# Patient Record
Sex: MALE | Race: WHITE | Employment: OTHER | URBAN - METROPOLITAN AREA
[De-identification: names, ages, dates, MRNs, and addresses within clinical notes are randomized per-mention and may not be internally consistent; named-entity substitution may affect disease eponyms.]

---

## 2020-08-21 ENCOUNTER — HOSPITAL ENCOUNTER (EMERGENCY)
Age: 85
Discharge: HOME | End: 2020-08-21
Attending: EMERGENCY MEDICINE
Payer: MEDICARE

## 2020-08-21 ENCOUNTER — APPOINTMENT (OUTPATIENT)
Dept: GENERAL RADIOLOGY | Age: 85
End: 2020-08-21
Payer: MEDICARE

## 2020-08-21 ENCOUNTER — APPOINTMENT (OUTPATIENT)
Dept: CT IMAGING | Age: 85
End: 2020-08-21
Payer: MEDICARE

## 2020-08-21 VITALS
BODY MASS INDEX: 30.05 KG/M2 | TEMPERATURE: 98.1 F | SYSTOLIC BLOOD PRESSURE: 134 MMHG | OXYGEN SATURATION: 98 % | WEIGHT: 187 LBS | HEART RATE: 78 BPM | HEIGHT: 66 IN | RESPIRATION RATE: 18 BRPM | DIASTOLIC BLOOD PRESSURE: 63 MMHG

## 2020-08-21 PROCEDURE — 73700 CT LOWER EXTREMITY W/O DYE: CPT

## 2020-08-21 PROCEDURE — 99282 EMERGENCY DEPT VISIT SF MDM: CPT

## 2020-08-21 PROCEDURE — 99283 EMERGENCY DEPT VISIT LOW MDM: CPT

## 2020-08-21 PROCEDURE — 73130 X-RAY EXAM OF HAND: CPT

## 2020-08-21 PROCEDURE — 73564 X-RAY EXAM KNEE 4 OR MORE: CPT

## 2020-08-21 RX ORDER — HYDROCODONE BITARTRATE AND ACETAMINOPHEN 5; 325 MG/1; MG/1
1 TABLET ORAL EVERY 6 HOURS PRN
Qty: 12 TABLET | Refills: 0 | Status: SHIPPED | OUTPATIENT
Start: 2020-08-21 | End: 2020-08-24

## 2020-08-21 NOTE — ED PROVIDER NOTES
ED Attending  CC: Nataly      Department of Emergency Medicine   ED  Provider Note  Admit Date/RoomTime: 8/21/2020 11:23 AM  ED Room: 29/29  Chief Complaint   Fall (Glean Cea fall with right knee injury- no head injury or loc) and Knee Injury    History of Present Illness   Source of history provided by:  patient. History/Exam Limitations: none. Vernell Howell is a 80 y.o. old male who has a past medical history of:   Past Medical History:   Diagnosis Date    Cancer 1996    PROSTATE    Hyperlipidemia     Urethral stricture     presents to the emergency department by private vehicle, for pain located pateller to right knee  which occured last night  prior to arrival.  The complaint occurred as a result of injury Due to \"missing the last step and landing on my knee and catching myself with my left hand injuring my thumb. \"  while at home. There has been a history of no prior problems with this area in the past.  Since onset the symptoms have been mild in degree. His pain is aggraveated by movement and use and relieved by nothing. Tetanus Status: more than 10 years ago. His weight bearing ability:  normal.  Patient is able to still bear weight with assistance. Patient denies hitting his head or having any loss of consciousness. Patient states he took some aspirin this morning but he came in to get evaluation. Patient is not any blood thinners. ROS    Pertinent positives and negatives are stated within HPI, all other systems reviewed and are negative. Past Surgical History:   Procedure Laterality Date    CYSTOSCOPY  1996    SEED IMPLANTATION    CYSTOSCOPY  07/10/2012    retrograde pyelograms urethral dilatation   Social History:  reports that he has never smoked. He does not have any smokeless tobacco history on file. He reports that he does not drink alcohol or use drugs. Family History: family history is not on file. Allergies: Patient has no known allergies.     Physical Exam          ED Triage Vitals [08/21/20 1121]   BP Temp Temp Source Pulse Resp SpO2 Height Weight   134/63 98.1 °F (36.7 °C) Temporal 78 18 98 % 5' 6\" (1.676 m) 187 lb (84.8 kg)       Oxygen Saturation Interpretation: Normal.    Constitutional:  Alert, development consistent with age. Neck:  Normal ROM. Supple. Knee:  Right patellar:             Tenderness:  Mild pain noted over patella into tibial region            Swelling/Effusion: Mild. Deformity: no.              ROM: full range with pain. Skin:  no erythema, rash or wounds noted. Drawer's:  Negative. Lachman's: Negative. Apley's: Negative. Rosario's: Negative. Valgus/Varus Stress: Negative. Crepitus: Negative. Hand: Left thumb            Tenderness: Mild noted at the proximal portion of the thumb            Swelling: mild             Deformity: no.              ROM: full range of motion with pain              Skin:  no erythema, rash or wounds noted. Hip:            Tenderness:  none. Swelling: None. Deformity: no.              ROM: full range of motion. Skin:  no erythema, rash or wounds noted. Joint(s) Below: ankle. Tenderness:  none. Swelling: No.              Deformity: no.             ROM: full range of motion. Skin:  no erythema, rash or wounds noted. Neurovascular: Motor deficit: none. Sensory deficit: none. Pulse deficit: none. Capillary refill: normal.  Gait:  normal.  Lymphatics: No lymphangitis or adenopathy noted. Neurological:  Oriented. Motor functions intact. Lab / Imaging Results   (All laboratory and radiology results have been personally reviewed by myself)  Labs:  No results found for this visit on 08/21/20. Imaging: All Radiology results interpreted by Radiologist unless otherwise noted.   CT KNEE RIGHT WO CONTRAST   Final Result   There is a dorsal margin fracture of the mid tibia, not involving the   tibial articular contours, as well as hemarthrosis. There is a popliteal cyst containing a fluid fluid level, confirming a   hemarthrosis. Age age-related osteopenia and degenerative osteoarthropathy are   noted. ALERT:  THIS IS AN ABNORMAL REPORT-posterior mid line tibial fracture   with hemarthrosis and popliteal cyst      XR KNEE RIGHT (MIN 4 VIEWS)   Final Result      1. No acute fracture. 2. Joint effusion and anterior superior knee swelling. 3. Medial knee degeneration. XR HAND LEFT (MIN 3 VIEWS)   Final Result      1. No fracture or joint dislocation. 2. Degenerative changes, as described. ED Course / Medical Decision Making   Medications - No data to display  ED Course as of Aug 21 1346   Fri Aug 21, 2020   1336 Spoke to Dr. Myriam Gaona in regards to the tibial fracture. He states that he reviewed the imaging and the patient can be placed in a full knee immobilizer and given a wheelchair. Orthopedic states that he can bear weight with knee immobilizer when up and around. Patient was told he must follow-up with Dr. Myriam Gaona next week to be placed for a specific brace    [AM]      ED Course User Index  [AM] Jessica Brantley PA-C     Consult(s):   IP CONSULT TO ORTHOPEDIC SURGERY    Procedure(s):   none. Medical Decision Making:    Films were obtained based on positive suspicion for bony injury as per history/physical findings . Patient had a x-ray of the knee which was concerning for swelling in some areas around his tibia therefore CT of the right knee was ordered. Patient was noted to have a posterior midline tibial fracture. I did speak to orthopedics who reviewed the imaging. They state that the patient to be placed in a knee immobilizer and he can bear weight on the area. He was told if he is sitting down he can take the knee brace off and use ice 20 minutes on 20 minutes off.   Patient will be given a small dose of narcotics. Patient was told he must follow-up with Dr. Marco Munoz next week to be fitted for a special knee brace. Patient is neurovascular and sensory intact, voiced understanding and agree with the plan of management. Patient will be written for a walker if needed as well. Patient was told to use Tylenol for the discomfort and only use the pain medication when needed. Patient voiced understanding agreing with the plan of management plan is subsequently for symptom control, limited use and  immobilization with appropriate outpatient follow-up. Patient was explicitly instructed on specific signs and symptoms on which to return to the emergency room for. Patient was instructed to return to the ER for any new or worsening symptoms. Additional discharge instructions were given verbally. All questions were answered. Patient is comfortable and agreeable with discharge plan. Patient in no acute distress and non-toxic in appearance. Counseling: The emergency provider has spoken with the patient and discussed todays results, in addition to providing specific details for the plan of care and counseling regarding the diagnosis and prognosis. Questions are answered at this time and they are agreeable with the plan. Assessment      1. Closed fracture of proximal end of right tibia, unspecified fracture morphology, initial encounter      Plan   Discharge to home  Patient condition is stable    New Medications     New Prescriptions    HYDROCODONE-ACETAMINOPHEN (NORCO) 5-325 MG PER TABLET    Take 1 tablet by mouth every 6 hours as needed for Pain for up to 3 days. Intended supply: 3 days. Take lowest dose possible to manage pain    MISC.  DEVICES (WALKER WHEELS) MISC    1 each by Does not apply route once for 1 dose Dx: S82.101A, dispense walker for patient due to right tibial fx     Electronically signed by Tavo Daniel PA-C   DD: 8/21/20  **This report was transcribed using voice recognition software. Every effort was made to ensure accuracy; however, inadvertent computerized transcription errors may be present.   END OF ED PROVIDER NOTE        Marlon Piper PA-C  08/21/20 [address] [place] [address], [place]AN[place]  08/21/20 4417

## 2021-04-17 ENCOUNTER — APPOINTMENT (OUTPATIENT)
Dept: CT IMAGING | Age: 86
DRG: 310 | End: 2021-04-17
Payer: MEDICARE

## 2021-04-17 ENCOUNTER — HOSPITAL ENCOUNTER (INPATIENT)
Age: 86
LOS: 2 days | Discharge: HOME | DRG: 310 | End: 2021-04-19
Attending: EMERGENCY MEDICINE | Admitting: STUDENT IN AN ORGANIZED HEALTH CARE EDUCATION/TRAINING PROGRAM
Payer: MEDICARE

## 2021-04-17 ENCOUNTER — APPOINTMENT (OUTPATIENT)
Dept: GENERAL RADIOLOGY | Age: 86
DRG: 310 | End: 2021-04-17
Payer: MEDICARE

## 2021-04-17 DIAGNOSIS — R55 SYNCOPE AND COLLAPSE: ICD-10-CM

## 2021-04-17 DIAGNOSIS — I44.1 HEART BLOCK AV SECOND DEGREE: ICD-10-CM

## 2021-04-17 DIAGNOSIS — S09.90XA INJURY OF HEAD, INITIAL ENCOUNTER: Primary | ICD-10-CM

## 2021-04-17 PROBLEM — E78.49 OTHER HYPERLIPIDEMIA: Status: ACTIVE | Noted: 2021-04-17

## 2021-04-17 LAB
ALBUMIN SERPL-MCNC: 3.7 G/DL (ref 3.5–5.2)
ALP BLD-CCNC: 86 U/L (ref 40–129)
ALT SERPL-CCNC: 12 U/L (ref 0–40)
ANION GAP SERPL CALCULATED.3IONS-SCNC: 12 MMOL/L (ref 7–16)
APTT: 26.9 SEC (ref 24.5–35.1)
AST SERPL-CCNC: 16 U/L (ref 0–39)
BASOPHILS ABSOLUTE: 0.03 E9/L (ref 0–0.2)
BASOPHILS RELATIVE PERCENT: 0.6 % (ref 0–2)
BILIRUB SERPL-MCNC: 1.3 MG/DL (ref 0–1.2)
BUN BLDV-MCNC: 18 MG/DL (ref 8–23)
CALCIUM SERPL-MCNC: 9.3 MG/DL (ref 8.6–10.2)
CHLORIDE BLD-SCNC: 108 MMOL/L (ref 98–107)
CO2: 22 MMOL/L (ref 22–29)
CREAT SERPL-MCNC: 1 MG/DL (ref 0.7–1.2)
EOSINOPHILS ABSOLUTE: 0.1 E9/L (ref 0.05–0.5)
EOSINOPHILS RELATIVE PERCENT: 2.1 % (ref 0–6)
GFR AFRICAN AMERICAN: >60
GFR NON-AFRICAN AMERICAN: >60 ML/MIN/1.73
GLUCOSE BLD-MCNC: 115 MG/DL (ref 74–99)
HCT VFR BLD CALC: 43.4 % (ref 37–54)
HEMOGLOBIN: 14.6 G/DL (ref 12.5–16.5)
IMMATURE GRANULOCYTES #: 0.01 E9/L
IMMATURE GRANULOCYTES %: 0.2 % (ref 0–5)
INR BLD: 1.1
LYMPHOCYTES ABSOLUTE: 1.29 E9/L (ref 1.5–4)
LYMPHOCYTES RELATIVE PERCENT: 26.6 % (ref 20–42)
MAGNESIUM: 2 MG/DL (ref 1.6–2.6)
MCH RBC QN AUTO: 31.2 PG (ref 26–35)
MCHC RBC AUTO-ENTMCNC: 33.6 % (ref 32–34.5)
MCV RBC AUTO: 92.7 FL (ref 80–99.9)
MONOCYTES ABSOLUTE: 0.39 E9/L (ref 0.1–0.95)
MONOCYTES RELATIVE PERCENT: 8 % (ref 2–12)
NEUTROPHILS ABSOLUTE: 3.03 E9/L (ref 1.8–7.3)
NEUTROPHILS RELATIVE PERCENT: 62.5 % (ref 43–80)
PDW BLD-RTO: 12.9 FL (ref 11.5–15)
PLATELET # BLD: 272 E9/L (ref 130–450)
PMV BLD AUTO: 9.3 FL (ref 7–12)
POTASSIUM SERPL-SCNC: 4.1 MMOL/L (ref 3.5–5)
PRO-BNP: 171 PG/ML (ref 0–450)
PROTHROMBIN TIME: 11.9 SEC (ref 9.3–12.4)
RBC # BLD: 4.68 E12/L (ref 3.8–5.8)
SODIUM BLD-SCNC: 142 MMOL/L (ref 132–146)
TOTAL PROTEIN: 6.8 G/DL (ref 6.4–8.3)
TROPONIN: <0.01 NG/ML (ref 0–0.03)
TROPONIN: <0.01 NG/ML (ref 0–0.03)
WBC # BLD: 4.9 E9/L (ref 4.5–11.5)

## 2021-04-17 PROCEDURE — 70450 CT HEAD/BRAIN W/O DYE: CPT

## 2021-04-17 PROCEDURE — 80053 COMPREHEN METABOLIC PANEL: CPT

## 2021-04-17 PROCEDURE — 99285 EMERGENCY DEPT VISIT HI MDM: CPT

## 2021-04-17 PROCEDURE — 6360000002 HC RX W HCPCS: Performed by: STUDENT IN AN ORGANIZED HEALTH CARE EDUCATION/TRAINING PROGRAM

## 2021-04-17 PROCEDURE — 71045 X-RAY EXAM CHEST 1 VIEW: CPT

## 2021-04-17 PROCEDURE — 84484 ASSAY OF TROPONIN QUANT: CPT

## 2021-04-17 PROCEDURE — 93005 ELECTROCARDIOGRAM TRACING: CPT | Performed by: STUDENT IN AN ORGANIZED HEALTH CARE EDUCATION/TRAINING PROGRAM

## 2021-04-17 PROCEDURE — 85610 PROTHROMBIN TIME: CPT

## 2021-04-17 PROCEDURE — 85025 COMPLETE CBC W/AUTO DIFF WBC: CPT

## 2021-04-17 PROCEDURE — 36415 COLL VENOUS BLD VENIPUNCTURE: CPT

## 2021-04-17 PROCEDURE — 85730 THROMBOPLASTIN TIME PARTIAL: CPT

## 2021-04-17 PROCEDURE — 90715 TDAP VACCINE 7 YRS/> IM: CPT | Performed by: NURSE PRACTITIONER

## 2021-04-17 PROCEDURE — 6370000000 HC RX 637 (ALT 250 FOR IP): Performed by: STUDENT IN AN ORGANIZED HEALTH CARE EDUCATION/TRAINING PROGRAM

## 2021-04-17 PROCEDURE — 72125 CT NECK SPINE W/O DYE: CPT

## 2021-04-17 PROCEDURE — 93005 ELECTROCARDIOGRAM TRACING: CPT | Performed by: NURSE PRACTITIONER

## 2021-04-17 PROCEDURE — 90471 IMMUNIZATION ADMIN: CPT | Performed by: NURSE PRACTITIONER

## 2021-04-17 PROCEDURE — 6360000002 HC RX W HCPCS: Performed by: NURSE PRACTITIONER

## 2021-04-17 PROCEDURE — 2060000000 HC ICU INTERMEDIATE R&B

## 2021-04-17 PROCEDURE — 83735 ASSAY OF MAGNESIUM: CPT

## 2021-04-17 PROCEDURE — 83880 ASSAY OF NATRIURETIC PEPTIDE: CPT

## 2021-04-17 PROCEDURE — 2580000003 HC RX 258: Performed by: STUDENT IN AN ORGANIZED HEALTH CARE EDUCATION/TRAINING PROGRAM

## 2021-04-17 RX ORDER — ACETAMINOPHEN 650 MG/1
650 SUPPOSITORY RECTAL EVERY 6 HOURS PRN
Status: DISCONTINUED | OUTPATIENT
Start: 2021-04-17 | End: 2021-04-19 | Stop reason: HOSPADM

## 2021-04-17 RX ORDER — ATORVASTATIN CALCIUM 40 MG/1
40 TABLET, FILM COATED ORAL NIGHTLY
Status: DISCONTINUED | OUTPATIENT
Start: 2021-04-17 | End: 2021-04-19 | Stop reason: HOSPADM

## 2021-04-17 RX ORDER — SODIUM CHLORIDE 0.9 % (FLUSH) 0.9 %
5-40 SYRINGE (ML) INJECTION PRN
Status: DISCONTINUED | OUTPATIENT
Start: 2021-04-17 | End: 2021-04-19 | Stop reason: HOSPADM

## 2021-04-17 RX ORDER — SODIUM CHLORIDE 9 MG/ML
25 INJECTION, SOLUTION INTRAVENOUS PRN
Status: DISCONTINUED | OUTPATIENT
Start: 2021-04-17 | End: 2021-04-19 | Stop reason: HOSPADM

## 2021-04-17 RX ORDER — SODIUM CHLORIDE 0.9 % (FLUSH) 0.9 %
5-40 SYRINGE (ML) INJECTION EVERY 12 HOURS SCHEDULED
Status: DISCONTINUED | OUTPATIENT
Start: 2021-04-17 | End: 2021-04-19 | Stop reason: HOSPADM

## 2021-04-17 RX ORDER — POLYETHYLENE GLYCOL 3350 17 G/17G
17 POWDER, FOR SOLUTION ORAL DAILY PRN
Status: DISCONTINUED | OUTPATIENT
Start: 2021-04-17 | End: 2021-04-19 | Stop reason: HOSPADM

## 2021-04-17 RX ORDER — ASPIRIN 81 MG/1
81 TABLET ORAL DAILY
Status: DISCONTINUED | OUTPATIENT
Start: 2021-04-17 | End: 2021-04-19 | Stop reason: HOSPADM

## 2021-04-17 RX ORDER — PROMETHAZINE HYDROCHLORIDE 25 MG/1
12.5 TABLET ORAL EVERY 6 HOURS PRN
Status: DISCONTINUED | OUTPATIENT
Start: 2021-04-17 | End: 2021-04-19 | Stop reason: HOSPADM

## 2021-04-17 RX ORDER — ONDANSETRON 2 MG/ML
4 INJECTION INTRAMUSCULAR; INTRAVENOUS EVERY 6 HOURS PRN
Status: DISCONTINUED | OUTPATIENT
Start: 2021-04-17 | End: 2021-04-19 | Stop reason: HOSPADM

## 2021-04-17 RX ORDER — ACETAMINOPHEN 325 MG/1
650 TABLET ORAL EVERY 6 HOURS PRN
Status: DISCONTINUED | OUTPATIENT
Start: 2021-04-17 | End: 2021-04-19 | Stop reason: HOSPADM

## 2021-04-17 RX ORDER — SODIUM CHLORIDE 9 MG/ML
INJECTION, SOLUTION INTRAVENOUS CONTINUOUS
Status: DISCONTINUED | OUTPATIENT
Start: 2021-04-17 | End: 2021-04-18

## 2021-04-17 RX ADMIN — TETANUS TOXOID, REDUCED DIPHTHERIA TOXOID AND ACELLULAR PERTUSSIS VACCINE, ADSORBED 0.5 ML: 5; 2.5; 8; 8; 2.5 SUSPENSION INTRAMUSCULAR at 13:58

## 2021-04-17 RX ADMIN — Medication 10 ML: at 22:01

## 2021-04-17 RX ADMIN — ENOXAPARIN SODIUM 40 MG: 40 INJECTION SUBCUTANEOUS at 18:43

## 2021-04-17 RX ADMIN — SODIUM CHLORIDE: 9 INJECTION, SOLUTION INTRAVENOUS at 18:40

## 2021-04-17 RX ADMIN — ASPIRIN 81 MG: 81 TABLET, COATED ORAL at 18:40

## 2021-04-17 ASSESSMENT — PAIN SCALES - GENERAL: PAINLEVEL_OUTOF10: 0

## 2021-04-17 NOTE — ACP (ADVANCE CARE PLANNING)
ADVANCED CARE PLANNING    Patient Name: Sukhwinder Lamas       YOB: 1932              MRN:    [id number]  Admission Date:  4/17/2021  1:21 PM      Active Diagnoses:  Principal Problem:    Syncope and collapse  Active Problems:    AV block, Mobitz II    Other hyperlipidemia  Resolved Problems:    * No resolved hospital problems. *      These active diagnoses are of sufficient risk that focused discussion on advanced care planning is indicated in order to allow the patient to thoughtfully consider personal goals of care; and, if situations arise that prevent the patient to personally give input, to ensure appropriate representation of their personal desire for different levels and levels of care. Person(s) present in discussion: David Strong MD    Discussion: I reviewed his admission for the above diagnoses and his desires for ongoing aggresive care, including potential intubation and mechanical ventilation; also discussed who would speak on his behalf should he be unable to do so and discussed what conversations he has had with his family so they understand his desires if such a situation occurred now or in the future. PLAN:  Code Status:  At this time patient wishes to be FULL CODE    Time Spent on Advanced Planning Documents: 18 minutes    Carol Vega MD  [address]

## 2021-04-17 NOTE — ED PROVIDER NOTES
ED Attending  CC: No     HPI:  4/17/21, Time: 1:53 PM EDT         David Ford is a 80 y.o. male presenting to the ED for syncope and head injury, beginning pta ago. The complaint has been constant, mild in severity, and worsened by nothing. Patient presents for complaints of a syncopal episode. States that he was walking towards his garage when he had in episode where he \"blacked out\" and then woke up on the concrete. He does have a contusion and abrasion to the right frontal area. He denies any chest pain or shortness of breath. He denies any past medical history other than hyperlipidemia. He is on no anticoagulation. He is in a cervical collar in place. He denies any physical complaints of pain. ROS:   Pertinent positives and negatives are stated within HPI, all other systems reviewed and are negative.  --------------------------------------------- PAST HISTORY ---------------------------------------------  Past Medical History:  has a [facility] Hyperlipidemia, and Urethral stricture. Past Surgical History:  has a past surgical history that includes Cystocopy (1996) and Cystocopy (07/10/2012). Social History:  reports that he has never smoked. He has never used smokeless tobacco. He reports that he does not drink alcohol or use drugs. Family History: family history is not on file. The patients home medications have been reviewed. Allergies: Patient has no known allergies. ---------------------------------------------------PHYSICAL EXAM--------------------------------------    Constitutional/General: Alert and oriented x3, well appearing, non toxic in NAD  Head: Normocephalic and atraumatic, contusion and abrasion noted to the right frontal area. Eyes: PERRL, EOMI, 3 mm and briskly reactive no nystagmus.   Mouth: Oropharynx clear, handling secretions, no trismus  Neck: Supple, , non tender to palpation in the midline, no stridor, no crepitus, no meningeal signs, no midline tenderness of the cervical spine cervical collar is in place range of motion deferred at this time. Pulmonary: Lungs clear to auscultation bilaterally, no wheezes, rales, or rhonchi. Not in respiratory distress  Cardiovascular:  Regular rate. Regular rhythm. No murmurs, gallops, or rubs. 2+ distal pulses  Chest: no chest wall tenderness  Abdomen: Soft. Non tender. Non distended. +BS. No rebound, guarding, or rigidity. No pulsatile masses appreciated. Musculoskeletal: Moves all extremities x 4. Warm and well perfused, no clubbing, cyanosis, or edema. Capillary refill <3 seconds, no tenderness on palpation to the midline of the cervical, thoracic or lumbar spine. Skin: warm and dry. No rashes. Neurologic: GCS 15, CN 2-12 grossly intact, no focal deficits, symmetric strength 5/5 in the upper and lower extremities bilaterally  Psych: Normal Affect    -------------------------------------------------- RESULTS -------------------------------------------------  I have personally reviewed all laboratory and imaging results for this patient. Results are listed below.      LABS:  Results for orders placed or performed during the hospital encounter of 04/17/21   Troponin   Result Value Ref Range    Troponin <0.01 0.00 - 0.03 ng/mL   CBC Auto Differential   Result Value Ref Range    WBC 4.9 4.5 - 11.5 E9/L    RBC 4.68 3.80 - 5.80 E12/L    Hemoglobin 14.6 12.5 - 16.5 g/dL    Hematocrit 43.4 37.0 - 54.0 %    MCV 92.7 80.0 - 99.9 fL    MCH 31.2 26.0 - 35.0 pg    MCHC 33.6 32.0 - 34.5 %    RDW 12.9 11.5 - 15.0 fL    Platelets 020 579 - 705 E9/L    MPV 9.3 7.0 - 12.0 fL    Neutrophils % 62.5 43.0 - 80.0 %    Immature Granulocytes % 0.2 0.0 - 5.0 %    Lymphocytes % 26.6 20.0 - 42.0 %    Monocytes % 8.0 2.0 - 12.0 %    Eosinophils % 2.1 0.0 - 6.0 %    Basophils % 0.6 0.0 - 2.0 %    Neutrophils Absolute 3.03 1.80 - 7.30 E9/L    Immature Granulocytes # 0.01 E9/L    Lymphocytes Absolute 1.29 (L) 1.50 - recommended. 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 /68   Pulse 51   Temp 97.7 °F (36.5 °C)   Resp 17   Ht 5' 6\" (1.676 m)   Wt 185 lb (83.9 kg)   SpO2 99%   BMI 29.86 kg/m²   Oxygen Saturation Interpretation: Normal    The patients available past medical records and past encounters were reviewed. ------------------------------ ED COURSE/MEDICAL DECISION MAKING----------------------  Medications   Tetanus-Diphth-Acell Pertussis (BOOSTRIX) injection 0.5 mL (0.5 mLs Intramuscular Given 21 1358)             Medical Decision Makin-patient was examined by Dr. Carla San we will continue to monitor and reevaluate. Time:   Re-evaluation. Patients symptoms show no change  Repeat physical examination is not changed, patient resting comfortably at this time physical exam is unchanged. He and his wife both made aware of the recommendation for admission due to the abnormal EKG and the syncopal episode. They both do agree. He denies any complaints of pain. His cervical collar was removed he does have full range of motion of the cervical spine with no midline tenderness. 155-call to Dr. Jade Jacobs update on patient's clinical presentation physical exam and abnormal findings regarding the second-degree heart block on his twelve-lead EKG but with the syncopal episode will admit the patient under his service to a telemetry bed with a diagnosis of syncope. Re-Evaluations:             Re-evaluation. Patients symptoms are improving      Consultations:                 Critical Care: This patient's ED course included: a personal history and physicial examination, re-evaluation prior to disposition, multiple bedside re-evaluations and a personal history and physicial eaxmination    This patient has remained hemodynamically stable and improved during their ED course. Counseling: The emergency provider has spoken with the patient and discussed todays results, in addition to providing specific details for the plan of care and counseling regarding the diagnosis and prognosis. Questions are answered at this time and they are agreeable with the plan.       --------------------------------- IMPRESSION AND DISPOSITION ---------------------------------    IMPRESSION  1. Injury of head, initial encounter    2. Syncope and collapse    3. Heart block AV second degree        DISPOSITION  Disposition: Admit to telemetry  Patient condition is stable        NOTE: This report was transcribed using voice recognition software.  Every effort was made to ensure accuracy; however, inadvertent computerized transcription errors may be present         MAVIS Peoples - CNP  04/17/21 1553

## 2021-04-17 NOTE — H&P
Hospitalist History & Physical      PCP: Yvonne Clark MD    Date of Admission: 4/17/2021    Date of Service: Pt seen/examined on 4/17/2021 and is admitted to Inpatient with expected LOS greater than two midnights due to medical therapy. Chief Complaint:  had concerns including Fall (Fall at home. -Thinners. +LOC and N/V. ). History Of Present Illness:    Mr. Nael Roe, a 80y.o. year old male  who  has a past medical history of Cancer ([address] Hyperlipidemia, and Urethral stricture. Pt was BIBEMS s/p syncope with fall today. Pt was accompanied by his wife, whom was at bedside. States he was in the kitchen helping his wife in the kitchen, pt went to grab a cup and subsequently lost consciousness sustaining a fall with subsequent trauma to his right forehead. He remembers events leading up to syncope and denies any disorientation upon awakening. He does endorse nausea while in the ambulance which was associated with 2 episodes of NBNB emesis. Pts last meal was breakfast and he endorsed staying hydrated throughout the day. Pt denies any preceding events, including but not limited to; headaches, orthopnea, chest pain, palpitations, SOB, abd pain, nausea, vomiting, diarrhea, fevers, chills, numbness or tingling in his extremities. Pt has not PMH of seizure d/o and states this has never happened before. He had no tongue biting or loss of bowel or bladder function. Pt denies any alcohol, tobacco or illicit drug use. Pt states he only takes Lipitor every other day and has been compliant with his medications. In ED, pt was afebrile and hemodynamically stable. CBC, CMP, coag profile were WNL. Troponin negative x 1. Neuroimaging were negative for acute intracranial abnormalities. CXR was negative for acute process. EKg showed a suspected second degree, Mobitz type 2 AV block, new compared to prior. Pt to be admitted for further evaluation.     Past Medical History:        Diagnosis Date    Cancer [facility] 1996    PROSTATE    Hyperlipidemia     Urethral stricture        Past Surgical History:        Procedure Laterality Date    CYSTOSCOPY  1996    SEED IMPLANTATION    CYSTOSCOPY  07/10/2012    retrograde pyelograms urethral dilatation       Medications Prior to Admission:      Prior to Admission medications    Medication Sig Start Date End Date Taking? Authorizing Provider   atorvastatin (LIPITOR) 10 MG tablet Take 10 mg by mouth every other day. NIGHTLY    Yes Historical Provider, MD       Allergies:  Patient has no known allergies. Social History:    TOBACCO:   reports that he has never smoked. He has never used smokeless tobacco.  ETOH:   reports no history of alcohol use. Family History:    Reviewed in detail and negative for DM, CAD, Cancer, CVA. Positive as follows\"  History reviewed. No pertinent family history. REVIEW OF SYSTEMS:   Pertinent positives as noted in the HPI. All other systems reviewed and negative. PHYSICAL EXAM:  /67   Pulse 53   Temp 97.6 °F (36.4 °C)   Resp 17   Ht 5' 6\" (1.676 m)   Wt 185 lb (83.9 kg)   SpO2 99%   BMI 29.86 kg/m²   General appearance: No apparent distress, appears stated age and cooperative. HEENT: Normal cephalic, atraumatic without obvious deformity. Pupils equal, round, and reactive to light. Extra ocular muscles intact. Conjunctivae/corneas clear. Neck: Supple, with full range of motion. No jugular venous distention. Trachea midline. Respiratory: CTA B/L. No wheezes, rhonchi or rales  Cardiovascular: N S1/S2. No murmurs, rubs or gallops  Abdomen: Soft NTTP. + BS  Musculoskeletal: No clubbing, cyanosis, trace edema of bilateral lower extremities. Brisk capillary refill. Skin: Normal skin color. No rashes or lesions. Neurologic:  Neurovascularly intact without any focal sensory/motor deficits.  Cranial nerves: II-XII intact, grossly non-focal.  Psychiatric: Alert and oriented, thought content appropriate, normal insight    Reviewed EKG and CXR personally    CBC:   Recent Labs     04/17/21  1354   WBC 4.9   RBC 4.68   HGB 14.6   HCT 43.4   MCV 92.7   RDW 12.9        BMP:   Recent Labs     04/17/21  1354      K 4.1   *   CO2 22   BUN 18   CREATININE 1.0   MG 2.0     LFT:  Recent Labs     04/17/21  1354   PROT 6.8   ALKPHOS 86   ALT 12   AST 16   BILITOT 1.3*     CE:  Recent Labs     04/17/21  1354   TROPONINI <0.01     PT/INR:   Recent Labs     04/17/21  1354   INR 1.1   APTT 26.9     BNP: No results for input(s): BNP in the last 72 hours. ESR: No results found for: SEDRATE  CRP: No results found for: CRP  D Dimer: No results found for: DDIMER   Folate and B12: No results found for: [id number], No results found for: FOLATE  Lactic Acid: No results found for: LACTA  Thyroid Studies: No results found for: TSH, H3UIZZW, V7OLYKI, THYROIDAB    Oupatient labs:  Lab Results   Component Value Date    INR 1.1 04/17/2021       Urinalysis:  No results found for: NITRU, WBCUA, BACTERIA, RBCUA, BLOODU, SPECGRAV, GLUCOSEU    Imaging:  Ct Head Wo Contrast    Result Date: 4/17/2021  EXAMINATION: CT OF THE HEAD WITHOUT CONTRAST  4/17/2021 2:50 pm TECHNIQUE: CT of the head was performed without the administration of intravenous contrast. Dose modulation, iterative reconstruction, and/or weight based adjustment of the mA/kV was utilized to reduce the radiation dose to as low as reasonably achievable. COMPARISON: None. HISTORY: ORDERING SYSTEM PROVIDED HISTORY: Trauma, fall TECHNOLOGIST PROVIDED HISTORY: Has a \"code stroke\" or \"stroke alert\" been called? ->No Reason for exam:->Trauma, fall Decision Support Exception->Emergency Medical Condition (MA) What reading provider will be dictating this exam?->CRC FINDINGS: BRAIN/VENTRICLES: There is no acute intracranial hemorrhage, mass effect or midline shift. No abnormal extra-axial fluid collection. The gray-white differentiation is maintained without evidence of an acute infarct.   There is no evidence of hydrocephalus. ORBITS: The visualized portion of the orbits demonstrate no acute abnormality. SINUSES: The visualized paranasal sinuses and mastoid air cells demonstrate no acute abnormality. SOFT TISSUES/SKULL:  No acute abnormality of the visualized skull or soft tissues. No acute intracranial abnormality. There is age-appropriate atrophy and small-vessel ischemic disease. Ct Cervical Spine Wo Contrast    Result Date: 4/17/2021  EXAMINATION: CT OF THE CERVICAL SPINE WITHOUT CONTRAST 4/17/2021 2:50 pm TECHNIQUE: CT of the cervical spine was performed without the administration of intravenous contrast. Multiplanar reformatted images are provided for review. Dose modulation, iterative reconstruction, and/or weight based adjustment of the mA/kV was utilized to reduce the radiation dose to as low as reasonably achievable. COMPARISON: None. HISTORY: ORDERING SYSTEM PROVIDED HISTORY: fall, pian TECHNOLOGIST PROVIDED HISTORY: Reason for exam:->fall, pian Decision Support Exception->Emergency Medical Condition (MA) What reading provider will be dictating this exam?->CRC FINDINGS: BONES/ALIGNMENT: There is no acute fracture or traumatic malalignment. DEGENERATIVE CHANGES: Multilevel degenerative disc disease of cervical spine most prominent seen at C3-C4, C5-C6, C6-C7. Uncovertebral arthropathy the osseous seen at these levels. There are multilevel facet arthropathy seen from C2-C7. SOFT TISSUES: There is no prevertebral soft tissue swelling. No traumatic injuries of cervical spine. Multilevel degenerative disc disease as described above. Xr Chest Portable    Result Date: 4/17/2021  EXAMINATION: ONE XRAY VIEW OF THE CHEST 4/17/2021 2:29 pm COMPARISON: None. HISTORY: ORDERING SYSTEM PROVIDED HISTORY: sob, fall TECHNOLOGIST PROVIDED HISTORY: Reason for exam:->sob, fall What reading provider will be dictating this exam?->CRC FINDINGS: Slight elevation of the right hemidiaphragm.   No pleural OH  +++++++++++++++++++++++++++++++++++++++++++++++++  NOTE: This report was transcribed using voice recognition software. Every effort was made to ensure accuracy; however, inadvertent computerized transcription errors may be present.

## 2021-04-18 LAB
ALBUMIN SERPL-MCNC: 3.5 G/DL (ref 3.5–5.2)
ALP BLD-CCNC: 78 U/L (ref 40–129)
ALT SERPL-CCNC: 11 U/L (ref 0–40)
ANION GAP SERPL CALCULATED.3IONS-SCNC: 11 MMOL/L (ref 7–16)
APTT: 31.3 SEC (ref 24.5–35.1)
AST SERPL-CCNC: 15 U/L (ref 0–39)
BASOPHILS ABSOLUTE: 0.02 E9/L (ref 0–0.2)
BASOPHILS RELATIVE PERCENT: 0.3 % (ref 0–2)
BILIRUB SERPL-MCNC: 1.1 MG/DL (ref 0–1.2)
BILIRUBIN URINE: NEGATIVE
BLOOD, URINE: NEGATIVE
BUN BLDV-MCNC: 18 MG/DL (ref 8–23)
CALCIUM SERPL-MCNC: 9 MG/DL (ref 8.6–10.2)
CHLORIDE BLD-SCNC: 109 MMOL/L (ref 98–107)
CLARITY: CLEAR
CO2: 23 MMOL/L (ref 22–29)
COLOR: YELLOW
CREAT SERPL-MCNC: 0.9 MG/DL (ref 0.7–1.2)
EKG ATRIAL RATE: 60 BPM
EKG ATRIAL RATE: 72 BPM
EKG P AXIS: 39 DEGREES
EKG P-R INTERVAL: 176 MS
EKG Q-T INTERVAL: 446 MS
EKG Q-T INTERVAL: 450 MS
EKG QRS DURATION: 130 MS
EKG QRS DURATION: 134 MS
EKG QTC CALCULATION (BAZETT): 446 MS
EKG QTC CALCULATION (BAZETT): 456 MS
EKG R AXIS: -22 DEGREES
EKG R AXIS: 17 DEGREES
EKG T AXIS: -2 DEGREES
EKG T AXIS: 22 DEGREES
EKG VENTRICULAR RATE: 60 BPM
EKG VENTRICULAR RATE: 62 BPM
EOSINOPHILS ABSOLUTE: 0.12 E9/L (ref 0.05–0.5)
EOSINOPHILS RELATIVE PERCENT: 2 % (ref 0–6)
GFR AFRICAN AMERICAN: >60
GFR NON-AFRICAN AMERICAN: >60 ML/MIN/1.73
GLUCOSE BLD-MCNC: 94 MG/DL (ref 74–99)
GLUCOSE URINE: NEGATIVE MG/DL
HCT VFR BLD CALC: 40.7 % (ref 37–54)
HEMOGLOBIN: 13.5 G/DL (ref 12.5–16.5)
IMMATURE GRANULOCYTES #: 0.01 E9/L
IMMATURE GRANULOCYTES %: 0.2 % (ref 0–5)
INR BLD: 1.2
KETONES, URINE: NEGATIVE MG/DL
LEUKOCYTE ESTERASE, URINE: NEGATIVE
LYMPHOCYTES ABSOLUTE: 1.3 E9/L (ref 1.5–4)
LYMPHOCYTES RELATIVE PERCENT: 22.2 % (ref 20–42)
MCH RBC QN AUTO: 30.9 PG (ref 26–35)
MCHC RBC AUTO-ENTMCNC: 33.2 % (ref 32–34.5)
MCV RBC AUTO: 93.1 FL (ref 80–99.9)
MONOCYTES ABSOLUTE: 0.46 E9/L (ref 0.1–0.95)
MONOCYTES RELATIVE PERCENT: 7.8 % (ref 2–12)
NEUTROPHILS ABSOLUTE: 3.95 E9/L (ref 1.8–7.3)
NEUTROPHILS RELATIVE PERCENT: 67.5 % (ref 43–80)
NITRITE, URINE: NEGATIVE
PDW BLD-RTO: 12.8 FL (ref 11.5–15)
PH UA: 6.5 (ref 5–9)
PLATELET # BLD: 254 E9/L (ref 130–450)
PMV BLD AUTO: 9.7 FL (ref 7–12)
POTASSIUM REFLEX MAGNESIUM: 4 MMOL/L (ref 3.5–5)
PROTEIN UA: NEGATIVE MG/DL
PROTHROMBIN TIME: 12.7 SEC (ref 9.3–12.4)
RBC # BLD: 4.37 E12/L (ref 3.8–5.8)
SODIUM BLD-SCNC: 143 MMOL/L (ref 132–146)
SPECIFIC GRAVITY UA: 1.02 (ref 1–1.03)
TOTAL PROTEIN: 6.3 G/DL (ref 6.4–8.3)
TROPONIN: <0.01 NG/ML (ref 0–0.03)
TROPONIN: <0.01 NG/ML (ref 0–0.03)
UROBILINOGEN, URINE: 0.2 E.U./DL
WBC # BLD: 5.9 E9/L (ref 4.5–11.5)

## 2021-04-18 PROCEDURE — 99223 1ST HOSP IP/OBS HIGH 75: CPT | Performed by: INTERNAL MEDICINE

## 2021-04-18 PROCEDURE — 6370000000 HC RX 637 (ALT 250 FOR IP): Performed by: STUDENT IN AN ORGANIZED HEALTH CARE EDUCATION/TRAINING PROGRAM

## 2021-04-18 PROCEDURE — 81003 URINALYSIS AUTO W/O SCOPE: CPT

## 2021-04-18 PROCEDURE — 85025 COMPLETE CBC W/AUTO DIFF WBC: CPT

## 2021-04-18 PROCEDURE — 36415 COLL VENOUS BLD VENIPUNCTURE: CPT

## 2021-04-18 PROCEDURE — APPSS60 APP SPLIT SHARED TIME 46-60 MINUTES: Performed by: NURSE PRACTITIONER

## 2021-04-18 PROCEDURE — 6360000002 HC RX W HCPCS: Performed by: STUDENT IN AN ORGANIZED HEALTH CARE EDUCATION/TRAINING PROGRAM

## 2021-04-18 PROCEDURE — 2580000003 HC RX 258: Performed by: STUDENT IN AN ORGANIZED HEALTH CARE EDUCATION/TRAINING PROGRAM

## 2021-04-18 PROCEDURE — 80053 COMPREHEN METABOLIC PANEL: CPT

## 2021-04-18 PROCEDURE — 85730 THROMBOPLASTIN TIME PARTIAL: CPT

## 2021-04-18 PROCEDURE — 84484 ASSAY OF TROPONIN QUANT: CPT

## 2021-04-18 PROCEDURE — 2060000000 HC ICU INTERMEDIATE R&B

## 2021-04-18 PROCEDURE — 85610 PROTHROMBIN TIME: CPT

## 2021-04-18 RX ADMIN — Medication 10 ML: at 21:00

## 2021-04-18 RX ADMIN — ENOXAPARIN SODIUM 40 MG: 40 INJECTION SUBCUTANEOUS at 08:31

## 2021-04-18 RX ADMIN — ATORVASTATIN CALCIUM 40 MG: 40 TABLET, FILM COATED ORAL at 21:15

## 2021-04-18 RX ADMIN — Medication 10 ML: at 08:31

## 2021-04-18 RX ADMIN — ASPIRIN 81 MG: 81 TABLET, COATED ORAL at 08:30

## 2021-04-18 ASSESSMENT — PAIN SCALES - GENERAL
PAINLEVEL_OUTOF10: 0

## 2021-04-18 NOTE — PLAN OF CARE
Problem: Falls - Risk of:  Goal: Will remain free from falls  Description: Will remain free from falls  4/18/2021 0555 by Sasha Shelton RN  Outcome: Met This Shift  4/18/2021 0126 by Sasha Shelton RN  Outcome: Met This Shift  Goal: Absence of physical injury  Description: Absence of physical injury  4/18/2021 0555 by Sasha Shelton RN  Outcome: Met This Shift  4/18/2021 0126 by Sasha Shelton RN  Outcome: Met This Shift

## 2021-04-18 NOTE — PROGRESS NOTES
Pt is having a lot of PACs causing the monitor to incorrectly read the HR lower than it actually is. HR is running between 55 to 65.

## 2021-04-18 NOTE — CONSULTS
The above documentation has been prepared under my direction and personally reviewed by me in its entirety. I confirm that the note above accurately reflects all work, treatment, procedures, and medical decision making performed by me. The patient's history was independently obtained. The patient was independently examined. Electrocardiogram, prior and present cardiovascular assessment, and laboratory studies were reviewed. The patient is an 60-year-old white male with no association to [facility] and no known structural heart disease. He has previous health issues are limited to that of hyperlipidemia and a prior history of carcinoma of the prostate. He is normally active with functional capabilities of approximately 4-5 mMETs and no active symptoms of anginal-like chest discomfort or other ischemic equivalents, decompensated left ventricular systolic dysfunction or volume overload nor arrhythmia related manifestations. He remained in his usual state of health until the day of hospitalization when following brunch with his wife and going to place items in a trash can upon leaning over the trash can he became lightheaded with an apparent transient loss of consciousness. He denies any additional premonitory symptoms and specifically denies chest discomfort, dyspnea, diaphoresis or an additional prodrome. Upon regaining consciousness, he related no additional symptomatology and subsequently presented to the emergency room where a resting electrocardiogram reviewed at the time of evaluation demonstrated evidence of sinus rhythm with occasional supraventricular ectopy and a right bundle branch block conduction pattern and a chest x-ray again reviewed and somewhat limited by rotation demonstrating no evidence of significant cardiomegaly with mild interstitial changes and a right upper lobe nodule.   Subsequent to hospitalization, arrhythmia monitoring demonstrates evidence of sinus rhythm/sinus bradycardia with occasional supraventricular ectopy with no additional cardiac arrhythmias or conduction abnormalities identified. Cardiac biomarkers were normal with a proBNP level of 170 pg/mL and no metabolic derangements. At the time of evaluation, his medications and allergies were reviewed as well as that of his past medical history and review of systems as documented. On examination, he presently appears in no discomfort nor distress with an abrasion noted on his right frontal region. He is hemodynamically stable vital signs as documented. Jugular venous pressure is normal with no identified carotid bruits. Lung fields are clear to auscultation. Cardiac examination still for a regular rate and rhythm with no gallop rhythm or cardiac murmur. A benign abdominal examination is present no peripheral edema identified. No focal neurologic findings are present. Diagnostic Assessment and Plan: On a clinical basis, the patient presents following an apparent syncopal episode of uncertain origin without significant premonitory symptoms. At present no cardiovascular etiology has been definitively identified with ongoing arrhythmia monitoring during the course of his observation and an echocardiogram to evaluate the presence or absence of structural heart disease. Additional recommendations will follow as appropriate with additional assessment and management of additional noncardiovascular issues deferred to the primary care service. A long-term basis, appropriate lifestyle modification to achieve weight reduction will benefit diastolic cardiac performance as well as that of appropriate risk factor modification of blood pressure and serum lipids to reduce risk of future atherosclerotic development. Thank you for allowing me to participate in your patient's care. Please feel free to contact me if you have any questions or concerns. Denny Cartagena.  Breann Wilson, [address] [place] [address] Cardiology

## 2021-04-18 NOTE — CONSULTS
Inpatient Cardiology Consultation      Reason for Consult:  Arrhythmia. Syncope. Consulting Physician: Dr Marisol Awad    Requesting Physician:  Dr Kenneth Adams    Date of Consultation: 4/18/2021    HISTORY OF PRESENT ILLNESS: 81 yo  male not established with any cardiologist.  PMH: HLD, BMI 30.8, prostate carcinoma s/p seeds, [place] wears hearing aides, and lifelong non smoker  Finished eating late morning walking from dining room to kitchen went to reach for trash can and became lightheaded, \"passed out,\" hitting R forehead on counter. Denies any loss of bowel or bladder, no CP, palpitations, diaphoresis, and room did not spin. [facility]-ED 4/17/2021 BP upon arrival 125/68 HR 47-50's AR with PAC's. EKG read by ED as Mobitz type II HB (read by cardiology as SR with PAC's). Na 142, K+ 4.1, Bun/Cr 18/0.9, troponin <0.01 x 3, p-, LFT's WNL, CBC WNL, UA negative. Diagnostics with nothing acute. Repeat EKG no HB    Patient has been up in room with no lightheadedness. Current /59. Orthostatics done this am were negative (no other orthostatics done this admission). TTE ordered by ED physican    Please note: past medical records were reviewed per electronic medical record (EMR) - see detailed reports under Past Medical/ Surgical History. Past Medical History:    1. Tonsillectomy  2. Lifelong non smoker  3. HLD on Lipitor 10 QOD  4. BMI 30.8  5. [place] wears hearing aides  6. \"bad\" Knees s/p injections  7. Ureteral stricture s/p cystoscopy and urethral dilatation  8. Excision of skin carcinoma  9. Prostate carcinoma in 1986 s/p seed implant's (treated at [facility])        Medications Prior to admit:  Prior to Admission medications    Medication Sig Start Date End Date Taking? Authorizing Provider   atorvastatin (LIPITOR) 10 MG tablet Take 10 mg by mouth every other day.  NIGHTLY    Yes Historical Provider, MD       Current Medications:    Current Facility-Administered Medications: sodium chloride flush Denies temperature intolerance. No recent weight change. .  · Hematologic/Lymphatic: Denies abnormal bruising or bleeding. No swollen lymph nodes    PHYSICAL EXAM:   /60   Pulse 56   Temp 98 °F (36.7 °C) (Temporal)   Resp 19   Ht 5' 6\" (1.676 m)   Wt 191 lb 6.4 oz (86.8 kg)   SpO2 96%   BMI 30.89 kg/m²   CONST:  Well developed, elderly  male who appears of stated age. Awake, alert and cooperative. No apparent distress. HEENT:   Head- Normocephalic, atraumatic   Eyes- Conjunctivae pink, anicteric  Throat- Oral mucosa pink and moist  Neck-  Thick. No stridor, trachea midline, no jugular venous distention. No carotid bruit. CHEST: Chest symmetrical and non-tender to palpation. No accessory muscle use or intercostal retractions  RESPIRATORY: Lung sounds - clear throughout fields   CARDIOVASCULAR:     Heart Ausculation- Regular rate and rhythm, no murmur. No s3, s4 or rub   PV: trace bilateral ankle edema. No varicosities. Pedal pulses palpable, no clubbing or cyanosis   ABDOMEN: Soft, non-tender to light palpation. Bowel sounds present. No palpable masses; no abdominal bruit  MS: Good muscle strength and tone. No atrophy or abnormal movements. : Deferred  SKIN: Warm and dry no statis dermatitis or ulcers   NEURO / PSYCH: Oriented to person, place and time. Speech clear and appropriate. Follows all commands. Pleasant affect. [place]. DATA:    ECG No Type II HB. Sr with PAC's  Tele strips: SR with PAC's    Diagnostic:      CT Head 4/17/2021: No acute intracranial abnormality. There is age-appropriate atrophy and small-vessel ischemic disease. CT Cervical Spine 4/17/2021: No traumatic injuries of cervical spine. Multilevel degenerative disc disease     CXR 4/17/2021: Atherosclerotic disease, cardiomegaly, and interstitial opacities.  Broad differential which includes edema and potentially infection.  Nodular opacity in the right upper lung    Intake/Output Summary (Last 24 hours) at 4/18/2021 1425  Last data filed at 4/18/2021 0554  Gross per 24 hour   Intake 950 ml   Output 775 ml   Net 175 ml       Labs:   CBC:   Recent Labs     04/17/21  1354 04/18/21  0248   WBC 4.9 5.9   HGB 14.6 13.5   HCT 43.4 40.7    254     BMP:   Recent Labs     04/17/21  1354 04/18/21  0248    143   K 4.1 4.0   CO2 22 23   BUN 18 18   CREATININE 1.0 0.9   LABGLOM >60 >60   CALCIUM 9.3 9.0     Mag:   Recent Labs     04/17/21  1354   MG 2.0     proBNP:   Recent Labs     04/17/21  1354   PROBNP 171     PT/INR:   Recent Labs     04/17/21  1354 04/18/21  0248   PROTIME 11.9 12.7*   INR 1.1 1.2     APTT:  Recent Labs     04/17/21  1354 04/18/21  0248   APTT 26.9 31.3     CARDIAC ENZYMES:  Recent Labs     04/17/21  1354 04/17/21  1852 04/18/21  0248   TROPONINI <0.01 <0.01 <0.01     LIVER PROFILE:  Recent Labs     04/17/21  1354 04/18/21  0248   AST 16 15   ALT 12 11   LABALBU 3.7 3.5   Electronically signed by CHERY Malhotra on 4/18/2021 at 2:25 PM     The above documentation has been prepared under my direction and personally reviewed by me in its entirety. I confirm that the note above accurately reflects all work, treatment, procedures, and medical decision making performed by me.     The patient's history was independently obtained. The patient was independently examined. Electrocardiogram, prior and present cardiovascular assessment, and laboratory studies were reviewed.     The patient is an 12-year-old white male with no association to [facility] Cardiology and no known structural heart disease. He has previous health issues are limited to that of hyperlipidemia and a prior history of carcinoma of the prostate. He is normally active with functional capabilities of approximately 4-5 mMETs and no active symptoms of anginal-like chest discomfort or other ischemic equivalents, decompensated left ventricular systolic dysfunction or volume overload nor arrhythmia related manifestations.   He remained in his usual state of health until the day of hospitalization when following brunch with his wife and going to place items in a trash can upon leaning over the trash can he became lightheaded with an apparent transient loss of consciousness. He denies any additional premonitory symptoms and specifically denies chest discomfort, dyspnea, diaphoresis or an additional prodrome. Upon regaining consciousness, he related no additional symptomatology and subsequently presented to the emergency room where a resting electrocardiogram reviewed at the time of evaluation demonstrated evidence of sinus rhythm with occasional supraventricular ectopy and a right bundle branch block conduction pattern and a chest x-ray again reviewed and somewhat limited by rotation demonstrating no evidence of significant cardiomegaly with mild interstitial changes and a right upper lobe nodule. Subsequent to hospitalization, arrhythmia monitoring demonstrates evidence of sinus rhythm/sinus bradycardia with occasional supraventricular ectopy with no additional cardiac arrhythmias or conduction abnormalities identified. Cardiac biomarkers were normal with a proBNP level of 170 pg/mL and no metabolic derangements.     At the time of evaluation, his medications and allergies were reviewed as well as that of his past medical history and review of systems as documented.     On examination, he presently appears in no discomfort nor distress with an abrasion noted on his right frontal region. He is hemodynamically stable vital signs as documented. Jugular venous pressure is normal with no identified carotid bruits. Lung fields are clear to auscultation. Cardiac examination still for a regular rate and rhythm with no gallop rhythm or cardiac murmur. A benign abdominal examination is present no peripheral edema identified. No focal neurologic findings are present.     Diagnostic Assessment and Plan:  On a clinical basis, the patient presents following an apparent syncopal episode of uncertain origin without significant premonitory symptoms. At present no cardiovascular etiology has been definitively identified with ongoing arrhythmia monitoring during the course of his observation and an echocardiogram to evaluate the presence or absence of structural heart disease. Additional recommendations will follow as appropriate with additional assessment and management of additional noncardiovascular issues deferred to the primary care service. A long-term basis, appropriate lifestyle modification to achieve weight reduction will benefit diastolic cardiac performance as well as that of appropriate risk factor modification of blood pressure and serum lipids to reduce risk of future atherosclerotic development.     Thank you for allowing me to participate in your patient's care. Please feel free to contact me if you have any questions or concerns.     Steven L. Mendel Bold, [address] [facility]

## 2021-04-19 VITALS
BODY MASS INDEX: 30.76 KG/M2 | RESPIRATION RATE: 16 BRPM | SYSTOLIC BLOOD PRESSURE: 123 MMHG | WEIGHT: 191.4 LBS | HEIGHT: 66 IN | DIASTOLIC BLOOD PRESSURE: 69 MMHG | TEMPERATURE: 98.2 F | HEART RATE: 66 BPM | OXYGEN SATURATION: 96 %

## 2021-04-19 LAB
LV EF: 63 %
LVEF MODALITY: NORMAL

## 2021-04-19 PROCEDURE — 6360000002 HC RX W HCPCS: Performed by: STUDENT IN AN ORGANIZED HEALTH CARE EDUCATION/TRAINING PROGRAM

## 2021-04-19 PROCEDURE — 2580000003 HC RX 258: Performed by: STUDENT IN AN ORGANIZED HEALTH CARE EDUCATION/TRAINING PROGRAM

## 2021-04-19 PROCEDURE — 93306 TTE W/DOPPLER COMPLETE: CPT

## 2021-04-19 PROCEDURE — 97161 PT EVAL LOW COMPLEX 20 MIN: CPT

## 2021-04-19 PROCEDURE — 97530 THERAPEUTIC ACTIVITIES: CPT

## 2021-04-19 PROCEDURE — 6370000000 HC RX 637 (ALT 250 FOR IP): Performed by: STUDENT IN AN ORGANIZED HEALTH CARE EDUCATION/TRAINING PROGRAM

## 2021-04-19 RX ADMIN — ENOXAPARIN SODIUM 40 MG: 40 INJECTION SUBCUTANEOUS at 08:42

## 2021-04-19 RX ADMIN — Medication 10 ML: at 08:43

## 2021-04-19 RX ADMIN — ASPIRIN 81 MG: 81 TABLET, COATED ORAL at 08:42

## 2021-04-19 ASSESSMENT — PAIN SCALES - GENERAL: PAINLEVEL_OUTOF10: 0

## 2021-04-19 NOTE — DISCHARGE SUMMARY
Hospital Medicine Discharge Summary    Patient ID: Ariadne Fitzgerald      Patient's PCP: Jorden Shaw MD    Admit Date: 4/17/2021     Discharge Date:  4/19/21    Admitting Physician: Stephany Abdullahi MD     Discharge Physician: Lisa Vuong MD     Discharge Diagnoses: Active Hospital Problems    Diagnosis Date Noted    Head injury [S09.90XA]     Syncope and collapse [R55] 04/17/2021    AV block, Mobitz II [I44.1] 04/17/2021    Other hyperlipidemia [E78.49] 04/17/2021       The patient was seen and examined on day of discharge and this discharge summary is in conjunction with any daily progress note from day of discharge. Hospital Course:   Patient admitted on 4/17/2021 for an episode of syncope in the setting of sinus bradycardia with occasional ectomy on evaluation in the ED. Cardiology consulted and have recommended ECHO which was obtained and reassuring. Pt orthostatics were negative. Pt did ambulate in halls without symptoms and stated that he felt like his normal self. Discharged to home on 4/19/21 in stable condition with pcp follow up     Will need screening CT chest to be done outpatient following discharge for pulmonary nodule on CXR. Exam:     /69   Pulse 66   Temp 98.2 °F (36.8 °C) (Oral)   Resp 16   Ht 5' 6\" (1.676 m)   Wt 191 lb 6.4 oz (86.8 kg)   SpO2 96%   BMI 30.89 kg/m²   General appearance: No apparent distress, appears stated age and cooperative. HEENT: Pupils equal, round, and reactive to light. Conjunctivae/corneas clear. Neck: Supple. No jugular venous distention. Trachea midline. Respiratory:  Normal respiratory effort. Clear to auscultation, bilaterally without Rales/Wheezes/Rhonchi. Cardiovascular: sinus rhythm with intermittent bradycardia with normal S1/S2 without murmurs, rubs or gallops. Abdomen: Soft, non-tender, non-distended with normal bowel sounds. Musculoskeletal: No clubbing, cyanosis or edema bilaterally. Brisk capillary refill.  2+ lower extremity pulses (dorsalis pedis). Skin:  No rashes    Neurologic: awake, alert and following commands       Consults:     IP CONSULT TO PRIMARY CARE PROVIDER  IP CONSULT TO CARDIOLOGY  IP CONSULT TO HOME CARE NEEDS    Significant Diagnostic Studies:     CT HEAD WO CONTRAST   Final Result   No acute intracranial abnormality. There is age-appropriate atrophy and small-vessel ischemic disease. CT CERVICAL SPINE WO CONTRAST   Final Result   No traumatic injuries of cervical spine. Multilevel degenerative disc disease as described above. XR CHEST PORTABLE   Final Result   1. Atherosclerotic disease, cardiomegaly, and interstitial opacities. Broad   differential which includes edema and potentially infection. Please   correlate with patient's clinical presentation. 2.  Nodular opacity in the right upper lung. RECOMMENDATION:   A screening chest CT is recommended. Disposition:  home     Discharge Instructions/Follow-up:  Keep scheduled follow up appointments. Take medications as prescribed. Code Status:  Full Code     Activity: activity as tolerated    Diet: regular diet    Labs: For convenience and continuity at follow-up the following most recent labs are provided:      CBC:    Lab Results   Component Value Date    WBC 5.9 04/18/2021    HGB 13.5 04/18/2021    HCT 40.7 04/18/2021     04/18/2021       Renal:    Lab Results   Component Value Date     04/18/2021    K 4.0 04/18/2021     04/18/2021    CO2 23 04/18/2021    BUN 18 04/18/2021    CREATININE 0.9 04/18/2021    CALCIUM 9.0 04/18/2021       Discharge Medications:     Current Discharge Medication List           Details   atorvastatin (LIPITOR) 10 MG tablet Take 10 mg by mouth every other day. NIGHTLY              Time Spent on discharge is more than 45 minutes in the examination, evaluation, counseling and review of medications and discharge plan.       Signed:    Demetria Banegas MD   4/19/2021

## 2021-04-19 NOTE — CARE COORDINATION
Spoke with patient. He lives with his spouse, typically independent with all self care. He has a walker but does not use it. Therapist reccomends he uses his walker when he returns. Spouse will bring it today when she transports home. Also discussed homecare for therapy only and patient in agreement. Discussed options and he does not have preference on agency. Referral to [facility] and patient accepted. PCP is through Dr. Bianca Camarena office. Plan is for discharge today. For questions I can be reached at [phone]. Jessica Schmidt [place]    The Plan for Transition of Care is related to the following treatment goals: discharge planning when stable     The Patient and/or patient representative Mira Barraza was provided with a choice of provider and agrees   with the discharge plan. [x] Yes [] No    Freedom of choice list was provided with basic dialogue that supports the patient's individualized plan of care/goals, treatment preferences and shares the quality data associated with the providers.  [x] Yes [] No

## 2021-04-19 NOTE — PROGRESS NOTES
Physical Therapy    Physical Therapy Initial Assessment     Name: Mabel Millard  : 1932  MRN: [id number]    Referring Provider:        Staci Hull MD         Date of Service: 2021    Evaluating PT:  Sandra Wilberto PT, DPT [id number]     Room #:  3522/5398-F  Diagnosis:  Syncope and collapse   PMHx/PSHx:  Prostate CA, HLD  Precautions:  Falls  Equipment Needs:  FWW -- pt already owns, states his wife can bring it in for him     SUBJECTIVE:    Pt lives with his wife in a tri-level story home with no stairs to enter. There are 5 steps with rail to kitchen and living area with additional 7 steps with rail to bedroom. Pt ambulated with no AD PTA. Owns SPC, [address], and W/C. OBJECTIVE:   Initial Evaluation  Date: 21 Treatment Short Term/ Long Term   Goals   AM-PAC 6 Clicks 83/58     Was pt agreeable to Eval/treatment? Yes     Does pt have pain?  No c/o pain      Bed Mobility  Rolling: Supervision  Supine to sit: Supervision  Sit to supine: NT  Scooting: Supervision  Modified Independent     Transfers Sit to stand: SBA  Stand to sit: SBA  Stand pivot: SBA  Modified Independent     Ambulation    300 feet with [address] SBA  >400 feet with AAD if needed Modified Independent     Stair negotiation: ascended and descended  4 steps with 1 rail CGA  >8 steps with 1 rail Modified Independent     ROM BUE:  WFL  BLE:  WFL     Strength BUE:  NT  BLE:  Grossly 5/5     Balance Sitting EOB:  Independent   Dynamic Standing:  SBA  Sitting EOB:  Independent   Dynamic Standing:  Modified Independent       Pt is A & O x 4  Sensation:  Pt denies numbness and tingling to extremities  Edema:  Unremarkable     Vitals:  SpO2 98%, HR 92 bpm on RA after ambulation/activity     Therapeutic Exercises:     BLE ROM    Patient education  Pt educated on safety during functional mobility, use of WW to improve safety and steadiness     Patient response to education:   Pt verbalized understanding Pt demonstrated skill Pt requires further Complexity PT evaluation [id number]  [] High Complexity PT evaluation [id number]  [] PT Re-evaluation [id number]  [] Gait training [id number] -- minutes  [] Manual therapy 16722 -- minutes  [x] Therapeutic activities 59438 8 minutes  [] Therapeutic exercises 52915 -- minutes  [] Neuromuscular reeducation 47207 -- minutes     Sanford Hoover, PT, DPT  [id number]

## 2021-04-20 ENCOUNTER — CARE COORDINATION (OUTPATIENT)
Dept: CASE MANAGEMENT | Age: 86
End: 2021-04-20

## 2021-04-20 NOTE — CARE COORDINATION
appointment scheduling offered: Patient states he will contact Dr. Armin Saeed office to schedule appt with JUSTIN Nguyen. Is follow up appointment scheduled within 7 days of discharge? Patient states he will contact Dr. Armin Saeed office to schedule appt with JUSTIN Nguyen. Non-[facility] follow up appointment(s): Patient states he will contact Dr. Armin Saeed office to schedule appt with JUSTIN Nguyen. Plan for follow-up call in 7-10 days based on severity of symptoms and risk factors. Plan for next call: symptom management-dizziness, syncopal episodes, follow up appointment-Scheduled with JUSTIN Nguyen and Dr. Alicja Stacy and medication management-Med changes or questions. CTN provided contact information for future needs. Non-face-to-face services provided:  Scheduled appointment with PCP-Patient states he will contact Dr. Armin Saeed office to schedule appt with JUSTIN Nguyen. Scheduled appointment with Specialist-Patient reports he is going to contact Dr. Alicja Stacy' office today to schedule appt. Obtained and reviewed discharge summary and/or continuity of care documents  Communication with home health agencies or other community services the patient is currently using-Left message at [address] regarding schedule for soc. Care Transitions 24 Hour Call    Do you have any ongoing symptoms?: No  Do you have a copy of your discharge instructions?: Yes  Do you have all of your prescriptions and are they filled?: Yes  Have you been contacted by a [employer] [address]?: No  Have you scheduled your follow up appointment?: No  Were you discharged with any Home Care or Post Acute Services: Yes  Post Acute Services: Home Health (Comment: [address])  Do you feel like you have everything you need to keep you well at home?: Yes  Care Transitions Interventions       Spoke with patient for initial BPCI care transition call post hospital discharge.   Patient is agreeable to follow up post discharge from the hospital. Med review completed. 1111f not entered. Patient presented to the emergency department on 4/17/21 for syncope and fall with head injury. Patient denies falls, reports he got a good night's sleep. Patient reports he is moving slower, not using ad. This CTN notified patient recommendation is to utilize ww. Patient reports he is eating and hydrating. Patient states he drinks 3-4 cups of coffee daily. Patient encouraged to drink decaf products. Patient reports he drives. Patient denies dizziness at this time. CTN explained that a member of the Care Transition Central Team will be contacting patient for further follow up calls. Patient is in agreement and denies any further needs or concerns at this time. Follow Up  No future appointments.     Atul Santos RN

## 2021-04-20 NOTE — CARE COORDINATION
Received email from [facility] at [address] [facility], [address] [place] [address] scheduled for today. Patient updated.   States he did receive a call from the PT.

## 2021-04-27 ENCOUNTER — CARE COORDINATION (OUTPATIENT)
Dept: CASE MANAGEMENT | Age: 86
End: 2021-04-27

## 2021-04-27 NOTE — CARE COORDINATION
[address] for Care Improvement ([facility]) Follow Up Call  Qualifying Diagnosis related to Cardiovascular Disease. 4/27/2021  Patient Name:  Becca Jordan   YOB: 1932  Discharge Date:  4/19/21  RARS:  Readmission Risk Score: 8    PCP:  Kai Raza MD     Message left in compliance with HIPPA. Stated who I was, representing the [facility], Care Transitions Team. Requested to place a call to their Physician with any immediate health needs/questions/concerns.      Future Appointments   Date Time Provider Mike Pinedo   5/26/2021  1:00 PM Kedar Monk MD [address] Transitions will continue to follow per [facility] , RN, CTN

## 2021-05-03 ENCOUNTER — TELEPHONE (OUTPATIENT)
Dept: CARDIOLOGY CLINIC | Age: 86
End: 2021-05-03

## 2021-05-03 LAB — SARS-COV-2: DETECTED

## 2021-05-04 ENCOUNTER — CARE COORDINATION (OUTPATIENT)
Dept: CASE MANAGEMENT | Age: 86
End: 2021-05-04

## 2021-05-04 NOTE — CARE COORDINATION
[facility] Transitions Follow Up Call    2021    Patient: Sukhwinder Lamas  Patient : 1932   MRN: [id number]  Reason for Admission:   Discharge Date: 21 RARS: Readmission Risk Score: 8         Spoke with: Patient, [facility] Transitions Subsequent and Final Call    Subsequent and Final Calls  Do you have any ongoing symptoms?: No  Do you have any questions related to your medications?: No  Do you currently have any active services?: No  Are you currently active with any services?: No, patient declined services  Do you have any needs or concerns that I can assist you with?: No  Identified Barriers: None  Care Transitions Interventions  No Identified Needs    Patient is pleasant in conversation.   -denies any C/O chest pain, palpitations, shortness of breath, lightheaded, or dizziness   -denies any syncopal episodes  -denies any swelling   -maintaining weight   -eating and drinking without difficulty   -reports normal bladder/bowel elimination   -ambulates independently; no further falls   -patient reports his spouse tested positive for COVID 19 and is in self-quarantine   -patient reports he followed up with PCP on 5/3/2021 for COVID 19 testing and is awaiting results    Emotional support provided; discussed will continue to follow.     PLAN NEXT CALL; check on COVID 19 testing results       Follow Up  Future Appointments   Date Time Provider Mike Pinedo   2021 11:30 AM MD Kevin Verdugo [address]

## 2021-05-11 ENCOUNTER — CARE COORDINATION (OUTPATIENT)
Dept: CASE MANAGEMENT | Age: 86
End: 2021-05-11

## 2021-05-11 NOTE — CARE COORDINATION
[facility] Trans[facility] Follow Up Call    2021    Patient: Daria Boswell  Patient : 1932   MRN: <[id number]>  Reason for Admission:   Discharge Date: 21 RARS: Readmission Risk Score: 8    Attempted to contact patient for BPCI-A follow up. Unable to reach patient. Call picked up. CTN attempted to introduce self and call was disconnected. Will try again at a later time.       Follow Up  Future Appointments   Date Time Provider Mike Pinedo   2021 11:30 AM Vishnu Kahn MD [facility] Sheila Cao RN

## 2021-05-14 ENCOUNTER — HOSPITAL ENCOUNTER (INPATIENT)
Age: 86
LOS: 3 days | Discharge: HOME | DRG: 177 | End: 2021-05-17
Attending: EMERGENCY MEDICINE | Admitting: INTERNAL MEDICINE
Payer: MEDICARE

## 2021-05-14 ENCOUNTER — APPOINTMENT (OUTPATIENT)
Dept: GENERAL RADIOLOGY | Age: 86
DRG: 177 | End: 2021-05-14
Payer: MEDICARE

## 2021-05-14 ENCOUNTER — APPOINTMENT (OUTPATIENT)
Dept: CT IMAGING | Age: 86
DRG: 177 | End: 2021-05-14
Payer: MEDICARE

## 2021-05-14 DIAGNOSIS — J96.01 ACUTE RESPIRATORY FAILURE WITH HYPOXIA (HCC): Primary | ICD-10-CM

## 2021-05-14 PROBLEM — J96.00 ACUTE RESPIRATORY FAILURE DUE TO COVID-19 (HCC): Status: ACTIVE | Noted: 2021-05-14

## 2021-05-14 PROBLEM — J96.00 ACUTE RESPIRATORY FAILURE DUE TO SEVERE ACUTE RESPIRATORY SYNDROME CORONAVIRUS 2 (SARS-COV-2) INFECTION (HCC): Status: ACTIVE | Noted: 2021-05-14

## 2021-05-14 PROBLEM — U07.1 ACUTE RESPIRATORY FAILURE DUE TO SEVERE ACUTE RESPIRATORY SYNDROME CORONAVIRUS 2 (SARS-COV-2) INFECTION (HCC): Status: ACTIVE | Noted: 2021-05-14

## 2021-05-14 PROBLEM — U07.1 ACUTE RESPIRATORY FAILURE DUE TO COVID-19 (HCC): Status: ACTIVE | Noted: 2021-05-14

## 2021-05-14 LAB
ALBUMIN SERPL-MCNC: 3.5 G/DL (ref 3.5–5.2)
ALP BLD-CCNC: 85 U/L (ref 40–129)
ALT SERPL-CCNC: 13 U/L (ref 0–40)
ANION GAP SERPL CALCULATED.3IONS-SCNC: 12 MMOL/L (ref 7–16)
AST SERPL-CCNC: 22 U/L (ref 0–39)
BACTERIA: ABNORMAL /HPF
BASOPHILS ABSOLUTE: 0.01 E9/L (ref 0–0.2)
BASOPHILS RELATIVE PERCENT: 0.2 % (ref 0–2)
BILIRUB SERPL-MCNC: 0.9 MG/DL (ref 0–1.2)
BILIRUBIN URINE: NEGATIVE
BLOOD, URINE: NEGATIVE
BUN BLDV-MCNC: 15 MG/DL (ref 6–23)
CALCIUM SERPL-MCNC: 8.6 MG/DL (ref 8.6–10.2)
CHLORIDE BLD-SCNC: 99 MMOL/L (ref 98–107)
CLARITY: CLEAR
CO2: 25 MMOL/L (ref 22–29)
COLOR: YELLOW
CREAT SERPL-MCNC: 1 MG/DL (ref 0.7–1.2)
EOSINOPHILS ABSOLUTE: 0 E9/L (ref 0.05–0.5)
EOSINOPHILS RELATIVE PERCENT: 0 % (ref 0–6)
GFR AFRICAN AMERICAN: >60
GFR NON-AFRICAN AMERICAN: >60 ML/MIN/1.73
GLUCOSE BLD-MCNC: 96 MG/DL (ref 74–99)
GLUCOSE URINE: NEGATIVE MG/DL
HCT VFR BLD CALC: 44 % (ref 37–54)
HEMOGLOBIN: 14.6 G/DL (ref 12.5–16.5)
IMMATURE GRANULOCYTES #: 0.02 E9/L
IMMATURE GRANULOCYTES %: 0.4 % (ref 0–5)
KETONES, URINE: 15 MG/DL
LEUKOCYTE ESTERASE, URINE: NEGATIVE
LYMPHOCYTES ABSOLUTE: 0.49 E9/L (ref 1.5–4)
LYMPHOCYTES RELATIVE PERCENT: 9.7 % (ref 20–42)
MCH RBC QN AUTO: 30.7 PG (ref 26–35)
MCHC RBC AUTO-ENTMCNC: 33.2 % (ref 32–34.5)
MCV RBC AUTO: 92.4 FL (ref 80–99.9)
MONOCYTES ABSOLUTE: 0.28 E9/L (ref 0.1–0.95)
MONOCYTES RELATIVE PERCENT: 5.6 % (ref 2–12)
MUCUS: PRESENT /LPF
NEUTROPHILS ABSOLUTE: 4.23 E9/L (ref 1.8–7.3)
NEUTROPHILS RELATIVE PERCENT: 84.1 % (ref 43–80)
NITRITE, URINE: NEGATIVE
OVALOCYTES: ABNORMAL
PDW BLD-RTO: 12.5 FL (ref 11.5–15)
PH UA: 5.5 (ref 5–9)
PLATELET # BLD: 201 E9/L (ref 130–450)
PMV BLD AUTO: 10.1 FL (ref 7–12)
POIKILOCYTES: ABNORMAL
POTASSIUM REFLEX MAGNESIUM: 4.1 MMOL/L (ref 3.5–5)
PRO-BNP: 378 PG/ML (ref 0–450)
PROTEIN UA: 30 MG/DL
RBC # BLD: 4.76 E12/L (ref 3.8–5.8)
RBC UA: ABNORMAL /HPF (ref 0–2)
SODIUM BLD-SCNC: 136 MMOL/L (ref 132–146)
SPECIFIC GRAVITY UA: 1.02 (ref 1–1.03)
TOTAL PROTEIN: 7 G/DL (ref 6.4–8.3)
TROPONIN: <0.01 NG/ML (ref 0–0.03)
UROBILINOGEN, URINE: 1 E.U./DL
WBC # BLD: 5 E9/L (ref 4.5–11.5)
WBC UA: ABNORMAL /HPF (ref 0–5)

## 2021-05-14 PROCEDURE — 6360000002 HC RX W HCPCS: Performed by: EMERGENCY MEDICINE

## 2021-05-14 PROCEDURE — 80053 COMPREHEN METABOLIC PANEL: CPT

## 2021-05-14 PROCEDURE — 96374 THER/PROPH/DIAG INJ IV PUSH: CPT

## 2021-05-14 PROCEDURE — 71046 X-RAY EXAM CHEST 2 VIEWS: CPT

## 2021-05-14 PROCEDURE — 85025 COMPLETE CBC W/AUTO DIFF WBC: CPT

## 2021-05-14 PROCEDURE — 2060000000 HC ICU INTERMEDIATE R&B

## 2021-05-14 PROCEDURE — 84484 ASSAY OF TROPONIN QUANT: CPT

## 2021-05-14 PROCEDURE — 81001 URINALYSIS AUTO W/SCOPE: CPT

## 2021-05-14 PROCEDURE — 93005 ELECTROCARDIOGRAM TRACING: CPT | Performed by: PHYSICIAN ASSISTANT

## 2021-05-14 PROCEDURE — 2580000003 HC RX 258: Performed by: STUDENT IN AN ORGANIZED HEALTH CARE EDUCATION/TRAINING PROGRAM

## 2021-05-14 PROCEDURE — 99284 EMERGENCY DEPT VISIT MOD MDM: CPT

## 2021-05-14 PROCEDURE — 83880 ASSAY OF NATRIURETIC PEPTIDE: CPT

## 2021-05-14 PROCEDURE — 6360000002 HC RX W HCPCS: Performed by: INTERNAL MEDICINE

## 2021-05-14 PROCEDURE — XW033E5 INTRODUCTION OF REMDESIVIR ANTI-INFECTIVE INTO PERIPHERAL VEIN, PERCUTANEOUS APPROACH, NEW TECHNOLOGY GROUP 5: ICD-10-PCS | Performed by: INTERNAL MEDICINE

## 2021-05-14 PROCEDURE — 71275 CT ANGIOGRAPHY CHEST: CPT

## 2021-05-14 PROCEDURE — 2580000003 HC RX 258: Performed by: INTERNAL MEDICINE

## 2021-05-14 PROCEDURE — 6360000004 HC RX CONTRAST MEDICATION: Performed by: RADIOLOGY

## 2021-05-14 PROCEDURE — 2500000003 HC RX 250 WO HCPCS: Performed by: INTERNAL MEDICINE

## 2021-05-14 RX ORDER — SODIUM CHLORIDE 0.9 % (FLUSH) 0.9 %
5-40 SYRINGE (ML) INJECTION PRN
Status: DISCONTINUED | OUTPATIENT
Start: 2021-05-14 | End: 2021-05-17 | Stop reason: HOSPADM

## 2021-05-14 RX ORDER — POLYETHYLENE GLYCOL 3350 17 G/17G
17 POWDER, FOR SOLUTION ORAL DAILY PRN
Status: DISCONTINUED | OUTPATIENT
Start: 2021-05-14 | End: 2021-05-17 | Stop reason: HOSPADM

## 2021-05-14 RX ORDER — ACETAMINOPHEN 325 MG/1
650 TABLET ORAL EVERY 6 HOURS PRN
Status: DISCONTINUED | OUTPATIENT
Start: 2021-05-14 | End: 2021-05-17 | Stop reason: HOSPADM

## 2021-05-14 RX ORDER — 0.9 % SODIUM CHLORIDE 0.9 %
1000 INTRAVENOUS SOLUTION INTRAVENOUS ONCE
Status: COMPLETED | OUTPATIENT
Start: 2021-05-14 | End: 2021-05-14

## 2021-05-14 RX ORDER — VITAMIN B COMPLEX
2000 TABLET ORAL DAILY
Status: DISCONTINUED | OUTPATIENT
Start: 2021-05-15 | End: 2021-05-17 | Stop reason: HOSPADM

## 2021-05-14 RX ORDER — ACETAMINOPHEN 650 MG/1
650 SUPPOSITORY RECTAL EVERY 6 HOURS PRN
Status: DISCONTINUED | OUTPATIENT
Start: 2021-05-14 | End: 2021-05-17 | Stop reason: HOSPADM

## 2021-05-14 RX ORDER — 0.9 % SODIUM CHLORIDE 0.9 %
30 INTRAVENOUS SOLUTION INTRAVENOUS PRN
Status: DISCONTINUED | OUTPATIENT
Start: 2021-05-14 | End: 2021-05-17 | Stop reason: HOSPADM

## 2021-05-14 RX ORDER — ACETAMINOPHEN 325 MG/1
650 TABLET ORAL EVERY 6 HOURS PRN
Status: DISCONTINUED | OUTPATIENT
Start: 2021-05-14 | End: 2021-05-14 | Stop reason: SDUPTHER

## 2021-05-14 RX ORDER — ONDANSETRON 2 MG/ML
4 INJECTION INTRAMUSCULAR; INTRAVENOUS EVERY 6 HOURS PRN
Status: DISCONTINUED | OUTPATIENT
Start: 2021-05-14 | End: 2021-05-17 | Stop reason: HOSPADM

## 2021-05-14 RX ORDER — SODIUM CHLORIDE 9 MG/ML
25 INJECTION, SOLUTION INTRAVENOUS PRN
Status: DISCONTINUED | OUTPATIENT
Start: 2021-05-14 | End: 2021-05-17 | Stop reason: HOSPADM

## 2021-05-14 RX ORDER — SODIUM CHLORIDE 0.9 % (FLUSH) 0.9 %
5-40 SYRINGE (ML) INJECTION EVERY 12 HOURS SCHEDULED
Status: DISCONTINUED | OUTPATIENT
Start: 2021-05-14 | End: 2021-05-17 | Stop reason: HOSPADM

## 2021-05-14 RX ORDER — PROMETHAZINE HYDROCHLORIDE 25 MG/1
12.5 TABLET ORAL EVERY 6 HOURS PRN
Status: DISCONTINUED | OUTPATIENT
Start: 2021-05-14 | End: 2021-05-17 | Stop reason: HOSPADM

## 2021-05-14 RX ORDER — ACETAMINOPHEN 650 MG/1
650 SUPPOSITORY RECTAL EVERY 6 HOURS PRN
Status: DISCONTINUED | OUTPATIENT
Start: 2021-05-14 | End: 2021-05-14 | Stop reason: SDUPTHER

## 2021-05-14 RX ORDER — ATORVASTATIN CALCIUM 10 MG/1
10 TABLET, FILM COATED ORAL EVERY OTHER DAY
Status: DISCONTINUED | OUTPATIENT
Start: 2021-05-15 | End: 2021-05-17 | Stop reason: HOSPADM

## 2021-05-14 RX ORDER — DEXAMETHASONE SODIUM PHOSPHATE 10 MG/ML
6 INJECTION, SOLUTION INTRAMUSCULAR; INTRAVENOUS ONCE
Status: COMPLETED | OUTPATIENT
Start: 2021-05-14 | End: 2021-05-14

## 2021-05-14 RX ADMIN — REMDESIVIR 200 MG: 100 INJECTION, POWDER, LYOPHILIZED, FOR SOLUTION INTRAVENOUS at 23:25

## 2021-05-14 RX ADMIN — DEXAMETHASONE SODIUM PHOSPHATE 6 MG: 10 INJECTION, SOLUTION INTRAMUSCULAR; INTRAVENOUS at 20:22

## 2021-05-14 RX ADMIN — SODIUM CHLORIDE 1000 ML: 9 INJECTION, SOLUTION INTRAVENOUS at 15:13

## 2021-05-14 RX ADMIN — SODIUM CHLORIDE, PRESERVATIVE FREE 10 ML: 5 INJECTION INTRAVENOUS at 23:25

## 2021-05-14 RX ADMIN — IOPAMIDOL 75 ML: 755 INJECTION, SOLUTION INTRAVENOUS at 16:36

## 2021-05-14 RX ADMIN — ENOXAPARIN SODIUM 30 MG: 30 INJECTION SUBCUTANEOUS at 23:24

## 2021-05-14 ASSESSMENT — ENCOUNTER SYMPTOMS
VOMITING: 0
NAUSEA: 0
DIARRHEA: 0
SHORTNESS OF BREATH: 0
SORE THROAT: 0
COUGH: 1
BACK PAIN: 0
ABDOMINAL PAIN: 0

## 2021-05-14 NOTE — ED PROVIDER NOTES
80-year-old male sent from PCP for low pulse ox. He was diagnosed with Covid last week, he states since Monday this week has been feeling \"worn down. \"  He has not been eating much and thinks he has lost about 10 pounds in the past week or so. He states he has been trying to keep up with fluids and has been drinking coffee/tea, and water at home. He was on a Z-Cornel and finished it. He has had a cough productive of clear sputum and currently endorses chills. He is denying fever, chest pain, shortness of breath, abdominal pain, dysuria, dizziness, and lightheadedness. Fatigue  Severity:  Moderate  Onset quality:  Gradual  Duration:  5 days  Timing:  Constant  Context: recent infection    Relieved by:  Nothing  Worsened by:  Nothing  Associated symptoms: cough    Associated symptoms: no abdominal pain, no chest pain, no diarrhea, no dizziness, no dysuria, no fever, no headaches, no myalgias, no nausea, no shortness of breath and no vomiting         Review of Systems   Constitutional: Positive for appetite change, chills and fatigue. Negative for fever. HENT: Negative for congestion and sore throat. Eyes: Negative for visual disturbance. Respiratory: Positive for cough. Negative for shortness of breath. Cardiovascular: Negative for chest pain and leg swelling. Gastrointestinal: Negative for abdominal pain, diarrhea, nausea and vomiting. Endocrine: Negative for polyuria. Genitourinary: Negative for dysuria and hematuria. Musculoskeletal: Negative for back pain and myalgias. Skin: Negative for rash. Neurological: Negative for dizziness, light-headedness and headaches. Physical Exam  Constitutional:       General: He is not in acute distress. Appearance: He is well-developed and normal weight. He is not ill-appearing or toxic-appearing. Comments: Appears tired   HENT:      Head: Normocephalic and atraumatic.       Mouth/Throat:      Comments: Dry mucous membranes  Neck: Musculoskeletal: Normal range of motion and neck supple. Cardiovascular:      Rate and Rhythm: Normal rate and regular rhythm. Pulmonary:      Effort: Pulmonary effort is normal. Tachypnea present. Comments: Decreased breath sounds throughout, crackles bilateral bases; work of breathing somewhat increased  Abdominal:      Palpations: Abdomen is soft. Tenderness: There is no abdominal tenderness. Musculoskeletal:      Right lower leg: No edema. Left lower leg: No edema. Skin:     General: Skin is warm and dry. Neurological:      General: No focal deficit present. Mental Status: He is alert. Psychiatric:         Mood and Affect: Mood normal.         Behavior: Behavior normal.          Procedures     MDM  Number of Diagnoses or Management Options  Acute respiratory failure with hypoxia [facility])  Diagnosis management comments: 66-year-old male with Covid presenting for hypoxia. Chest x-ray showed bilateral pneumonia. CTA was obtained due to hypoxia but not show any PE.  EKG stable, troponins negative. Patient was ambulated and SPO2 dropped to 89% and patient had increased work of breathing. Therefore decision was made to admit patient for further care. Dr. Declan Benites was consulted and agreed to admit patient. ED Course as of May 15 0055   Fri May 14, 2021   1629 Patient resting in bed comfortably. No complaints at this time.    [AP]   1954 Decadron ordered. Case discussed with Dr. Declan Benites who will admit. [CF]      ED Course User Index  [AP] Festus Navarro MD  [CF] Liliana Willis, DO       --------------------------------------------- PAST HISTORY ---------------------------------------------  Past Medical History:  has a past medical history of Cancer ([address] Hyperlipidemia, and Urethral stricture. Past Surgical History:  has a past surgical history that includes Cystocopy (1996) and Cystocopy (07/10/2012). Social History:  reports that he has never smoked.  He has never used smokeless tobacco. He reports that he does not drink alcohol or use drugs. Family History: family history is not on file. The patients home medications have been reviewed. Allergies: Patient has no known allergies.     -------------------------------------------------- RESULTS -------------------------------------------------    LABS:  Results for orders placed or performed during the hospital encounter of 05/14/21   CBC Auto Differential   Result Value Ref Range    WBC 5.0 4.5 - 11.5 E9/L    RBC 4.76 3.80 - 5.80 E12/L    Hemoglobin 14.6 12.5 - 16.5 g/dL    Hematocrit 44.0 37.0 - 54.0 %    MCV 92.4 80.0 - 99.9 fL    MCH 30.7 26.0 - 35.0 pg    MCHC 33.2 32.0 - 34.5 %    RDW 12.5 11.5 - 15.0 fL    Platelets 498 520 - 585 E9/L    MPV 10.1 7.0 - 12.0 fL    Neutrophils % 84.1 (H) 43.0 - 80.0 %    Immature Granulocytes % 0.4 0.0 - 5.0 %    Lymphocytes % 9.7 (L) 20.0 - 42.0 %    Monocytes % 5.6 2.0 - 12.0 %    Eosinophils % 0.0 0.0 - 6.0 %    Basophils % 0.2 0.0 - 2.0 %    Neutrophils Absolute 4.23 1.80 - 7.30 E9/L    Immature Granulocytes # 0.02 E9/L    Lymphocytes Absolute 0.49 (L) 1.50 - 4.00 E9/L    Monocytes Absolute 0.28 0.10 - 0.95 E9/L    Eosinophils Absolute 0.00 (L) 0.05 - 0.50 E9/L    Basophils Absolute 0.01 0.00 - 0.20 E9/L    Poikilocytes 1+     Ovalocytes 1+    Comprehensive Metabolic Panel w/ Reflex to MG   Result Value Ref Range    Sodium 136 132 - 146 mmol/L    Potassium reflex Magnesium 4.1 3.5 - 5.0 mmol/L    Chloride 99 98 - 107 mmol/L    CO2 25 22 - 29 mmol/L    Anion Gap 12 7 - 16 mmol/L    Glucose 96 74 - 99 mg/dL    BUN 15 6 - 23 mg/dL    CREATININE 1.0 0.7 - 1.2 mg/dL    GFR Non-African American >60 >=60 mL/min/1.73    GFR African American >60     Calcium 8.6 8.6 - 10.2 mg/dL    Total Protein 7.0 6.4 - 8.3 g/dL    Albumin 3.5 3.5 - 5.2 g/dL    Total Bilirubin 0.9 0.0 - 1.2 mg/dL    Alkaline Phosphatase 85 40 - 129 U/L    ALT 13 0 - 40 U/L    AST 22 0 - 39 U/L   Troponin   Result Value Ref Range    Troponin <0.01 0.00 - 0.03 ng/mL   Brain Natriuretic Peptide   Result Value Ref Range    Pro- 0 - 450 pg/mL   Urinalysis   Result Value Ref Range    Color, UA Yellow Straw/Yellow    Clarity, UA Clear Clear    Glucose, Ur Negative Negative mg/dL    Bilirubin Urine Negative Negative    Ketones, Urine 15 (A) Negative mg/dL    Specific Gravity, UA 1.025 1.005 - 1.030    Blood, Urine Negative Negative    pH, UA 5.5 5.0 - 9.0    Protein, UA 30 (A) Negative mg/dL    Urobilinogen, Urine 1.0 <2.0 E.U./dL    Nitrite, Urine Negative Negative    Leukocyte Esterase, Urine Negative Negative   Microscopic Urinalysis   Result Value Ref Range    Mucus, UA Present (A) None Seen /LPF    WBC, UA NONE 0 - 5 /HPF    RBC, UA NONE 0 - 2 /HPF    Bacteria, UA MODERATE (A) None Seen /HPF   EKG 12 Lead   Result Value Ref Range    Ventricular Rate 72 BPM    Atrial Rate 72 BPM    P-R Interval 154 ms    QRS Duration 128 ms    Q-T Interval 414 ms    QTc Calculation (Bazett) 453 ms    P Axis 21 degrees    R Axis -33 degrees    T Axis -7 degrees       RADIOLOGY:  CTA PULMONARY W CONTRAST   Final Result   1. No pulmonary embolism. 2. Diffuse patchy bilateral pulmonary ground-glass opacities. Commonly   reported imaging features of COVID-19 pneumonia are present. Other processes   such as influenza pneumonia and organizing pneumonia, as can be seen with   drug toxicity and connective tissue disease, can cause a similar imaging   pattern. PneTyp   3. Mildly enlarged nonspecific but likely reactive subcarinal lymph node. XR CHEST (2 VW)   Final Result   New bilateral pneumonia. EKG:  This EKG is signed and interpreted by me.     Rate: 72  Rhythm: Sinus  Interpretation: no acute changes  Comparison: stable as compared to patient's most recent EKG      ------------------------- NURSING NOTES AND VITALS REVIEWED ---------------------------  Date / Time Roomed:  5/14/2021  1:58 PM  ED Bed Assignment:

## 2021-05-15 LAB
ANION GAP SERPL CALCULATED.3IONS-SCNC: 10 MMOL/L (ref 7–16)
ATYPICAL LYMPHOCYTE RELATIVE PERCENT: 0.9 % (ref 0–4)
BASOPHILS ABSOLUTE: 0 E9/L (ref 0–0.2)
BASOPHILS RELATIVE PERCENT: 0 % (ref 0–2)
BUN BLDV-MCNC: 15 MG/DL (ref 6–23)
BURR CELLS: ABNORMAL
CALCIUM SERPL-MCNC: 8.5 MG/DL (ref 8.6–10.2)
CHLORIDE BLD-SCNC: 102 MMOL/L (ref 98–107)
CO2: 23 MMOL/L (ref 22–29)
CREAT SERPL-MCNC: 0.9 MG/DL (ref 0.7–1.2)
D DIMER: 805 NG/ML DDU
EKG ATRIAL RATE: 72 BPM
EKG P AXIS: 21 DEGREES
EKG P-R INTERVAL: 154 MS
EKG Q-T INTERVAL: 414 MS
EKG QRS DURATION: 128 MS
EKG QTC CALCULATION (BAZETT): 453 MS
EKG R AXIS: -33 DEGREES
EKG T AXIS: -7 DEGREES
EKG VENTRICULAR RATE: 72 BPM
EOSINOPHILS ABSOLUTE: 0 E9/L (ref 0.05–0.5)
EOSINOPHILS RELATIVE PERCENT: 0 % (ref 0–6)
FIBRINOGEN: 642 MG/DL (ref 225–540)
GFR AFRICAN AMERICAN: >60
GFR NON-AFRICAN AMERICAN: >60 ML/MIN/1.73
GLUCOSE BLD-MCNC: 152 MG/DL (ref 74–99)
HCT VFR BLD CALC: 40.3 % (ref 37–54)
HEMOGLOBIN: 13.9 G/DL (ref 12.5–16.5)
LACTATE DEHYDROGENASE: 211 U/L (ref 135–225)
LYMPHOCYTES ABSOLUTE: 0.15 E9/L (ref 1.5–4)
LYMPHOCYTES RELATIVE PERCENT: 6.1 % (ref 20–42)
MCH RBC QN AUTO: 31.4 PG (ref 26–35)
MCHC RBC AUTO-ENTMCNC: 34.5 % (ref 32–34.5)
MCV RBC AUTO: 91.2 FL (ref 80–99.9)
MONOCYTES ABSOLUTE: 0.09 E9/L (ref 0.1–0.95)
MONOCYTES RELATIVE PERCENT: 4.3 % (ref 2–12)
NEUTROPHILS ABSOLUTE: 1.96 E9/L (ref 1.8–7.3)
NEUTROPHILS RELATIVE PERCENT: 88.7 % (ref 43–80)
NUCLEATED RED BLOOD CELLS: 0 /100 WBC
OVALOCYTES: ABNORMAL
PDW BLD-RTO: 12.5 FL (ref 11.5–15)
PLATELET # BLD: 206 E9/L (ref 130–450)
PMV BLD AUTO: 10.1 FL (ref 7–12)
POTASSIUM REFLEX MAGNESIUM: 3.9 MMOL/L (ref 3.5–5)
PROCALCITONIN: 0.09 NG/ML (ref 0–0.08)
RBC # BLD: 4.42 E12/L (ref 3.8–5.8)
SODIUM BLD-SCNC: 135 MMOL/L (ref 132–146)
TEAR DROP CELLS: ABNORMAL
WBC # BLD: 2.2 E9/L (ref 4.5–11.5)

## 2021-05-15 PROCEDURE — 85378 FIBRIN DEGRADE SEMIQUANT: CPT

## 2021-05-15 PROCEDURE — 85384 FIBRINOGEN ACTIVITY: CPT

## 2021-05-15 PROCEDURE — 6360000002 HC RX W HCPCS: Performed by: INTERNAL MEDICINE

## 2021-05-15 PROCEDURE — 99223 1ST HOSP IP/OBS HIGH 75: CPT | Performed by: INTERNAL MEDICINE

## 2021-05-15 PROCEDURE — 84145 PROCALCITONIN (PCT): CPT

## 2021-05-15 PROCEDURE — 6370000000 HC RX 637 (ALT 250 FOR IP): Performed by: INTERNAL MEDICINE

## 2021-05-15 PROCEDURE — 80048 BASIC METABOLIC PNL TOTAL CA: CPT

## 2021-05-15 PROCEDURE — 2060000000 HC ICU INTERMEDIATE R&B

## 2021-05-15 PROCEDURE — 85025 COMPLETE CBC W/AUTO DIFF WBC: CPT

## 2021-05-15 PROCEDURE — 36415 COLL VENOUS BLD VENIPUNCTURE: CPT

## 2021-05-15 PROCEDURE — 2580000003 HC RX 258: Performed by: INTERNAL MEDICINE

## 2021-05-15 PROCEDURE — 86140 C-REACTIVE PROTEIN: CPT

## 2021-05-15 PROCEDURE — 83615 LACTATE (LD) (LDH) ENZYME: CPT

## 2021-05-15 PROCEDURE — 2500000003 HC RX 250 WO HCPCS: Performed by: INTERNAL MEDICINE

## 2021-05-15 PROCEDURE — 82728 ASSAY OF FERRITIN: CPT

## 2021-05-15 PROCEDURE — 93010 ELECTROCARDIOGRAM REPORT: CPT | Performed by: INTERNAL MEDICINE

## 2021-05-15 RX ADMIN — ENOXAPARIN SODIUM 30 MG: 30 INJECTION SUBCUTANEOUS at 20:39

## 2021-05-15 RX ADMIN — DEXAMETHASONE 6 MG: 4 TABLET ORAL at 08:06

## 2021-05-15 RX ADMIN — SODIUM CHLORIDE, PRESERVATIVE FREE 10 ML: 5 INJECTION INTRAVENOUS at 20:39

## 2021-05-15 RX ADMIN — ENOXAPARIN SODIUM 30 MG: 30 INJECTION SUBCUTANEOUS at 08:07

## 2021-05-15 RX ADMIN — Medication 2000 UNITS: at 08:06

## 2021-05-15 RX ADMIN — REMDESIVIR 100 MG: 100 INJECTION, POWDER, LYOPHILIZED, FOR SOLUTION INTRAVENOUS at 20:39

## 2021-05-15 RX ADMIN — SODIUM CHLORIDE, PRESERVATIVE FREE 10 ML: 5 INJECTION INTRAVENOUS at 08:07

## 2021-05-15 RX ADMIN — ATORVASTATIN CALCIUM 10 MG: 10 TABLET, FILM COATED ORAL at 08:13

## 2021-05-15 ASSESSMENT — PAIN SCALES - GENERAL
PAINLEVEL_OUTOF10: 0
PAINLEVEL_OUTOF10: 0

## 2021-05-15 NOTE — PLAN OF CARE
Problem: Falls - Risk of:  Goal: Will remain free from falls  Description: Will remain free from falls  5/15/2021 0724 by Wolfgang Ribera RN  Outcome: Met This Shift  5/14/2021 2211 by Zachery Helton RN  Outcome: Ongoing  Goal: Absence of physical injury  Description: Absence of physical injury  5/15/2021 0724 by Wolfgang Ribera RN  Outcome: Met This Shift  5/14/2021 2211 by Zachery Helton RN  Outcome: Ongoing

## 2021-05-15 NOTE — PROGRESS NOTES
P Quality Flow/Interdisciplinary Rounds Progress Note        Quality Flow Rounds held on May 15, 2021    Disciplines Attending:  Bedside Nurse, ,  and Nursing Unit Leadership    Mabel Millard was admitted on 5/14/2021  1:58 PM    Anticipated Discharge Date:  Expected Discharge Date: 05/17/21    Disposition:    Louis Score:  Louis Scale Score: 19    Readmission Risk              Risk of Unplanned Readmission:  12           Discussed patient goal for the day, patient clinical progression, and barriers to discharge.   The following Goal(s) of the Day/Commitment(s) have been identified:  wean oxygen, rem day 2      Frandy Polo  May 15, 2021

## 2021-05-15 NOTE — PROGRESS NOTES
Comprehensive Nutrition Assessment    Type and Reason for Visit:  Initial, Positive Nutrition Screen    Nutrition Recommendations/Plan: Continue current diet and start Ensure BID    Nutrition Assessment:  Pt admit w/ COVID-19 and noted decreased PO intake w/ subjective 10# wt loss x past week. Will add ONS to optimize nutrition and continue to monitor. Malnutrition Assessment:  Malnutrition Status: At risk for malnutrition (Comment)    Context:  Acute Illness     Findings of the 6 clinical characteristics of malnutrition:  Energy Intake:  1 - 75% or less of estimated energy requirements for 7 or more days  Weight Loss:  Unable to assess     Body Fat Loss:  Unable to assess     Muscle Mass Loss:  Unable to assess    Fluid Accumulation:  No significant fluid accumulation     Strength:  Not Performed    Estimated Daily Nutrient Needs:  Energy (kcal):  2453-2863; Weight Used for Energy Requirements:  Current     Protein (g):  85-95; Weight Used for Protein Requirements:  Ideal (1.3-1.5)        Fluid (ml/day):  0845-2864; Method Used for Fluid Requirements:  1 ml/kcal      Nutrition Related Findings:  A&Ox4, active BS, abd distention, +1 edema      Wounds:  None       Current Nutrition Therapies:    DIET GENERAL; Anthropometric Measures:  · Height: 5' 6\" (167.6 cm)  · Current Body Weight: 183 lb (83 kg) (5/14 stated wt - UTO updated wt d/t covid)   · Usual Body Weight: 191 lb (86.6 kg) (4/2021 bed scale, per EMR)     · Ideal Body Weight: 142 lbs; % Ideal Body Weight 128.9 %   · BMI: 29.6  · BMI Categories: Overweight (BMI 25.0-29. 9)       Nutrition Diagnosis:   · Inadequate oral intake related to  (2/2 COVID-19) as evidenced by intake 0-25%, poor intake prior to admission    Nutrition Interventions:   Food and/or Nutrient Delivery:  Continue Current Diet, Start Oral Nutrition Supplement  Nutrition Education/Counseling:  Education not indicated   Coordination of Nutrition Care:  Continue to monitor while inpatient    Goals:  pt to consume >50% meals/ONS       Nutrition Monitoring and Evaluation:   Food/Nutrient Intake Outcomes:  Food and Nutrient Intake, Supplement Intake  Physical Signs/Symptoms Outcomes:  Biochemical Data, GI Status, Fluid Status or Edema, Nutrition Focused Physical Findings, Skin, Weight     Discharge Planning:     Too soon to determine     Electronically signed by Carina Russo MS, RD, LD on 5/15/21 at 3:16 PM EDT    Contact: [phone]

## 2021-05-15 NOTE — H&P
[facility] History and Physical      CHIEF COMPLAINT: Weakness and shortness of breath. History of Present Illness: This is a 49-year-old  male with past medical history of hyperlipidemia came from home due to feeling weakness and progressive shortness of breath. History taken from the patient at the bedside, he is it tested Covid positive 1 week back, his wife was tested Covid and shows positive for that reason he was tested. After his Covid test positive he was given azithromycin and took for 5 days. He started developing weakness and progressive shortness of breath for last 5 days for that reason he came here. He require oxygen  2 to 3 L via nasal cannula to maintain his saturation. He also mentioned he felt chills. He did not receive COVID-19 vaccine. He denies any chest pain abdominal pain or any limb weakness. His vitals in ER shows blood pressure 127/67, pulse 60 respiration 18 and temperature 98.3 °F.  Labs in ER shows normal blood chemistry, normal liver function test and cell count normal.  CT angiography done did not show any pulmonary embolism. Informant(s) for H&P:Patient and EMR    REVIEW OF SYSTEMS:  A comprehensive review of systems was negative except for: what is in the HPI      PMH:  Past Medical History:   Diagnosis Date    Cancer ([facility] [address] 1996    PROSTATE    Hyperlipidemia     Urethral stricture        Surgical History:  Past Surgical History:   Procedure Laterality Date    CYSTOSCOPY  1996    SEED IMPLANTATION    CYSTOSCOPY  07/10/2012    retrograde pyelograms urethral dilatation       Medications Prior to Admission:    Prior to Admission medications    Medication Sig Start Date End Date Taking? Authorizing Provider   atorvastatin (LIPITOR) 10 MG tablet Take 10 mg by mouth every other day. NIGHTLY    Yes Historical Provider, MD       Allergies:    Patient has no known allergies. Social History:    reports that he has never smoked.  He has never used smokeless tobacco. He reports that he does not drink alcohol and does not use drugs. Family History:   family history is not on file. Patient could not provide family history. PHYSICAL EXAM:  Vitals:  /67   Pulse 59   Temp 98.3 °F (36.8 °C) (Oral)   Resp 20   Ht 5' 6\" (1.676 m)   Wt 183 lb (83 kg)   SpO2 97%   BMI 29.54 kg/m²     General Appearance: alert and oriented to person, place and time and in no acute distress  Skin: warm and dry  Head: normocephalic and atraumatic  Eyes: pupils equal, round, and reactive to light, extraocular eye movements intact, conjunctivae normal  Neck: neck supple and non tender without mass   Pulmonary/Chest: clear to auscultation bilaterally- no wheezes, rales or rhonchi, normal air movement, no respiratory distress  Cardiovascular: normal rate, normal S1 and S2 and no carotid bruits  Abdomen: soft, non-tender, non-distended, normal bowel sounds, no masses or organomegaly  Extremities: no cyanosis, no clubbing and no edema  Neurologic: no cranial nerve deficit and speech normal        LABS:  Recent Labs     05/14/21  1434      K 4.1   CL 99   CO2 25   BUN 15   CREATININE 1.0   GLUCOSE 96   CALCIUM 8.6       Recent Labs     05/14/21  1434   WBC 5.0   RBC 4.76   HGB 14.6   HCT 44.0   MCV 92.4   MCH 30.7   MCHC 33.2   RDW 12.5      MPV 10.1       No results for input(s): POCGLU in the last 72 hours. Radiology:   CTA PULMONARY W CONTRAST   Final Result   1. No pulmonary embolism. 2. Diffuse patchy bilateral pulmonary ground-glass opacities. Commonly   reported imaging features of COVID-19 pneumonia are present. Other processes   such as influenza pneumonia and organizing pneumonia, as can be seen with   drug toxicity and connective tissue disease, can cause a similar imaging   pattern. PneTyp   3. Mildly enlarged nonspecific but likely reactive subcarinal lymph node. XR CHEST (2 VW)   Final Result   New bilateral pneumonia. EKG:  ventricular ectopic with sinus rhythm    ASSESSMENT:      Active Problems:    Acute respiratory failure due to COVID-19 [facility])    Acute respiratory failure due to severe acute respiratory syndrome coronavirus 2 (SARS-CoV-2) infection ([facility])  Resolved Problems:    * No resolved hospital problems. *      PLAN:    1. Acute respiratory failure due to COVID-19 virus infection: Continue oxygen supplementation, isolation, dexamethasone 6 mg p.o. daily and remdesivir as per protocol. Follow-up Covid focused labs  2. Hyperlipidemia: Continue atorvastatin nightly 10 mg p.o. daily. Code Status: Full  DVT prophylaxis: Lovenox      NOTE: This report was transcribed using voice recognition software. Every effort was made to ensure accuracy; however, inadvertent computerized transcription errors may be present.   Electronically signed by Kristen Goldberg MD on 5/15/2021 at 5:12 AM

## 2021-05-15 NOTE — ED NOTES
Pulse ox while ambulating dropped from 95% to 89%.  Informed provider     Maikol Kc RN  05/14/21 2017

## 2021-05-15 NOTE — PROGRESS NOTES
[facility] Progress Note    Admitting Date and Time: 5/14/2021  1:58 PM  Admit Dx: Acute respiratory failure due to COVID-19 ([facility]) [U07.1, J96.00]  Acute respiratory failure due to severe acute respiratory syndrome coronavirus 2 (SARS-CoV-2) infection ([facility]) [U07.1, J96.00]    Subjective:  Patient is being followed for Acute respiratory failure due to COVID-19 ([address] [U07.1, J96.00]  Acute respiratory failure due to severe acute respiratory syndrome coronavirus 2 (SARS-CoV-2) infection ([facility]) [U07.1, J96.00]   Pt feels somewhat better, still having shortness of breath and feeling weak    ROS: denies fever, chills, cp, n/v, HA unless stated above.      atorvastatin  10 mg Oral Every Other Day    sodium chloride flush  5-40 mL Intravenous 2 times per day    dexamethasone  6 mg Oral Daily    enoxaparin  30 mg Subcutaneous BID    Vitamin D  2,000 Units Oral Daily    remdesivir IVPB  100 mg Intravenous Q24H     sodium chloride flush, 5-40 mL, PRN  sodium chloride, 25 mL, PRN  promethazine, 12.5 mg, Q6H PRN   Or  ondansetron, 4 mg, Q6H PRN  polyethylene glycol, 17 g, Daily PRN  acetaminophen, 650 mg, Q6H PRN   Or  acetaminophen, 650 mg, Q6H PRN  sodium chloride, 30 mL, PRN         Objective:    BP (!) 107/56   Pulse 58   Temp 97.5 °F (36.4 °C) (Oral)   Resp 20   Ht 5' 6\" (1.676 m)   Wt 183 lb (83 kg)   SpO2 94%   BMI 29.54 kg/m²     General Appearance: alert and oriented to person, place and time and in no acute distress  Skin: warm and dry  Head: normocephalic and atraumatic  Eyes: pupils equal, round, and reactive to light, extraocular eye movements intact, conjunctivae normal  Neck: neck supple and non tender without mass   Pulmonary/Chest: Decreased sounds bilaterally- no wheezes, rales or rhonchi, mild respiratory distress  Cardiovascular: normal rate, normal S1 and S2 and no carotid bruits  Abdomen: soft, non-tender, non-distended, normal bowel sounds, no masses or organomegaly  Extremities: no cyanosis, no clubbing and no edema  Neurologic: no cranial nerve deficit and speech normal        Recent Labs     05/14/21  1434 05/15/21  0555    135   K 4.1 3.9   CL 99 102   CO2 25 23   BUN 15 15   CREATININE 1.0 0.9   GLUCOSE 96 152*   CALCIUM 8.6 8.5*       Recent Labs     05/14/21  1434 05/15/21  0555   WBC 5.0 2.2*   RBC 4.76 4.42   HGB 14.6 13.9   HCT 44.0 40.3   MCV 92.4 91.2   MCH 30.7 31.4   MCHC 33.2 34.5   RDW 12.5 12.5    206   MPV 10.1 10.1     Assessment:    Active Problems:    Acute respiratory failure due to COVID-19 [facility])    Acute respiratory failure due to severe acute respiratory syndrome coronavirus 2 (SARS-CoV-2) infection ([facility])  Resolved Problems:    * No resolved hospital problems. *      Plan:  1. Acute hypoxic respiratory failure, due to covid 19 pneumonia, O2 sat was 89% on RA, placed on 2L to maintain O2 sat >90%, treat the underlying process and wean off O2.   2.  COVID 19 pneumonia, started on decadron and remdesivir, monitor inflammatory markers  3. HLD, continue on lipitor      NOTE: This report was transcribed using voice recognition software. Every effort was made to ensure accuracy; however, inadvertent computerized transcription errors may be present.   Electronically signed by Tariq Sahu MD on 5/15/2021 at 9:35 AM

## 2021-05-16 LAB
C-REACTIVE PROTEIN: 4.1 MG/DL (ref 0–0.4)
D DIMER: 829 NG/ML DDU

## 2021-05-16 PROCEDURE — 84145 PROCALCITONIN (PCT): CPT

## 2021-05-16 PROCEDURE — 2060000000 HC ICU INTERMEDIATE R&B

## 2021-05-16 PROCEDURE — 6370000000 HC RX 637 (ALT 250 FOR IP): Performed by: INTERNAL MEDICINE

## 2021-05-16 PROCEDURE — 86140 C-REACTIVE PROTEIN: CPT

## 2021-05-16 PROCEDURE — 99233 SBSQ HOSP IP/OBS HIGH 50: CPT | Performed by: INTERNAL MEDICINE

## 2021-05-16 PROCEDURE — 2700000000 HC OXYGEN THERAPY PER DAY

## 2021-05-16 PROCEDURE — 85378 FIBRIN DEGRADE SEMIQUANT: CPT

## 2021-05-16 PROCEDURE — 2580000003 HC RX 258: Performed by: INTERNAL MEDICINE

## 2021-05-16 PROCEDURE — 6360000002 HC RX W HCPCS: Performed by: INTERNAL MEDICINE

## 2021-05-16 PROCEDURE — 2500000003 HC RX 250 WO HCPCS: Performed by: INTERNAL MEDICINE

## 2021-05-16 PROCEDURE — 36415 COLL VENOUS BLD VENIPUNCTURE: CPT

## 2021-05-16 RX ADMIN — ENOXAPARIN SODIUM 30 MG: 30 INJECTION SUBCUTANEOUS at 20:05

## 2021-05-16 RX ADMIN — SODIUM CHLORIDE, PRESERVATIVE FREE 10 ML: 5 INJECTION INTRAVENOUS at 20:05

## 2021-05-16 RX ADMIN — ENOXAPARIN SODIUM 30 MG: 30 INJECTION SUBCUTANEOUS at 08:53

## 2021-05-16 RX ADMIN — Medication 2000 UNITS: at 08:53

## 2021-05-16 RX ADMIN — REMDESIVIR 100 MG: 100 INJECTION, POWDER, LYOPHILIZED, FOR SOLUTION INTRAVENOUS at 20:05

## 2021-05-16 RX ADMIN — SODIUM CHLORIDE, PRESERVATIVE FREE 10 ML: 5 INJECTION INTRAVENOUS at 08:53

## 2021-05-16 RX ADMIN — DEXAMETHASONE 6 MG: 4 TABLET ORAL at 08:53

## 2021-05-16 NOTE — PROGRESS NOTES
[facility] Progress Note    Admitting Date and Time: 5/14/2021  1:58 PM  Admit Dx: Acute respiratory failure due to COVID-19 ([facility]) [U07.1, J96.00]  Acute respiratory failure due to severe acute respiratory syndrome coronavirus 2 (SARS-CoV-2) infection ([facility]) [U07.1, J96.00]    Subjective:  Patient is being followed for Acute respiratory failure due to COVID-19 ([facility] [address] [U07.1, J96.00]  Acute respiratory failure due to severe acute respiratory syndrome coronavirus 2 (SARS-CoV-2) infection ([facility] [address] [U07.1, J96.00]   Pt feels ok today, still with  having shortness of breath and feeling weak    ROS: denies fever, chills, cp, n/v, HA unless stated above.      atorvastatin  10 mg Oral Every Other Day    sodium chloride flush  5-40 mL Intravenous 2 times per day    dexamethasone  6 mg Oral Daily    enoxaparin  30 mg Subcutaneous BID    Vitamin D  2,000 Units Oral Daily    remdesivir IVPB  100 mg Intravenous Q24H     sodium chloride flush, 5-40 mL, PRN  sodium chloride, 25 mL, PRN  promethazine, 12.5 mg, Q6H PRN   Or  ondansetron, 4 mg, Q6H PRN  polyethylene glycol, 17 g, Daily PRN  acetaminophen, 650 mg, Q6H PRN   Or  acetaminophen, 650 mg, Q6H PRN  sodium chloride, 30 mL, PRN         Objective:    /63   Pulse 59   Temp 96.6 °F (35.9 °C) (Axillary)   Resp 18   Ht 5' 6\" (1.676 m)   Wt 183 lb (83 kg)   SpO2 94%   BMI 29.54 kg/m²     General Appearance: alert and oriented to person, place and time   Skin: warm and dry  Head: normocephalic and atraumatic  Eyes: pupils equal, round, and reactive to light, extraocular eye movements intact, conjunctivae normal  Neck: neck supple and non tender without mass   Pulmonary/Chest: Decreased sounds bilaterally- no wheezes, rales or rhonchi, mild respiratory distress  Cardiovascular: normal rate, normal S1 and S2 and no carotid bruits  Abdomen: soft, non-tender, non-distended, normal bowel sounds, no masses or organomegaly  Extremities: no cyanosis, no

## 2021-05-17 VITALS
RESPIRATION RATE: 18 BRPM | OXYGEN SATURATION: 90 % | TEMPERATURE: 96.7 F | DIASTOLIC BLOOD PRESSURE: 61 MMHG | WEIGHT: 183 LBS | SYSTOLIC BLOOD PRESSURE: 107 MMHG | HEART RATE: 60 BPM | BODY MASS INDEX: 29.41 KG/M2 | HEIGHT: 66 IN

## 2021-05-17 LAB
C-REACTIVE PROTEIN: 1.7 MG/DL (ref 0–0.4)
D DIMER: 537 NG/ML DDU
PROCALCITONIN: 0.06 NG/ML (ref 0–0.08)

## 2021-05-17 PROCEDURE — 36415 COLL VENOUS BLD VENIPUNCTURE: CPT

## 2021-05-17 PROCEDURE — 99239 HOSP IP/OBS DSCHRG MGMT >30: CPT | Performed by: INTERNAL MEDICINE

## 2021-05-17 PROCEDURE — 6360000002 HC RX W HCPCS: Performed by: INTERNAL MEDICINE

## 2021-05-17 PROCEDURE — 86140 C-REACTIVE PROTEIN: CPT

## 2021-05-17 PROCEDURE — 2580000003 HC RX 258: Performed by: INTERNAL MEDICINE

## 2021-05-17 PROCEDURE — 6370000000 HC RX 637 (ALT 250 FOR IP): Performed by: INTERNAL MEDICINE

## 2021-05-17 PROCEDURE — 85378 FIBRIN DEGRADE SEMIQUANT: CPT

## 2021-05-17 RX ORDER — DEXAMETHASONE 6 MG/1
6 TABLET ORAL DAILY
Qty: 7 TABLET | Refills: 0 | Status: SHIPPED | OUTPATIENT
Start: 2021-05-18 | End: 2021-05-25

## 2021-05-17 RX ORDER — CHOLECALCIFEROL (VITAMIN D3) 50 MCG
2000 TABLET ORAL DAILY
Qty: 60 TABLET | Refills: 0 | Status: SHIPPED | OUTPATIENT
Start: 2021-05-18 | End: 2022-02-21 | Stop reason: ALTCHOICE

## 2021-05-17 RX ADMIN — DEXAMETHASONE 6 MG: 4 TABLET ORAL at 08:03

## 2021-05-17 RX ADMIN — ATORVASTATIN CALCIUM 10 MG: 10 TABLET, FILM COATED ORAL at 08:03

## 2021-05-17 RX ADMIN — SODIUM CHLORIDE, PRESERVATIVE FREE 10 ML: 5 INJECTION INTRAVENOUS at 08:03

## 2021-05-17 RX ADMIN — Medication 2000 UNITS: at 08:03

## 2021-05-17 RX ADMIN — ENOXAPARIN SODIUM 30 MG: 30 INJECTION SUBCUTANEOUS at 08:02

## 2021-05-17 ASSESSMENT — PAIN SCALES - GENERAL: PAINLEVEL_OUTOF10: 0

## 2021-05-17 NOTE — CARE COORDINATION
COVID positive 5/10. On day 4 Remdesivir. Phone conversation w/ patient. Explained role of  and plan of care. Lives independently w/ wife in a tri-level house- 6 steps to entrance. Has walker and cane but does not use. [facility]. No [facility]. PCP is Aparna Reece and pharmacy is [facility]. Currently on room air- sat 90%- DME list offered and declined- no preference if home O2 is required at discharge. Plan remains to return home on discharge- declining Lana Sherman.  Will follow Ranjan Shannon, RN case manager

## 2021-05-17 NOTE — DISCHARGE SUMMARY
[facility] Physician Discharge Summary       No follow-up provider specified. Activity level: As tolerated     Dispo: home      Condition on discharge: Stable     Patient ID:  Becca Jordan  [id number]  37 y.o.  9/16/1932    Admit date: 5/14/2021    Discharge date and time:  5/17/2021  2:02 PM    Admission Diagnoses: Active Problems:    Acute respiratory failure due to COVID-19 [facility])    Acute respiratory failure due to severe acute respiratory syndrome coronavirus 2 (SARS-CoV-2) infection ([facility])  Resolved Problems:    * No resolved hospital problems. *      Discharge Diagnoses: Active Problems:    Acute respiratory failure due to COVID-19 [facility])    Acute respiratory failure due to severe acute respiratory syndrome coronavirus 2 (SARS-CoV-2) infection ([facility])  Resolved Problems:    * No resolved hospital problems. *      Consults:  IP CONSULT TO PHARMACY        Hospital Course:   Patient Becca Jordan is a 80 y.o. presented with shortness of breath and fatigue ,found to be in Acute respiratory failure due to COVID-19 ([facility]) [U07.1, J96.00]  Acute respiratory failure due to severe acute respiratory syndrome coronavirus 2 (SARS-CoV-2) infection ([facility]) [U07.1, J96.00]    1. Acute hypoxic respiratory failure, due to covid 19 pneumonia, O2 sat was 89% on RA, placed on 2L to maintain O2 sat >90%, treat the underlying process and weaned off O2.   2.  COVID 19 pneumonia, started on decadron and remdesivir, inflammatory markers remained stable, we will discharge the patient stable home on decadron to follow with his PCP within a week.   3.  HLD, continue on lipitor    Discharge Exam:    General Appearance: alert and oriented to person, place and time   Skin: warm and dry  Head: normocephalic and atraumatic  Eyes: pupils equal, round, and reactive to light, extraocular eye movements intact, conjunctivae normal  Neck: neck supple and non tender without mass   Pulmonary/Chest: Decreased sounds bilaterally- no wheezes, rales or rhonchi, mild respiratory distress  Cardiovascular: normal rate, normal S1 and S2 and no carotid bruits  Abdomen: soft, non-tender, non-distended, normal bowel sounds, no masses or organomegaly  Extremities: no cyanosis, no clubbing and no edema  Neurologic: no cranial nerve deficit and speech normal    No intake/output data recorded. No intake/output data recorded. LABS:  Recent Labs     05/14/21  1434 05/15/21  0555    135   K 4.1 3.9   CL 99 102   CO2 25 23   BUN 15 15   CREATININE 1.0 0.9   GLUCOSE 96 152*   CALCIUM 8.6 8.5*       Recent Labs     05/14/21  1434 05/15/21  0555   WBC 5.0 2.2*   RBC 4.76 4.42   HGB 14.6 13.9   HCT 44.0 40.3   MCV 92.4 91.2   MCH 30.7 31.4   MCHC 33.2 34.5   RDW 12.5 12.5    206   MPV 10.1 10.1       No results for input(s): POCGLU in the last 72 hours. Imaging:  XR CHEST (2 VW)    Result Date: 5/14/2021  EXAMINATION: TWO XRAY VIEWS OF THE CHEST 5/14/2021 1:23 pm COMPARISON: 17 April 2021 HISTORY: ORDERING SYSTEM PROVIDED HISTORY: Shortness of breath TECHNOLOGIST PROVIDED HISTORY: Reason for exam:->Shortness of breath FINDINGS: There is now bilateral airspace disease compatible with pneumonia involving the right upper lobe as well as the left mid to lower lung. Heart and pulmonary vascularity are normal.  Neither costophrenic angle is clearly blunted. New bilateral pneumonia. CTA PULMONARY W CONTRAST    Result Date: 5/14/2021  EXAMINATION: CTA OF THE CHEST 5/14/2021 4:34 pm TECHNIQUE: CTA of the chest was performed after the administration of intravenous contrast.  Multiplanar reformatted images are provided for review. MIP images are provided for review. Dose modulation, iterative reconstruction, and/or weight based adjustment of the mA/kV was utilized to reduce the radiation dose to as low as reasonably achievable. COMPARISON: Chest radiograph 05/14/2021.  HISTORY: ORDERING SYSTEM PROVIDED HISTORY: r/o PE CONTINUE taking these medications    atorvastatin 10 MG tablet  Commonly known as: LIPITOR           Where to Get Your Medications      These medications were sent to [facility] [facility], [address], [address] [place] [address]    Phone: [phone]   · dexamethasone 6 MG tablet  · vitamin D 50 MCG (2000 UT) Tabs tablet           Note that more than 30 minutes was spent in preparing discharge papers, discussing discharge with patient, medication review, etc.    Signed:  Electronically signed by Duane Dyson MD on 5/17/2021 at 2:02 PM

## 2021-05-17 NOTE — PROGRESS NOTES
[facility] Quality Flow/Interdisciplinary Rounds Progress Note        Quality Flow Rounds held on May 17, 2021    Disciplines Attending:  Bedside Nurse, ,  and Nursing Unit Leadership    Rosalba Alejandra was admitted on 5/14/2021  1:58 PM    Anticipated Discharge Date:  Expected Discharge Date: 05/17/21    Disposition:    Louis Score:  Louis Scale Score: 20    Readmission Risk              Risk of Unplanned Readmission:  12           Discussed patient goal for the day, patient clinical progression, and barriers to discharge. The following Goal(s) of the Day/Commitment(s) have been identified:  Wean O2, ambulate, Remdesivir day 4, PO decadron.  Discharge planning      Jones Nelson RN  May 17, 2021

## 2021-05-17 NOTE — ACP (ADVANCE CARE PLANNING)
Advance Care Planning     Advance Care Planning Activator (Inpatient)  Conversation Note      Date of ACP Conversation: 5/17/21    [employer] with: Rhunette Hy    ACP Activator: Ronaldo Morataya RN      Health Care Decision Maker:     Current Designated Health Care Decision Maker:     Primary Decision Maker: Cj Lane - Spouse - [phone]    Secondary Decision Maker: Harshal Del Angel - Other - [phone]  Click here to complete Healthcare Decision Makers including section of the Healthcare Decision Maker Relationship (ie \"Primary\")  Care Preferences    Ventilation: \"If you were in your present state of health and suddenly became very ill and were unable to breathe on your own, what would your preference be about the use of a ventilator (breathing machine) if it were available to you? \"      Would the patient desire the use of ventilator (breathing machine)? :yes    \"If your health worsens and it becomes clear that your chance of recovery is unlikely, what would your preference be about the use of a ventilator (breathing machine) if it were available to you? \"     Would the patient desire the use of ventilator (breathing machine)? :no      Resuscitation  \"CPR works best to restart the heart when there is a sudden event, like a heart attack, in someone who is otherwise healthy. Unfortunately, CPR does not typically restart the heart for people who have serious health conditions or who are very sick. \"    \"In the event your heart stopped as a result of an underlying serious health condition, would you want attempts to be made to restart your heart (answer \"yes\" for attempt to resuscitate) or would you prefer a natural death (answer \"no\" for do not attempt to resuscitate)? \"yes       [x] Yes   [] No   Educated Patient / Gladis Arias regarding differences between Advance Directives and portable DNR orders.     Length of ACP Conversation in minutes:      Conversation Outcomes:  [x] ACP discussion completed  [] Existing advance directive reviewed with patient; no changes to patient's previously recorded wishes  [] New Advance Directive completed  [] Portable Do Not Rescitate prepared for Provider review and signature  [] POLST/POST/MOLST/MOST prepared for Provider review and signature      Follow-up plan:    [] Schedule follow-up conversation to continue planning  [x] Referred individual to Provider for additional questions/concerns   [] Advised patient/agent/surrogate to review completed ACP document and update if needed with changes in condition, patient preferences or care setting    [] This note routed to one or more involved healthcare providers

## 2021-05-18 ENCOUNTER — CARE COORDINATION (OUTPATIENT)
Dept: CASE MANAGEMENT | Age: 86
End: 2021-05-18

## 2021-05-18 LAB
C-REACTIVE PROTEIN: 2.1 MG/DL (ref 0–0.4)
FERRITIN: 952 NG/ML

## 2021-05-18 NOTE — CARE COORDINATION
[facility] Transitions Initial Follow Up Call    Call within 2 business days of discharge: Yes    Patient: Titi Fleming Patient : 1932   MRN: [id number]  Reason for Admission: Acute Respiratory Failure. Discharge Date: 21 RARS: Readmission Risk Score: 12      Last Discharge [facility]       Complaint Diagnosis Description Type Department Provider    21 Shortness of Breath Acute respiratory failure with hypoxia [facility]) ED to Hosp-Admission (Discharged) (ADMITTED) JARVIS Ronquillo MD; Steven Monzon. .. 24 Hour Transition of Care Call:    1st Attempt to contact patient for Initial 24 Hour BPCI Transition from hospital to home Call:   Patient not reached. No answer - unable to leave message. Sounded like someone picked up then hung up X 2. Will continue to follow.       Cherry Ceja LPN    [phone]  [facility] / [address] [place] [address] Transitions 24 Hour Call    Do you have all of your prescriptions and are they filled?: Yes  Care Transitions Interventions         Follow Up  Future Appointments   Date Time Provider Mike Pinedo   2021 11:30 AM MD Ramon Jones 435, LPN

## 2021-05-19 ENCOUNTER — CARE COORDINATION (OUTPATIENT)
Dept: CASE MANAGEMENT | Age: 86
End: 2021-05-19

## 2021-05-19 NOTE — CARE COORDINATION
[facility] Transitions Initial Follow Up Call    Call within 2 business days of discharge: Yes    Patient: Titi Fleming Patient : 1932   MRN: [id number]  Reason for Admission: ARF  Discharge Date: 21 RARS: Readmission Risk Score: 12      Last Discharge [facility]       Complaint Diagnosis Description Type Department Provider    21 Shortness of Breath Acute respiratory failure with hypoxia [facility]) ED to Hosp-Admission (Discharged) (ADMITTED) JARVIS Ronquillo MD; Phu Acuna. .. 24 Hour Transition of Care Call:    2nd Attempt to contact patient for 24 Hour Transition Call - (Discharged from Hospital to Home)  Patient was not reached. No answer - unable to leave message. Attempted to contact patient for follow up BPCI-A transition call. No answer - unable to leave message. Will continue to follow.     Cherry Ceja LPN    [phone]  [facility] / [address] [place] [address] Transitions 24 Hour Call    Do you have all of your prescriptions and are they filled?: Yes  Care Transitions Interventions         Follow Up  Future Appointments   Date Time Provider Mike Pinedo   2021 11:30 AM MD Ramon Jones, KARIMEN

## 2021-05-24 ENCOUNTER — CARE COORDINATION (OUTPATIENT)
Dept: CASE MANAGEMENT | Age: 86
End: 2021-05-24

## 2021-05-24 NOTE — CARE COORDINATION
[facility] Transitions Follow Up Call    2021    Patient: Rayna Boswell  Patient : 1932   MRN: <[id number]>  Reason for Admission:   Discharge Date: 21 RARS: Readmission Risk Score: 12    Attempted to contact patient for BPCI-A follow up. Unable to reach patient. Left message with contact information and request for call back.       Follow Up  Future Appointments   Date Time Provider Mike Pinedo   2021 11:30 AM Joana Javier MD [address] [place] [address] [place], SONAL

## 2021-05-28 ENCOUNTER — CARE COORDINATION (OUTPATIENT)
Dept: CASE MANAGEMENT | Age: 86
End: 2021-05-28

## 2021-05-28 NOTE — CARE COORDINATION
[facility] Transitions Initial Follow Up Call    Call within 2 business days of discharge: Yes    Patient: Jared Boswell Patient : 1932   MRN: <[id number]>  Reason for Admission:   Discharge Date: 21 RARS: Readmission Risk Score: 12      Last Discharge [address]       Complaint Diagnosis Description Type Department Provider    21 Shortness of Breath Acute respiratory failure with hypoxia [facility]) ED to Hosp-Admission (Discharged) (ADMITTED) JARVIS ZamoraS Jorge Rueda MD; Anirudh Roblero. .. Spoke with: Patient's spouse      Transitions of Care Initial Call    Was this an external facility discharge? No Discharge Facility: [address] [place] [address] Road to be reviewed by the provider   Additional needs identified to be addressed with provider Yes  home health care-requesting referral             Method of communication with provider : phone      Advance Care Planning:   Does patient have an Advance Directive:  not on file. Was this a readmission? Yes  Patient stated reason for admission:  SOB, decreased appetite, productive cough  Patients top risk factors for readmission: functional physical ability and medical condition-respiratory failure    Care Transition Nurse (CTN) contacted the family by telephone to perform post hospital discharge assessment. Verified name and  with family as identifiers. Provided introduction to self, and explanation of the CTN role. CTN reviewed discharge instructions, medical action plan and red flags with family who verbalized understanding. Family given an opportunity to ask questions and does not have any further questions or concerns at this time. Were discharge instructions available to patient? Yes. Reviewed appropriate site of care based on symptoms and resources available to patient including: PCP. The family agrees to contact the PCP office for questions related to their healthcare.      Medication reconciliation was performed with family, who verbalizes understanding of administration of home medications. Advised obtaining a 90-day supply of all daily and as-needed medications. Covid Risk Education     Educated patient about risk for severe COVID-19 due to risk factors according to CDC guidelines. CTN reviewed discharge instructions, medical action plan and red flag symptoms family who verbalized understanding. Discussed COVID vaccination status Yes. Education provided on COVID-19 vaccination as appropriate. Discussed exposure protocols and quarantine with CDC Guidelines. Family was given an opportunity to verbalize any questions and concerns and agrees to contact CTN or health care provider for questions related to their healthcare. Reviewed and educated family on any new and changed medications related to discharge diagnosis     Was patient discharged with a pulse oximeter? No   CTN provided contact information. Plan for follow-up call in 3-5 days based on severity of symptoms and risk factors. Non-face-to-face services provided:  Contacted PCP office for referral for home health    Contacted patient for initial transition follow up. Spoke with patient's spouse. She stated that Iris Sarabia is sleeping now. Reports he saw PCP Rajinder Cam for follow up on Wednesday. She reports blood work was done. Patient was told that he still has PNA. She stated that he has not gotten any better. She denies patient having any c/o chest pain/discomfort, fever or chills although he had a low grade temperature the other day. She stated he does not have a temperature today. He is coughing but she stated he is not coughing a lot. Continues to become short of breath at rest and with exertion. He is not of oxygen. They do not have a pulse oximeter. She denies Iris Sarabia being in any distress. Discussed signs/symptoms and when to contact his provider with any new or worsening symptoms and when to report to the ER.   She verbalized

## 2021-06-01 ENCOUNTER — TELEPHONE (OUTPATIENT)
Dept: ADMINISTRATIVE | Age: 86
End: 2021-06-01

## 2021-06-01 NOTE — TELEPHONE ENCOUNTER
Patient's wife would like him to establish with Dr Concepcion Iyer. I read pain policy. Will you take him on as a new patient? Please advise.

## 2021-06-02 ENCOUNTER — CARE COORDINATION (OUTPATIENT)
Dept: CASE MANAGEMENT | Age: 86
End: 2021-06-02

## 2021-06-02 ENCOUNTER — TELEPHONE (OUTPATIENT)
Dept: ADMINISTRATIVE | Age: 86
End: 2021-06-02

## 2021-06-02 NOTE — TELEPHONE ENCOUNTER
Patient is scheduled as a new pt with Dr Quirino Unger. Hs wife is a pt of Dr Quirino Unger. I made him appt for June 29. He had covid 19 on May 2. He had pneumonia. Wife is concerned that he sleeps more than usual and when he breaths has a raspy sound. He was checked by his pcp. She wants him to see Dr Quirino Unger which he agree to see him as a new pt  But would like to have a sooner appt.   I added him to wait list.

## 2021-06-02 NOTE — CARE COORDINATION
[facility] Transitions Follow Up Call    2021    Patient: Deidre Boswell  Patient : 1932   MRN: <[id number]>  Reason for Admission:   Discharge Date: 21 RARS: Readmission Risk Score: 12       Attempted to contact patient for BPCI-A follow up. Unable to reach patient. Phone line busy. Attempted to call back a second time. Phone line rang multiple times. No answer. Will try again at a later time. CTN contacted [facility] to verify referral for home health services. Spoke with intake. Referral has not been received. CTN was informed that they are backed up and all new referrals are being scheduled for next week. CTN attempted to contact PCP office. Recording stated the office is closed for lunch between 12-1 pm.  CTN will attempt to call office back after 1 pm to verify referral for home health services was faxed.      Follow Up  Future Appointments   Date Time Provider Mike Pinedo   2021 11:30 AM [facility]   2021  2:00 PM DO MONTSE [facility]       Ofelia Welch RN

## 2021-06-02 NOTE — TELEPHONE ENCOUNTER
I cannot see him any sooner. I am already booked up and will be going on vacation in another week.  If he is having problems, he should see his current physician

## 2021-06-02 NOTE — CARE COORDINATION
Contacted PCP office. CTN was informed that referral was faxed to [facility]. CTN will follow up with home health agency. CTN contacted [facility]. Spoke with [employer] [facility] She confirmed referral was received and is scheduled to be seen tomorrow. She stated someone will be contacting patient today to let him know.       Puja Isbell, RN  Care Transition Nurse

## 2021-06-04 ENCOUNTER — CARE COORDINATION (OUTPATIENT)
Dept: CASE MANAGEMENT | Age: 86
End: 2021-06-04

## 2021-06-04 NOTE — CARE COORDINATION
[facility] Transitions Follow Up Call    2021    Patient: Felix Boswell  Patient : 1932   MRN: <[id number]>  Reason for Admission:   Discharge Date: 21 RARS: Readmission Risk Score: 12         Spoke with: Gertrudis Mayfield, patient    Contacted patient for BPCI-A follow up. Mr. Galilea Knowles stated that he is about 80% back to himself. Still having weakness. Denies having any shortness of breath. Reports he had right sided chest pain but no longer having any pain. No c/o cough, fever, chills. Stated the home health nurse made a visit yesterday and told him everything looks fine. He stated he will be starting PT next week. Appetite is still poor. He is drinking Boost.  Encouraged to stay hydrated. Briefly discussed when to contact his doctor with any new or worsening symptoms. He verbalized understanding. No needs or concerns at this time. Care Transitions Subsequent and Final Call    Subsequent and Final Calls  Do you have any ongoing symptoms?: Yes  Patient-reported symptoms: Weakness  Do you currently have any active services?: Yes  Are you currently active with any services?: Home Health  Do you have any needs or concerns that I can assist you with?: No  Identified Barriers: None  Care Transitions Interventions  Other Interventions:            Follow Up  Future Appointments   Date Time Provider Mike Pinedo   2021 11:30 AM Roxana De La Cruz MD [facility]   2021  2:00 PM DO MONTSE [facility]       Puja Isbell RN

## 2021-06-10 ENCOUNTER — CARE COORDINATION (OUTPATIENT)
Dept: CASE MANAGEMENT | Age: 86
End: 2021-06-10

## 2021-06-14 ENCOUNTER — OFFICE VISIT (OUTPATIENT)
Dept: CARDIOLOGY CLINIC | Age: 86
End: 2021-06-14
Payer: MEDICARE

## 2021-06-14 VITALS
SYSTOLIC BLOOD PRESSURE: 90 MMHG | BODY MASS INDEX: 28.93 KG/M2 | HEIGHT: 66 IN | DIASTOLIC BLOOD PRESSURE: 50 MMHG | HEART RATE: 70 BPM | RESPIRATION RATE: 20 BRPM | WEIGHT: 180 LBS

## 2021-06-14 DIAGNOSIS — R55 SYNCOPE AND COLLAPSE: Primary | ICD-10-CM

## 2021-06-14 DIAGNOSIS — U07.1 ACUTE RESPIRATORY FAILURE DUE TO COVID-19 (HCC): ICD-10-CM

## 2021-06-14 DIAGNOSIS — E78.00 PURE HYPERCHOLESTEROLEMIA: ICD-10-CM

## 2021-06-14 DIAGNOSIS — J96.00 ACUTE RESPIRATORY FAILURE DUE TO COVID-19 (HCC): ICD-10-CM

## 2021-06-14 PROCEDURE — 1036F TOBACCO NON-USER: CPT | Performed by: INTERNAL MEDICINE

## 2021-06-14 PROCEDURE — G8427 DOCREV CUR MEDS BY ELIG CLIN: HCPCS | Performed by: INTERNAL MEDICINE

## 2021-06-14 PROCEDURE — 1123F ACP DISCUSS/DSCN MKR DOCD: CPT | Performed by: INTERNAL MEDICINE

## 2021-06-14 PROCEDURE — 93000 ELECTROCARDIOGRAM COMPLETE: CPT | Performed by: INTERNAL MEDICINE

## 2021-06-14 PROCEDURE — 1111F DSCHRG MED/CURRENT MED MERGE: CPT | Performed by: INTERNAL MEDICINE

## 2021-06-14 PROCEDURE — 4040F PNEUMOC VAC/ADMIN/RCVD: CPT | Performed by: INTERNAL MEDICINE

## 2021-06-14 PROCEDURE — G8417 CALC BMI ABV UP PARAM F/U: HCPCS | Performed by: INTERNAL MEDICINE

## 2021-06-14 PROCEDURE — 99214 OFFICE O/P EST MOD 30 MIN: CPT | Performed by: INTERNAL MEDICINE

## 2021-06-14 NOTE — PROGRESS NOTES
used   Substance and Sexual Activity    Alcohol use: No    Drug use: No    Sexual activity: Not on file   Other Topics Concern    Not on file   Social History Narrative    Not on file     Social Determinants of Health     Financial Resource Strain:     Difficulty of Paying Living Expenses:    Food Insecurity:     Worried About Running Out of Food in the Last Year:     [address] in the Last Year:    Transportation Needs:     Lack of Transportation (Medical):  Lack of Transportation (Non-Medical):    Physical Activity:     Days of Exercise per Week:     Minutes of Exercise per Session:    Stress:     Feeling of Stress :    Social Connections:     Frequency of Communication with Friends and Family:     Frequency of Social Gatherings with Friends and Family:     Attends Religion Services:     Active Member of Clubs or Organizations:     Attends Club or Organization Meetings:     Marital Status:    Intimate Partner Violence:     Fear of Current or Ex-Partner:     Emotionally Abused:     Physically Abused:     Sexually Abused: Allergies:  No Known Allergies    Current Medications:  Current Outpatient Medications   Medication Sig Dispense Refill    Vitamin D (CHOLECALCIFEROL) 50 MCG (2000 UT) TABS tablet Take 1 tablet by mouth daily 60 tablet 0    atorvastatin (LIPITOR) 10 MG tablet Take 10 mg by mouth every other day NIGHTLY        No current facility-administered medications for this visit. Physical Exam:  BP (!) 90/50 (Site: Left Upper Arm, Position: Sitting)   Pulse 70   Resp 20   Ht 5' 6\" (1.676 m)   Wt 180 lb (81.6 kg)   BMI 29.05 kg/m²   Wt Readings from Last 3 Encounters:   06/14/21 180 lb (81.6 kg)   05/14/21 183 lb (83 kg)   04/17/21 191 lb 6.4 oz (86.8 kg)     The patient is awake, alert and in no discomfort or distress. No gross musculoskeletal deformity is present. No significant skin or nail changes are present.  Gross examination of head, eyes, nose and throat are negative. Jugular venous pressure is normal and no carotid bruits are present. Normal respiratory effort is noted with no accessory muscle usage present. Lung fields are clear to ascultation. Cardiac examination is notable for a regular rate and rhythm with no palpable thrill. No gallop rhythm or cardiac murmur are identified. A benign abdominal examination is present with no masses or organomegaly. Intact pulses are present throughout all extremities and no peripheral edema is present. No focal neurologic deficits are present. Laboratory Tests:  Lab Results   Component Value Date    CREATININE 0.9 05/15/2021    BUN 15 05/15/2021     05/15/2021    K 3.9 05/15/2021     05/15/2021    CO2 23 05/15/2021     No results found for: BNP  Lab Results   Component Value Date    WBC 2.2 05/15/2021    RBC 4.42 05/15/2021    HGB 13.9 05/15/2021    HCT 40.3 05/15/2021    MCV 91.2 05/15/2021    MCH 31.4 05/15/2021    MCHC 34.5 05/15/2021    RDW 12.5 05/15/2021     05/15/2021    MPV 10.1 05/15/2021     No results for input(s): ALKPHOS, ALT, AST, PROT, BILITOT, BILIDIR, LABALBU in the last 72 hours. 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   I have extensively discussed this with he and his wife and have not recommended alteration of therapy or needs of additional assessment. He continues to recover from his COVID-19 infection without additional cardiovascular complications having not regained his normal functional capabilities. Presently, I will predominately return him to your care with ongoing recommendations of appropriate lifestyle modification to assist gradual weight reduction as well as that of aggressive risk factor modification of blood pressure and serum lipids and attempt to reduce risk of future atherosclerotic development. I would happily reassess him in the future should additional cardiovascular difficulties or concerns arise. The patient's current medication list, allergies, problem list and results of all previously ordered testing were reviewed at today's visit. Follow-up office visit as needed should additional cardiovascular difficulties or concerns arise      Note: This report was completed using computerized voice recognition software. Every effort has been made to ensure accuracy, however; inadvertent computerized transcription errors may be present. Debra Simons, [address] [place] [address] Cardiology    An electronic copy of this follow-up progress note was forwarded to Dr. Fior Mar

## 2021-06-15 ENCOUNTER — CARE COORDINATION (OUTPATIENT)
Dept: CASE MANAGEMENT | Age: 86
End: 2021-06-15

## 2021-06-15 NOTE — CARE COORDINATION
[facility] Transitions Follow Up Call    6/15/2021    Patient: Nael Roe  Patient : 1932   MRN: [id number]  Reason for Admission:   Discharge Date: 21 RARS: Readmission Risk Score: 12         Spoke with: Patient, Charmaine Peralta Transitions Subsequent and Final Call    Subsequent and Final Calls  Do you have any ongoing symptoms?:  \"slight\" cough, expectorating clear mucus  Do you have any questions related to your medications?: No  Do you currently have any active services?: Yes  Are you currently active with any services?: Home Health  -services continue; had PT visit this am  Do you have any needs or concerns that I can assist you with?: No  Identified Barriers: None  Care Transitions Interventions  No Identified Needs    Patient is pleasant in conversation and voices no needs or concerns at this time; reports \"feeling pretty good\". -denies, fever, chills, shortness of breath, or chest discomfort  -Patient reports he has a f/u appointment today with NP at former PCP office; patient to notify NP he is establishing with Dr. Thomas Morocho. -reports following portion control with diet; reports weight loss  -normal bladder/bowel elimination   -taking prescribed medications as ordered    Emotional support provided; discussed will continue to follow.            Follow Up  Future Appointments   Date Time Provider Mike Pinedo   2021  2:00 PM DO MONTSE [facility]       Bernard Mejia RN

## 2021-06-29 ENCOUNTER — OFFICE VISIT (OUTPATIENT)
Dept: PRIMARY CARE CLINIC | Age: 86
End: 2021-06-29
Payer: MEDICARE

## 2021-06-29 VITALS
HEART RATE: 80 BPM | OXYGEN SATURATION: 96 % | WEIGHT: 184 LBS | SYSTOLIC BLOOD PRESSURE: 128 MMHG | HEIGHT: 66 IN | BODY MASS INDEX: 29.57 KG/M2 | RESPIRATION RATE: 16 BRPM | DIASTOLIC BLOOD PRESSURE: 68 MMHG

## 2021-06-29 DIAGNOSIS — Z91.81 AT HIGH RISK FOR FALLS: Primary | ICD-10-CM

## 2021-06-29 DIAGNOSIS — R55 SYNCOPE AND COLLAPSE: ICD-10-CM

## 2021-06-29 DIAGNOSIS — E78.49 OTHER HYPERLIPIDEMIA: ICD-10-CM

## 2021-06-29 DIAGNOSIS — S09.90XD INJURY OF HEAD, SUBSEQUENT ENCOUNTER: ICD-10-CM

## 2021-06-29 PROBLEM — U07.1 ACUTE RESPIRATORY FAILURE DUE TO SEVERE ACUTE RESPIRATORY SYNDROME CORONAVIRUS 2 (SARS-COV-2) INFECTION (HCC): Status: RESOLVED | Noted: 2021-05-14 | Resolved: 2021-06-29

## 2021-06-29 PROBLEM — J96.00 ACUTE RESPIRATORY FAILURE DUE TO COVID-19 (HCC): Status: RESOLVED | Noted: 2021-05-14 | Resolved: 2021-06-29

## 2021-06-29 PROBLEM — U07.1 ACUTE RESPIRATORY FAILURE DUE TO COVID-19 (HCC): Status: RESOLVED | Noted: 2021-05-14 | Resolved: 2021-06-29

## 2021-06-29 PROBLEM — J96.00 ACUTE RESPIRATORY FAILURE DUE TO SEVERE ACUTE RESPIRATORY SYNDROME CORONAVIRUS 2 (SARS-COV-2) INFECTION (HCC): Status: RESOLVED | Noted: 2021-05-14 | Resolved: 2021-06-29

## 2021-06-29 PROBLEM — E78.00 PURE HYPERCHOLESTEROLEMIA: Status: RESOLVED | Noted: 2021-06-14 | Resolved: 2021-06-29

## 2021-06-29 LAB
ALBUMIN SERPL-MCNC: 3.8 G/DL (ref 3.5–5.2)
ALP BLD-CCNC: 95 U/L (ref 40–129)
ALT SERPL-CCNC: 16 U/L (ref 0–40)
ANION GAP SERPL CALCULATED.3IONS-SCNC: 12 MMOL/L (ref 7–16)
AST SERPL-CCNC: 22 U/L (ref 0–39)
BILIRUB SERPL-MCNC: 0.8 MG/DL (ref 0–1.2)
BUN BLDV-MCNC: 14 MG/DL (ref 6–23)
CALCIUM SERPL-MCNC: 9.7 MG/DL (ref 8.6–10.2)
CHLORIDE BLD-SCNC: 106 MMOL/L (ref 98–107)
CHOLESTEROL, TOTAL: 181 MG/DL (ref 0–199)
CO2: 27 MMOL/L (ref 22–29)
CREAT SERPL-MCNC: 1.1 MG/DL (ref 0.7–1.2)
GFR AFRICAN AMERICAN: >60
GFR NON-AFRICAN AMERICAN: >60 ML/MIN/1.73
GLUCOSE BLD-MCNC: 98 MG/DL (ref 74–99)
HDLC SERPL-MCNC: 53 MG/DL
LDL CHOLESTEROL CALCULATED: 103 MG/DL (ref 0–99)
POTASSIUM SERPL-SCNC: 4.7 MMOL/L (ref 3.5–5)
SODIUM BLD-SCNC: 145 MMOL/L (ref 132–146)
TOTAL PROTEIN: 6.9 G/DL (ref 6.4–8.3)
TRIGL SERPL-MCNC: 123 MG/DL (ref 0–149)
VLDLC SERPL CALC-MCNC: 25 MG/DL

## 2021-06-29 PROCEDURE — G8417 CALC BMI ABV UP PARAM F/U: HCPCS | Performed by: FAMILY MEDICINE

## 2021-06-29 PROCEDURE — G8427 DOCREV CUR MEDS BY ELIG CLIN: HCPCS | Performed by: FAMILY MEDICINE

## 2021-06-29 PROCEDURE — 99203 OFFICE O/P NEW LOW 30 MIN: CPT | Performed by: FAMILY MEDICINE

## 2021-06-29 PROCEDURE — 1036F TOBACCO NON-USER: CPT | Performed by: FAMILY MEDICINE

## 2021-06-29 PROCEDURE — 1123F ACP DISCUSS/DSCN MKR DOCD: CPT | Performed by: FAMILY MEDICINE

## 2021-06-29 PROCEDURE — 4040F PNEUMOC VAC/ADMIN/RCVD: CPT | Performed by: FAMILY MEDICINE

## 2021-06-29 ASSESSMENT — PATIENT HEALTH QUESTIONNAIRE - PHQ9
SUM OF ALL RESPONSES TO PHQ9 QUESTIONS 1 & 2: 0
2. FEELING DOWN, DEPRESSED OR HOPELESS: 0
SUM OF ALL RESPONSES TO PHQ QUESTIONS 1-9: 0
SUM OF ALL RESPONSES TO PHQ QUESTIONS 1-9: 0
1. LITTLE INTEREST OR PLEASURE IN DOING THINGS: 0
SUM OF ALL RESPONSES TO PHQ QUESTIONS 1-9: 0

## 2021-06-29 ASSESSMENT — ENCOUNTER SYMPTOMS
APNEA: 0
COUGH: 0
SORE THROAT: 0
DIARRHEA: 0
EYE REDNESS: 0
CONSTIPATION: 0
EYE ITCHING: 0
NAUSEA: 0
BOWEL INCONTINENCE: 0
SHORTNESS OF BREATH: 0
EYE PAIN: 0
WHEEZING: 0
COLOR CHANGE: 0
CHEST TIGHTNESS: 0
BACK PAIN: 0
SINUS PRESSURE: 0
VOMITING: 0
RHINORRHEA: 0
ABDOMINAL PAIN: 0
BLOOD IN STOOL: 0

## 2021-06-29 NOTE — PROGRESS NOTES
Chief Complaint:     Chief Complaint   Patient presents with    Established New Doctor    Fall     had a fall about 2 months ago and went to hospital.    Hyperlipidemia         Fall  The accident occurred more than 1 week ago. The fall occurred while walking. He landed on hard floor. The point of impact was the head. The pain is present in the head. The patient is experiencing no pain. Pertinent negatives include no abdominal pain, bowel incontinence, fever, headaches, hearing loss, hematuria, loss of consciousness, nausea, numbness or vomiting. He has tried nothing for the symptoms. The treatment provided no relief. Hyperlipidemia  This is a chronic problem. The current episode started more than 1 year ago. The problem is controlled. Recent lipid tests were reviewed and are normal. He has no history of diabetes, hypothyroidism or obesity. There are no known factors aggravating his hyperlipidemia. Pertinent negatives include no chest pain, myalgias or shortness of breath. Current antihyperlipidemic treatment includes statins. The current treatment provides significant improvement of lipids. There are no compliance problems. Risk factors for coronary artery disease include dyslipidemia and male sex. Loss of Consciousness  This is a new problem. The current episode started 1 to 4 weeks ago. The problem has been resolved. Nothing aggravates the symptoms. Pertinent negatives include no abdominal pain, back pain, bowel incontinence, chest pain, dizziness, fever, headaches, light-headedness, nausea, palpitations, vomiting or weakness. He has tried nothing for the symptoms. The treatment provided significant relief. There is no history of arrhythmia, CAD, a clotting disorder or CVA.        Patient Active Problem List   Diagnosis    Syncope and collapse    AV block, Mobitz II    Other hyperlipidemia    Head injury       Past Medical History:   Diagnosis Date    Cancer ([facility] [address] 1996    PROSTATE    Hyperlipidemia  Urethral stricture        Past Surgical History:   Procedure Laterality Date    CYSTOSCOPY  1996    SEED IMPLANTATION    CYSTOSCOPY  07/10/2012    retrograde pyelograms urethral dilatation       Current Outpatient Medications   Medication Sig Dispense Refill    Vitamin D (CHOLECALCIFEROL) 50 MCG (2000 UT) TABS tablet Take 1 tablet by mouth daily 60 tablet 0    atorvastatin (LIPITOR) 10 MG tablet Take 10 mg by mouth every other day NIGHTLY        No current facility-administered medications for this visit. No Known Allergies    Social History     Socioeconomic History    Marital status:      Spouse name: None    Number of children: None    Years of education: None    Highest education level: None   Occupational History    None   Tobacco Use    Smoking status: Never Smoker    Smokeless tobacco: Never Used   Vaping Use    Vaping Use: Never used   Substance and Sexual Activity    Alcohol use: No    Drug use: No    Sexual activity: None   Other Topics Concern    None   Social History Narrative    None     Social Determinants of Health     Financial Resource Strain:     Difficulty of Paying Living Expenses:    Food Insecurity:     Worried About Running Out of Food in the Last Year:     Ran Out of Food in the Last Year:    Transportation Needs:     Lack of Transportation (Medical):      Lack of Transportation (Non-Medical):    Physical Activity:     Days of Exercise per Week:     Minutes of Exercise per Session:    Stress:     Feeling of Stress :    Social Connections:     Frequency of Communication with Friends and Family:     Frequency of Social Gatherings with Friends and Family:     Attends Anglican Services:     Active Member of Clubs or Organizations:     Attends Club or Organization Meetings:     Marital Status:    Intimate Partner Violence:     Fear of Current or Ex-Partner:     Emotionally Abused:     Physically Abused:     Sexually Abused:        History reviewed. No pertinent family history. Review of Systems   Constitutional: Negative for activity change, appetite change, fatigue and fever. HENT: Negative for congestion, ear pain, hearing loss, nosebleeds, rhinorrhea, sinus pressure and sore throat. Eyes: Negative for pain, redness, itching and visual disturbance. Respiratory: Negative for apnea, cough, chest tightness, shortness of breath and wheezing. Cardiovascular: Positive for syncope. Negative for chest pain, palpitations and leg swelling. Gastrointestinal: Negative for abdominal pain, blood in stool, bowel incontinence, constipation, diarrhea, nausea and vomiting. Endocrine: Negative. Genitourinary: Negative for decreased urine volume, difficulty urinating, dysuria, frequency, hematuria and urgency. Musculoskeletal: Negative for arthralgias, back pain, gait problem, myalgias and neck pain. Skin: Negative for color change and rash. Allergic/Immunologic: Negative for environmental allergies and food allergies. Neurological: Negative for dizziness, loss of consciousness, weakness, light-headedness, numbness and headaches. Hematological: Negative for adenopathy. Does not bruise/bleed easily. Psychiatric/Behavioral: Negative for behavioral problems, dysphoric mood and sleep disturbance. The patient is not nervous/anxious and is not hyperactive. All other systems reviewed and are negative. /68   Pulse 80   Resp 16   Ht 5' 6\" (1.676 m)   Wt 184 lb (83.5 kg)   SpO2 96%   BMI 29.70 kg/m²     Physical Exam  Vitals and nursing note reviewed. Constitutional:       General: He is not in acute distress. Appearance: Normal appearance. He is well-developed. HENT:      Head: Normocephalic and atraumatic. Right Ear: Hearing, tympanic membrane and external ear normal. No tenderness. No middle ear effusion. Left Ear: Hearing, tympanic membrane and external ear normal. No tenderness.   No middle ear effusion. Nose: Nose normal. No congestion or rhinorrhea. Right Turbinates: Not enlarged. Left Turbinates: Not enlarged. Mouth/Throat:      Mouth: Mucous membranes are moist.      Tongue: No lesions. Pharynx: Oropharynx is clear. No oropharyngeal exudate or posterior oropharyngeal erythema. Eyes:      General: No scleral icterus. Conjunctiva/sclera: Conjunctivae normal.      Pupils: Pupils are equal, round, and reactive to light. Neck:      Thyroid: No thyromegaly. Cardiovascular:      Rate and Rhythm: Normal rate and regular rhythm. Heart sounds: Normal heart sounds. No murmur heard. Pulmonary:      Effort: Pulmonary effort is normal. No respiratory distress. Breath sounds: Normal breath sounds. No wheezing or rales. Abdominal:      General: Bowel sounds are normal. There is no distension. Palpations: Abdomen is soft. Tenderness: There is no abdominal tenderness. Musculoskeletal:         General: No tenderness. Normal range of motion. Cervical back: Normal range of motion and neck supple. No rigidity. No muscular tenderness. Lymphadenopathy:      Cervical: No cervical adenopathy. Skin:     General: Skin is warm and dry. Findings: No erythema or rash. Neurological:      General: No focal deficit present. Mental Status: He is alert and oriented to person, place, and time. Cranial Nerves: No cranial nerve deficit. Deep Tendon Reflexes: Reflexes are normal and symmetric. Reflexes normal.   Psychiatric:         Mood and Affect: Mood normal.                                 ASSESSMENT/PLAN:    Patient Active Problem List   Diagnosis    Syncope and collapse    AV block, Mobitz II    Other hyperlipidemia    Head injury       Will Marion was seen today for established new doctor, fall and hyperlipidemia.     Diagnoses and all orders for this visit:    At high risk for falls    Syncope and collapse    Other hyperlipidemia  -

## 2021-06-30 ENCOUNTER — CARE COORDINATION (OUTPATIENT)
Dept: CASE MANAGEMENT | Age: 86
End: 2021-06-30

## 2021-06-30 NOTE — CARE COORDINATION
[facility] Transitions Follow Up Call    2021    Patient: Esperanza Boswell  Patient : 1932   MRN: <[id number]>  Reason for Admission:   Discharge Date: 21 RARS: Readmission Risk Score: 12         Spoke with: Darryle Parry, patient    Contacted patient for BPCI-A follow up. Mr. Leonel Ram stated that he is doing pretty good now. Reports he continues to receive therapy and next week will be the last visit. He denies having any recent falls or syncopal episodes. No c/o chest pain/discomfort, shortness of breath, fever, chills. Reports he has an occasional cough with clear sputum. He stated he saw his new PCP yesterday. He is eating and staying hydrated. Discussed when to contact his doctor with any new or worsening symptoms. He verbalized understanding. He has all of his medications. No needs or concerns at this time. Care Transitions Subsequent and Final Call    Subsequent and Final Calls  Do you have any ongoing symptoms?: No  Have your medications changed?: No  Do you have any questions related to your medications?: No  Do you currently have any active services?: Yes  Are you currently active with any services?: Home Health  Do you have any needs or concerns that I can assist you with?: No  Care Transitions Interventions  Other Interventions:            Follow Up  Future Appointments   Date Time Provider Mike Pinedo   2021  1:30 PM DO [facility] [facility]       Ani Gann RN

## 2021-07-09 ENCOUNTER — CARE COORDINATION (OUTPATIENT)
Dept: CASE MANAGEMENT | Age: 86
End: 2021-07-09

## 2021-07-09 NOTE — CARE COORDINATION
[facility] Transitions Follow Up Call    2021    Patient: Siddharth Boswell  Patient : 1932   MRN: <[id number]>  Reason for Admission:   Discharge Date: 21 RARS: Readmission Risk Score: 12    Attempted to contact patient for BPCI-A follow up. Unable to reach patient. Call was picked up and disconnected. Attempted to call back and phone line was busy. Will try again at a later time.       Follow Up  Future Appointments   Date Time Provider Mike Pinedo   2021  1:30 PM [address], DO [address] [place] [facility]       Massimo Rodriguez RN

## 2021-07-16 ENCOUNTER — CARE COORDINATION (OUTPATIENT)
Dept: CASE MANAGEMENT | Age: 86
End: 2021-07-16

## 2021-07-16 NOTE — CARE COORDINATION
[facility] Transitions Follow Up Call    2021    Patient: Warden Elayne Boswell  Patient : 1932   MRN: <[id number]>  Reason for Admission:   Discharge Date: 21 RARS: Readmission Risk Score: 12    Attempted to contact patient for final BPCI-A follow up. Unable to reach patient. Left message with contact information and request for call back. BPCI-A bundle ending. CTN signing off.         Follow Up  Future Appointments   Date Time Provider Mike Pinedo   2021  1:30 PM DO [facility] [facility]       Carlo Danielle, RN

## 2021-09-30 ENCOUNTER — OFFICE VISIT (OUTPATIENT)
Dept: PRIMARY CARE CLINIC | Age: 86
End: 2021-09-30
Payer: MEDICARE

## 2021-09-30 VITALS
TEMPERATURE: 97.9 F | HEART RATE: 87 BPM | WEIGHT: 182 LBS | OXYGEN SATURATION: 97 % | DIASTOLIC BLOOD PRESSURE: 68 MMHG | BODY MASS INDEX: 29.38 KG/M2 | SYSTOLIC BLOOD PRESSURE: 114 MMHG

## 2021-09-30 DIAGNOSIS — Z23 NEED FOR PROPHYLACTIC VACCINATION AGAINST STREPTOCOCCUS PNEUMONIAE (PNEUMOCOCCUS): ICD-10-CM

## 2021-09-30 DIAGNOSIS — Z00.00 ROUTINE GENERAL MEDICAL EXAMINATION AT A HEALTH CARE FACILITY: Primary | ICD-10-CM

## 2021-09-30 PROCEDURE — G0008 ADMIN INFLUENZA VIRUS VAC: HCPCS | Performed by: FAMILY MEDICINE

## 2021-09-30 PROCEDURE — 1123F ACP DISCUSS/DSCN MKR DOCD: CPT | Performed by: FAMILY MEDICINE

## 2021-09-30 PROCEDURE — G0438 PPPS, INITIAL VISIT: HCPCS | Performed by: FAMILY MEDICINE

## 2021-09-30 PROCEDURE — 90732 PPSV23 VACC 2 YRS+ SUBQ/IM: CPT | Performed by: FAMILY MEDICINE

## 2021-09-30 PROCEDURE — 4040F PNEUMOC VAC/ADMIN/RCVD: CPT | Performed by: FAMILY MEDICINE

## 2021-09-30 PROCEDURE — G0009 ADMIN PNEUMOCOCCAL VACCINE: HCPCS | Performed by: FAMILY MEDICINE

## 2021-09-30 PROCEDURE — 90694 VACC AIIV4 NO PRSRV 0.5ML IM: CPT | Performed by: FAMILY MEDICINE

## 2021-09-30 RX ORDER — ATORVASTATIN CALCIUM 10 MG/1
10 TABLET, FILM COATED ORAL EVERY OTHER DAY
Qty: 90 TABLET | Refills: 1 | Status: SHIPPED
Start: 2021-09-30 | End: 2022-10-14 | Stop reason: SDUPTHER

## 2021-09-30 ASSESSMENT — LIFESTYLE VARIABLES
HAS A RELATIVE, FRIEND, DOCTOR, OR ANOTHER HEALTH PROFESSIONAL EXPRESSED CONCERN ABOUT YOUR DRINKING OR SUGGESTED YOU CUT DOWN: 0
HOW OFTEN DO YOU HAVE SIX OR MORE DRINKS ON ONE OCCASION: NEVER
AUDIT TOTAL SCORE: 3
AUDIT TOTAL SCORE: 0
HAS A RELATIVE, FRIEND, DOCTOR, OR ANOTHER HEALTH PROFESSIONAL EXPRESSED CONCERN ABOUT YOUR DRINKING OR SUGGESTED YOU CUT DOWN: NO
HOW OFTEN DURING THE LAST YEAR HAVE YOU FOUND THAT YOU WERE NOT ABLE TO STOP DRINKING ONCE YOU HAD STARTED: 0
HOW OFTEN DO YOU HAVE SIX OR MORE DRINKS ON ONE OCCASION: 0
HOW OFTEN DO YOU HAVE A DRINK CONTAINING ALCOHOL: 3
HOW MANY STANDARD DRINKS CONTAINING ALCOHOL DO YOU HAVE ON A TYPICAL DAY: ONE OR TWO
AUDIT-C TOTAL SCORE: 3
HOW OFTEN DO YOU HAVE A DRINK CONTAINING ALCOHOL: TWO TO THREE TIMES A WEEK
HOW OFTEN DURING THE LAST YEAR HAVE YOU NEEDED AN ALCOHOLIC DRINK FIRST THING IN THE MORNING TO GET YOURSELF GOING AFTER A NIGHT OF HEAVY DRINKING: 0
HOW OFTEN DURING THE LAST YEAR HAVE YOU BEEN UNABLE TO REMEMBER WHAT HAPPENED THE NIGHT BEFORE BECAUSE YOU HAD BEEN DRINKING: 0
HOW OFTEN DURING THE LAST YEAR HAVE YOU FAILED TO DO WHAT WAS NORMALLY EXPECTED FROM YOU BECAUSE OF DRINKING: NEVER
HOW OFTEN DURING THE LAST YEAR HAVE YOU BEEN UNABLE TO REMEMBER WHAT HAPPENED THE NIGHT BEFORE BECAUSE YOU HAD BEEN DRINKING: NEVER
HAVE YOU OR SOMEONE ELSE BEEN INJURED AS A RESULT OF YOUR DRINKING: 0
AUDIT-C TOTAL SCORE: 0
HOW MANY STANDARD DRINKS CONTAINING ALCOHOL DO YOU HAVE ON A TYPICAL DAY: 0
HOW OFTEN DURING THE LAST YEAR HAVE YOU FOUND THAT YOU WERE NOT ABLE TO STOP DRINKING ONCE YOU HAD STARTED: NEVER
HOW OFTEN DURING THE LAST YEAR HAVE YOU FAILED TO DO WHAT WAS NORMALLY EXPECTED FROM YOU BECAUSE OF DRINKING: 0
HOW OFTEN DURING THE LAST YEAR HAVE YOU HAD A FEELING OF GUILT OR REMORSE AFTER DRINKING: NEVER
HOW OFTEN DURING THE LAST YEAR HAVE YOU HAD A FEELING OF GUILT OR REMORSE AFTER DRINKING: 0
HAVE YOU OR SOMEONE ELSE BEEN INJURED AS A RESULT OF YOUR DRINKING: NO
HOW OFTEN DURING THE LAST YEAR HAVE YOU NEEDED AN ALCOHOLIC DRINK FIRST THING IN THE MORNING TO GET YOURSELF GOING AFTER A NIGHT OF HEAVY DRINKING: NEVER

## 2021-09-30 ASSESSMENT — PATIENT HEALTH QUESTIONNAIRE - PHQ9
SUM OF ALL RESPONSES TO PHQ QUESTIONS 1-9: 0
SUM OF ALL RESPONSES TO PHQ9 QUESTIONS 1 & 2: 0
SUM OF ALL RESPONSES TO PHQ QUESTIONS 1-9: 0
2. FEELING DOWN, DEPRESSED OR HOPELESS: 0
1. LITTLE INTEREST OR PLEASURE IN DOING THINGS: 0
SUM OF ALL RESPONSES TO PHQ QUESTIONS 1-9: 0

## 2021-09-30 NOTE — PROGRESS NOTES
Medicare Annual Wellness Visit  Name: Gely Jackson Date: 2021   MRN: [id number] Sex: Male   Age: 80 y.o. Ethnicity: Non- / Non    : 1932 Race: White (non-)      Genevieve Stock is here for Medicare AWV    Screenings for behavioral, psychosocial and functional/safety risks, and cognitive dysfunction are all negative except as indicated below. These results, as well as other patient data from the [address] form, are documented in Flowsheets linked to this Encounter. No Known Allergies      Prior to Visit Medications    Medication Sig Taking? Authorizing Provider   atorvastatin (LIPITOR) 10 MG tablet Take 1 tablet by mouth every other day NIGHTLY Yes Daniel Appiah DO   Vitamin D (CHOLECALCIFEROL) 50 MCG (2000) TABS tablet Take 1 tablet by mouth daily Yes Smith Cunha MD         Past Medical History:   Diagnosis Date    Cancer [facility])     PROSTATE    Hyperlipidemia     Urethral stricture        Past Surgical History:   Procedure Laterality Date    CYSTOSCOPY      SEED IMPLANTATION    CYSTOSCOPY  07/10/2012    retrograde pyelograms urethral dilatation       No family history on file. CareTeam (Including outside providers/suppliers regularly involved in providing care):   Patient Care Team:  Deb Garibay DO as PCP - General (Family Medicine)  Deb Garibay DO as PCP - [facility] Empaneled Provider  Cristal Chiu MD as Consulting Physician (Family Medicine)    Wt Readings from Last 3 Encounters:   21 182 lb (82.6 kg)   21 184 lb (83.5 kg)   21 180 lb (81.6 kg)     Vitals:    21 1336   BP: 114/68   Pulse: 87   Temp: 97.9 °F (36.6 °C)   SpO2: 97%   Weight: 182 lb (82.6 kg)     Body mass index is 29.38 kg/m². Based upon direct observation of the patient, evaluation of cognition reveals recent and remote memory intact.     General Appearance: alert and oriented to person, place and time, well developed and well- nourished, in no acute distress  Skin: warm and dry, no rash or erythema  Head: normocephalic and atraumatic  Eyes: pupils equal, round, and reactive to light, extraocular eye movements intact, conjunctivae normal  ENT: tympanic membrane, external ear and ear canal normal bilaterally, nose without deformity, nasal mucosa and turbinates normal without polyps  Neck: supple and non-tender without mass, no thyromegaly or thyroid nodules, no cervical lymphadenopathy  Pulmonary/Chest: clear to auscultation bilaterally- no wheezes, rales or rhonchi, normal air movement, no respiratory distress  Cardiovascular: normal rate, regular rhythm, normal S1 and S2, no murmurs, rubs, clicks, or gallops, distal pulses intact, no carotid bruits  Abdomen: soft, non-tender, non-distended, normal bowel sounds, no masses or organomegaly  Extremities: no cyanosis, clubbing or edema  Musculoskeletal: normal range of motion, no joint swelling, deformity or tenderness  Neurologic: reflexes normal and symmetric, no cranial nerve deficit, gait, coordination and speech normal    Patient's complete Health Risk Assessment and screening values have been reviewed and are found in Flowsheets. The following problems were reviewed today and where indicated follow up appointments were made and/or referrals ordered. Positive Risk Factor Screenings with Interventions:            General Health and ACP:  General  In general, how would you say your health is?: Very Good  In the past 7 days, have you experienced any of the following?  New or Increased Pain, New or Increased Fatigue, Loneliness, Social Isolation, Stress or Anger?: None of These  Do you get the social and emotional support that you need?: Yes  Do you have a Living Will?: (!) No  Advance Directives     Power of [employer] Will ACP-Advance Directive ACP-Power of     Not on File Not on File Not on File Not on File      General Health Risk Interventions:  · No Living Will: Patient declines ACP discussion/assistance    Health Habits/Nutrition:  Health Habits/Nutrition  Do you exercise for at least 20 minutes 2-3 times per week?: Yes  Have you lost any weight without trying in the past 3 months?: No  Do you eat only one meal per day?: (!) Yes  Have you seen the dentist within the past year?: (!) No     Health Habits/Nutrition Interventions:  · Nutritional issues:  patient is not ready to address his/her nutritional/weight issues at this time  · Dental exam overdue:  patient encouraged to make appointment with his/her dentist    Hearing/Vision:  No exam data present  Hearing/Vision  Do you or your family notice any trouble with your hearing that hasn't been managed with hearing aids?: (!) Yes  Do you have difficulty driving, watching TV, or doing any of your daily activities because of your eyesight?: No  Have you had an eye exam within the past year?: Yes  Hearing/Vision Interventions:  · Hearing concerns:  patient declines any further evaluation/treatment for hearing issues    Safety:  Safety  Do you have working smoke detectors?: Yes  Have all throw rugs been removed or fastened?: (!) No  Do you have non-slip mats or surfaces in all bathtubs/showers?: (!) No  Do all of your stairways have a railing or banister?: Yes  Are your doorways, halls and stairs free of clutter?: Yes  Do you always fasten your seatbelt when you are in a car?: Yes  Safety Interventions:  · Home safety tips provided     Personalized Preventive Plan   Current Health Maintenance Status  Immunization History   Administered Date(s) Administered    Tdap (Boostrix, Adacel) 04/17/2021        Health Maintenance   Topic Date Due    COVID-19 Vaccine (1) Never done    Shingles Vaccine (1 of 2) Never done    Pneumococcal 65+ years Vaccine (1 of 1 - PPSV23) Never done   [employer] Visit (AWV)  Never done    Flu vaccine (1) 09/01/2021    Lipid screen  06/29/2022    DTaP/Tdap/Td vaccine (2 - Td or Tdap) 04/17/2031    Hepatitis A vaccine  Aged Out    Hepatitis B vaccine  Aged Out    Hib vaccine  Aged Out    Meningococcal (ACWY) vaccine  Aged Out     Recommendations for [employer] Due: see orders and patient instructions/AVS.  . Recommended screening schedule for the next 5-10 years is provided to the patient in written form: see Patient Karri Long was seen today for medicare awv. Diagnoses and all orders for this visit:    Routine general medical examination at a health care facility    Need for prophylactic vaccination against Streptococcus pneumoniae (pneumococcus)  -     Pneumococcal polysaccharide vaccine 23-valent PPSV23    Other orders  -     INFLUENZA, QUADV, ADJUVANTED, 65 YRS =, IM, PF, PREFILL SYR, 0.5ML (FLUAD)  -     atorvastatin (LIPITOR) 10 MG tablet;  Take 1 tablet by mouth every other day NIGHTLY

## 2021-09-30 NOTE — PATIENT INSTRUCTIONS
Personalized Preventive Plan for Alber Anne - 9/30/2021  Medicare offers a range of preventive health benefits. Some of the tests and screenings are paid in full while other may be subject to a deductible, co-insurance, and/or copay. Some of these benefits include a comprehensive review of your medical history including lifestyle, illnesses that may run in your family, and various assessments and screenings as appropriate. After reviewing your medical record and screening and assessments performed today your provider may have ordered immunizations, labs, imaging, and/or referrals for you. A list of these orders (if applicable) as well as your Preventive Care list are included within your After Visit Summary for your review. Other Preventive Recommendations:    · A preventive eye exam performed by an eye specialist is recommended every 1-2 years to screen for glaucoma; cataracts, macular degeneration, and other eye disorders. · A preventive dental visit is recommended every 6 months. · Try to get at least 150 minutes of exercise per week or 10,000 steps per day on a pedometer . · Order or download the FREE \"Exercise & Physical Activity: Your Everyday Guide\" from [employer]. Call 6-686.198.6914 or search [employer] online. · You need 0092-7366 mg of calcium and 7395-3014 IU of vitamin D per day. It is possible to meet your calcium requirement with diet alone, but a vitamin D supplement is usually necessary to meet this goal.  · When exposed to the sun, use a sunscreen that protects against both UVA and UVB radiation with an SPF of 30 or greater. Reapply every 2 to 3 hours or after sweating, drying off with a towel, or swimming. · Always wear a seat belt when traveling in a car. Always wear a helmet when riding a bicycle or motorcycle.

## 2021-10-04 ENCOUNTER — TELEPHONE (OUTPATIENT)
Dept: PRIMARY CARE CLINIC | Age: 86
End: 2021-10-04

## 2021-10-04 ENCOUNTER — OFFICE VISIT (OUTPATIENT)
Dept: FAMILY MEDICINE CLINIC | Age: 86
End: 2021-10-04
Payer: MEDICARE

## 2021-10-04 VITALS
OXYGEN SATURATION: 97 % | TEMPERATURE: 97.3 F | SYSTOLIC BLOOD PRESSURE: 122 MMHG | BODY MASS INDEX: 28.89 KG/M2 | DIASTOLIC BLOOD PRESSURE: 80 MMHG | WEIGHT: 179 LBS | HEART RATE: 54 BPM

## 2021-10-04 DIAGNOSIS — H61.21 IMPACTED CERUMEN OF RIGHT EAR: Primary | ICD-10-CM

## 2021-10-04 PROCEDURE — G8484 FLU IMMUNIZE NO ADMIN: HCPCS | Performed by: FAMILY MEDICINE

## 2021-10-04 PROCEDURE — G8427 DOCREV CUR MEDS BY ELIG CLIN: HCPCS | Performed by: FAMILY MEDICINE

## 2021-10-04 PROCEDURE — 1036F TOBACCO NON-USER: CPT | Performed by: FAMILY MEDICINE

## 2021-10-04 PROCEDURE — 99213 OFFICE O/P EST LOW 20 MIN: CPT | Performed by: FAMILY MEDICINE

## 2021-10-04 PROCEDURE — 1123F ACP DISCUSS/DSCN MKR DOCD: CPT | Performed by: FAMILY MEDICINE

## 2021-10-04 PROCEDURE — G8417 CALC BMI ABV UP PARAM F/U: HCPCS | Performed by: FAMILY MEDICINE

## 2021-10-04 PROCEDURE — 4040F PNEUMOC VAC/ADMIN/RCVD: CPT | Performed by: FAMILY MEDICINE

## 2021-10-04 ASSESSMENT — ENCOUNTER SYMPTOMS
SHORTNESS OF BREATH: 0
SINUS PRESSURE: 0
NAUSEA: 0
BLOOD IN STOOL: 0
COUGH: 0
RHINORRHEA: 0
DIARRHEA: 0
ABDOMINAL PAIN: 0
COLOR CHANGE: 0
CHEST TIGHTNESS: 0
VOMITING: 0
CONSTIPATION: 0
WHEEZING: 0
APNEA: 0
SORE THROAT: 0
EYE REDNESS: 0
BACK PAIN: 0
EYE PAIN: 0
EYE ITCHING: 0

## 2021-10-04 NOTE — TELEPHONE ENCOUNTER
----- Message from [address] sent at 10/4/2021 11:51 AM EDT -----  Subject: Referral Request    QUESTIONS   Reason for referral request? Pt cannot hear out of R ear and needs   referral for hearing doctor. Pt would like a [facility]) doctor but does   not have a preference. Has the physician seen you for this condition before? Yes  Select a date? 2021-09-30  Select the Provider the patient wants to be referred to, if known (PCP or   Specialist)? Outside Physician - No preferred specialist   Preferred Specialist (if applicable)? Do you already have an appointment scheduled? No  Additional Information for Provider?   ---------------------------------------------------------------------------  --------------  CALL BACK INFO  What is the best way for the office to contact you? Do not leave any   message, patient will call back for answer  Preferred Call Back Phone Number?  [phone]

## 2021-10-04 NOTE — PROGRESS NOTES
Chief Complaint:     Chief Complaint   Patient presents with    Otalgia     Right ear feels blocked. Otalgia   There is pain in the right ear. This is a new problem. The current episode started in the past 7 days. The problem has been unchanged. There has been no fever. Associated symptoms include hearing loss. Pertinent negatives include no abdominal pain, coughing, diarrhea, ear discharge, headaches, neck pain, rash, rhinorrhea, sore throat or vomiting. He has tried nothing for the symptoms. The treatment provided no relief. Patient Active Problem List   Diagnosis    Syncope and collapse    AV block, Mobitz II    Other hyperlipidemia    Head injury       Past Medical History:   Diagnosis Date    Cancer ([facility] [address] 1996    PROSTATE    Hyperlipidemia     Urethral stricture        Past Surgical History:   Procedure Laterality Date    CYSTOSCOPY  1996    SEED IMPLANTATION    CYSTOSCOPY  07/10/2012    retrograde pyelograms urethral dilatation       Current Outpatient Medications   Medication Sig Dispense Refill    atorvastatin (LIPITOR) 10 MG tablet Take 1 tablet by mouth every other day NIGHTLY 90 tablet 1    Vitamin D (CHOLECALCIFEROL) 50 MCG (2000 UT) TABS tablet Take 1 tablet by mouth daily 60 tablet 0     No current facility-administered medications for this visit.        No Known Allergies    Social History     Socioeconomic History    Marital status:      Spouse name: Not on file    Number of children: Not on file    Years of education: Not on file    Highest education level: Not on file   Occupational History    Not on file   Tobacco Use    Smoking status: Never Smoker    Smokeless tobacco: Never Used   Vaping Use    Vaping Use: Never used   Substance and Sexual Activity    Alcohol use: No    Drug use: No    Sexual activity: Not on file   Other Topics Concern    Not on file   Social History Narrative    Not on file     Social Determinants of Health     Financial Resource Strain:     Difficulty of Paying Living Expenses:    Food Insecurity:     Worried About Running Out of Food in the Last Year:     [address] in the Last Year:    Transportation Needs:     Lack of Transportation (Medical):  Lack of Transportation (Non-Medical):    Physical Activity:     Days of Exercise per Week:     Minutes of Exercise per Session:    Stress:     Feeling of Stress :    Social Connections:     Frequency of Communication with Friends and Family:     Frequency of Social Gatherings with Friends and Family:     Attends [employer] Services:     Active Member of Clubs or Organizations:     Attends Club or Organization Meetings:     Marital Status:    Intimate Partner Violence:     Fear of Current or Ex-Partner:     Emotionally Abused:     Physically Abused:     Sexually Abused:        No family history on file. Review of Systems   Constitutional: Negative for activity change, appetite change, fatigue and fever. HENT: Positive for ear pain and hearing loss. Negative for congestion, ear discharge, nosebleeds, rhinorrhea, sinus pressure and sore throat. Eyes: Negative for pain, redness, itching and visual disturbance. Respiratory: Negative for apnea, cough, chest tightness, shortness of breath and wheezing. Cardiovascular: Negative for chest pain, palpitations and leg swelling. Gastrointestinal: Negative for abdominal pain, blood in stool, constipation, diarrhea, nausea and vomiting. Endocrine: Negative. Genitourinary: Negative for decreased urine volume, difficulty urinating, dysuria, frequency, hematuria and urgency. Musculoskeletal: Negative for arthralgias, back pain, gait problem, myalgias and neck pain. Skin: Negative for color change and rash. Allergic/Immunologic: Negative for environmental allergies and food allergies. Neurological: Negative for dizziness, weakness, light-headedness, numbness and headaches.    Hematological: Negative for adenopathy. Does not bruise/bleed easily. Psychiatric/Behavioral: Negative for behavioral problems, dysphoric mood and sleep disturbance. The patient is not nervous/anxious and is not hyperactive. All other systems reviewed and are negative. /80   Pulse 54   Temp 97.3 °F (36.3 °C)   Wt 179 lb (81.2 kg)   SpO2 97%   BMI 28.89 kg/m²     Physical Exam  Vitals and nursing note reviewed. Constitutional:       General: He is not in acute distress. Appearance: Normal appearance. He is well-developed. HENT:      Head: Normocephalic and atraumatic. Right Ear: Hearing, tympanic membrane and external ear normal. No tenderness. No middle ear effusion. There is impacted cerumen. Left Ear: Hearing, tympanic membrane and external ear normal. No tenderness. No middle ear effusion. Nose: Nose normal. No congestion or rhinorrhea. Right Turbinates: Not enlarged. Left Turbinates: Not enlarged. Mouth/Throat:      Mouth: Mucous membranes are moist.      Tongue: No lesions. Pharynx: Oropharynx is clear. No oropharyngeal exudate or posterior oropharyngeal erythema. Eyes:      General: No scleral icterus. Conjunctiva/sclera: Conjunctivae normal.      Pupils: Pupils are equal, round, and reactive to light. Neck:      Thyroid: No thyromegaly. Cardiovascular:      Rate and Rhythm: Normal rate and regular rhythm. Heart sounds: Normal heart sounds. No murmur heard. Pulmonary:      Effort: Pulmonary effort is normal. No respiratory distress. Breath sounds: Normal breath sounds. No wheezing or rales. Abdominal:      General: Bowel sounds are normal. There is no distension. Palpations: Abdomen is soft. Tenderness: There is no abdominal tenderness. Musculoskeletal:         General: No tenderness. Normal range of motion. Cervical back: Normal range of motion and neck supple. No rigidity. No muscular tenderness.    Lymphadenopathy: Cervical: No cervical adenopathy. Skin:     General: Skin is warm and dry. Findings: No erythema or rash. Neurological:      General: No focal deficit present. Mental Status: He is alert and oriented to person, place, and time. Cranial Nerves: No cranial nerve deficit. Deep Tendon Reflexes: Reflexes are normal and symmetric. Reflexes normal.   Psychiatric:         Mood and Affect: Mood normal.                                 ASSESSMENT/PLAN:    Patient Active Problem List   Diagnosis    Syncope and collapse    AV block, Mobitz II    Other hyperlipidemia    Head injury       Cynthia Gutierres was seen today for otalgia. Diagnoses and all orders for this visit:    Impacted cerumen of right ear      Right ear was irrigated with Peroxide/water mixture successfully    Return if symptoms worsen or fail to improve. I spent 20 minutes with this patient. I spent greater than 50% of the time counseling this patient.         Penelope Chapman DO  10/4/2021  5:02 PM

## 2021-11-04 ENCOUNTER — OFFICE VISIT (OUTPATIENT)
Dept: FAMILY MEDICINE CLINIC | Age: 86
End: 2021-11-04
Payer: MEDICARE

## 2021-11-04 VITALS
DIASTOLIC BLOOD PRESSURE: 60 MMHG | HEART RATE: 62 BPM | BODY MASS INDEX: 29.38 KG/M2 | TEMPERATURE: 97.1 F | WEIGHT: 182 LBS | OXYGEN SATURATION: 98 % | SYSTOLIC BLOOD PRESSURE: 120 MMHG

## 2021-11-04 DIAGNOSIS — J01.90 ACUTE NON-RECURRENT SINUSITIS, UNSPECIFIED LOCATION: Primary | ICD-10-CM

## 2021-11-04 DIAGNOSIS — R09.81 NASAL CONGESTION: ICD-10-CM

## 2021-11-04 DIAGNOSIS — H61.21 IMPACTED CERUMEN OF RIGHT EAR: ICD-10-CM

## 2021-11-04 PROCEDURE — G8417 CALC BMI ABV UP PARAM F/U: HCPCS | Performed by: PHYSICIAN ASSISTANT

## 2021-11-04 PROCEDURE — 1123F ACP DISCUSS/DSCN MKR DOCD: CPT | Performed by: PHYSICIAN ASSISTANT

## 2021-11-04 PROCEDURE — 4040F PNEUMOC VAC/ADMIN/RCVD: CPT | Performed by: PHYSICIAN ASSISTANT

## 2021-11-04 PROCEDURE — G8427 DOCREV CUR MEDS BY ELIG CLIN: HCPCS | Performed by: PHYSICIAN ASSISTANT

## 2021-11-04 PROCEDURE — G8484 FLU IMMUNIZE NO ADMIN: HCPCS | Performed by: PHYSICIAN ASSISTANT

## 2021-11-04 PROCEDURE — 1036F TOBACCO NON-USER: CPT | Performed by: PHYSICIAN ASSISTANT

## 2021-11-04 PROCEDURE — 99203 OFFICE O/P NEW LOW 30 MIN: CPT | Performed by: PHYSICIAN ASSISTANT

## 2021-11-04 PROCEDURE — 69210 REMOVE IMPACTED EAR WAX UNI: CPT | Performed by: PHYSICIAN ASSISTANT

## 2021-11-04 RX ORDER — CHLORPHENIRAMINE MALEATE 4 MG/1
4 TABLET ORAL EVERY 6 HOURS PRN
Qty: 20 TABLET | Refills: 0 | Status: SHIPPED
Start: 2021-11-04 | End: 2022-02-21 | Stop reason: ALTCHOICE

## 2021-11-04 RX ORDER — AMOXICILLIN 875 MG/1
875 TABLET, COATED ORAL 2 TIMES DAILY
Qty: 20 TABLET | Refills: 0 | Status: SHIPPED | OUTPATIENT
Start: 2021-11-04 | End: 2021-11-14

## 2021-11-04 NOTE — PROGRESS NOTES
21  Amira Reed : 1932 Sex: male  Age 80 y.o. Subjective:  Chief Complaint   Patient presents with    Drainage     2d     Cerumen Impaction     R ear          HPI:   Amira Reed , 80 y.o. male presents to [facility] for evaluation of cerumen impaction right ear, sinus congestion, drainage    HPI  80-year-old male presents to [facility] for evaluation of sinus congestion, drainage, cerumen impaction of the right ear. The patient is had the symptoms ongoing for quite a while. The patient states that the cerumen impaction has been an ongoing issue and just had his ear irrigated at the beginning of October. The patient states that the sinus congestion drainage has only been ongoing for a couple of days. He is not having chest pain, shortness of breath, fever, chills. No other complaints currently. ROS:   Unless otherwise stated in this report the patient's positive and negative responses for review of systems for constitutional, eyes, ENT, cardiovascular, respiratory, gastrointestinal, neurological, , musculoskeletal, and integument systems and related systems to the presenting problem are either stated in the history of present illness or were not pertinent or were negative for the symptoms and/or complaints related to the presenting medical problem. Positives and pertinent negatives as per HPI. All others reviewed and are negative. PMH:     Past Medical History:   Diagnosis Date    Cancer [facility])     PROSTATE    Hyperlipidemia     Urethral stricture        Past Surgical History:   Procedure Laterality Date    CYSTOSCOPY      SEED IMPLANTATION    CYSTOSCOPY  07/10/2012    retrograde pyelograms urethral dilatation       No family history on file.     Medications:     Current Outpatient Medications:     amoxicillin (AMOXIL) 875 MG tablet, Take 1 tablet by mouth 2 times daily for 10 days, Disp: 20 tablet, Rfl: 0    chlorpheniramine (ALLER-CHLOR) 4 MG tablet, Take 1 tablet by mouth every 6 hours as needed for Allergies, Disp: 20 tablet, Rfl: 0    atorvastatin (LIPITOR) 10 MG tablet, Take 1 tablet by mouth every other day NIGHTLY, Disp: 90 tablet, Rfl: 1    Vitamin D (CHOLECALCIFEROL) 50 MCG (2000 UT) TABS tablet, Take 1 tablet by mouth daily, Disp: 60 tablet, Rfl: 0    Allergies:   No Known Allergies    Social History:     Social History     Tobacco Use    Smoking status: Never Smoker    Smokeless tobacco: Never Used   Vaping Use    Vaping Use: Never used   Substance Use Topics    Alcohol use: No    Drug use: No       Patient lives at home. Physical Exam:     Vitals:    11/04/21 1509   BP: 120/60   Pulse: 62   Temp: 97.1 °F (36.2 °C)   SpO2: 98%   Weight: 182 lb (82.6 kg)       Exam:  Physical Exam  Nurse's notes and vital signs reviewed. The patient is not hypoxic. ? General: Alert, no acute distress, patient resting comfortably Patient is not toxic or lethargic. Skin: Warm, intact, no pallor noted. There is no evidence of rash at this time. Head: Normocephalic, atraumatic  Eye: Normal conjunctiva  Ears, Nose, Throat: Right tympanic membrane obscured because of cerumen, left tympanic membrane clear. No drainage or discharge noted. No pre- or post-auricular tenderness, erythema, or swelling noted. Nasal congestion, rhinorrhea  Posterior oropharynx shows no erythema, tonsillar hypertrophy, or exudate. the uvula is midline. No trismus or drooling is noted. Moist mucous membranes. Neck: No anterior/posterior lymphadenopathy noted. No erythema, no masses, no fluctuance or induration noted. No meningeal signs. Cardiovascular: Regular Rate and Rhythm  Respiratory: No acute distress, no rhonchi, wheezing or crackles noted. No stridor or retractions are noted. Neurological: A&O x4, normal speech  Psychiatric: Cooperative         Testing:           Medical Decision Making:     Vital signs reviewed    Past medical history reviewed.     Allergies reviewed. Medications reviewed. Patient on arrival does not appear to be in any apparent distress or discomfort. The patient has been seen and evaluated. The patient does not appear to be toxic or lethargic. The patient had the right ear irrigated and removal of wax with ear curette. The patient tolerated this well. Repeat evaluation does show tympanic membrane's are clear. Patient does have some nasal congestion rhinorrhea we will treat the patient with amoxicillin chlorphentermine. The patient was educated on the proper dosage of motrin and tylenol and the appropriate intervals of each. The patient is to increase fluid intake over the next several days. The patient is to use OTC nasal spray    The patient is to return to express care or go directly to the emergency department should any of the signs or symptoms worsen. The patient is to followup with primary care physician in 2-3 days for repeat evaluation. The patient has no other questions or concerns at this time the patient will be discharged home. Clinical Impression:   Lin Colon was seen today for drainage and cerumen impaction. Diagnoses and all orders for this visit:    Acute non-recurrent sinusitis, unspecified location    Nasal congestion    Impacted cerumen of right ear  -     07070 - [place] REMOVE IMPACTED EAR WAX    Other orders  -     amoxicillin (AMOXIL) 875 MG tablet; Take 1 tablet by mouth 2 times daily for 10 days  -     chlorpheniramine (ALLER-CHLOR) 4 MG tablet; Take 1 tablet by mouth every 6 hours as needed for Allergies        The patient is to call for any concerns or return if any of the signs or symptoms worsen. The patient is to follow-up with PCP in the next 2-3 days for repeat evaluation repeat assessment or go directly to the emergency department.      SIGNATURE: Frankie Paz III, PA-C

## 2022-02-21 ENCOUNTER — OFFICE VISIT (OUTPATIENT)
Dept: FAMILY MEDICINE CLINIC | Age: 87
End: 2022-02-21
Payer: MEDICARE

## 2022-02-21 VITALS
OXYGEN SATURATION: 98 % | HEIGHT: 63 IN | HEART RATE: 79 BPM | SYSTOLIC BLOOD PRESSURE: 118 MMHG | BODY MASS INDEX: 31.01 KG/M2 | WEIGHT: 175 LBS | DIASTOLIC BLOOD PRESSURE: 68 MMHG | TEMPERATURE: 97.3 F

## 2022-02-21 DIAGNOSIS — R04.0 EPISTAXIS: Primary | ICD-10-CM

## 2022-02-21 PROCEDURE — 99213 OFFICE O/P EST LOW 20 MIN: CPT | Performed by: FAMILY MEDICINE

## 2022-02-21 PROCEDURE — 4040F PNEUMOC VAC/ADMIN/RCVD: CPT | Performed by: FAMILY MEDICINE

## 2022-02-21 PROCEDURE — G8417 CALC BMI ABV UP PARAM F/U: HCPCS | Performed by: FAMILY MEDICINE

## 2022-02-21 PROCEDURE — 1036F TOBACCO NON-USER: CPT | Performed by: FAMILY MEDICINE

## 2022-02-21 PROCEDURE — G8484 FLU IMMUNIZE NO ADMIN: HCPCS | Performed by: FAMILY MEDICINE

## 2022-02-21 PROCEDURE — 1123F ACP DISCUSS/DSCN MKR DOCD: CPT | Performed by: FAMILY MEDICINE

## 2022-02-21 PROCEDURE — G8427 DOCREV CUR MEDS BY ELIG CLIN: HCPCS | Performed by: FAMILY MEDICINE

## 2022-02-21 ASSESSMENT — ENCOUNTER SYMPTOMS
COUGH: 0
NAUSEA: 0
BACK PAIN: 0
WHEEZING: 0
VOMITING: 0
SHORTNESS OF BREATH: 0
SORE THROAT: 0
EYE REDNESS: 0
BLOOD IN STOOL: 0
CONSTIPATION: 0
SINUS PRESSURE: 0
DIARRHEA: 0
APNEA: 0
CHEST TIGHTNESS: 0
COLOR CHANGE: 0
EYE PAIN: 0
RHINORRHEA: 0
ABDOMINAL PAIN: 0
EYE ITCHING: 0

## 2022-02-21 NOTE — PROGRESS NOTES
Chief Complaint:     Chief Complaint   Patient presents with    Epistaxis     Started yesterday. Blew nose and haven't talked since. Epistaxis   The bleeding has been from the left nare. This is a recurrent problem. The current episode started today. The problem has been resolved. The bleeding is associated with dry air and recent URI. He has tried nothing for the symptoms. The treatment provided no relief. His past medical history is significant for sinus problems. Patient Active Problem List   Diagnosis    Syncope and collapse    AV block, Mobitz II    Other hyperlipidemia    Head injury       Past Medical History:   Diagnosis Date    Cancer ([facility] [address] 1996    PROSTATE    Hyperlipidemia     Urethral stricture        Past Surgical History:   Procedure Laterality Date    CYSTOSCOPY  1996    SEED IMPLANTATION    CYSTOSCOPY  07/10/2012    retrograde pyelograms urethral dilatation       Current Outpatient Medications   Medication Sig Dispense Refill    atorvastatin (LIPITOR) 10 MG tablet Take 1 tablet by mouth every other day NIGHTLY 90 tablet 1     No current facility-administered medications for this visit.        No Known Allergies    Social History     Socioeconomic History    Marital status:      Spouse name: None    Number of children: None    Years of education: None    Highest education level: None   Occupational History    None   Tobacco Use    Smoking status: Never Smoker    Smokeless tobacco: Never Used   Vaping Use    Vaping Use: Never used   Substance and Sexual Activity    Alcohol use: No    Drug use: No    Sexual activity: None   Other Topics Concern    None   Social History Narrative    None     Social Determinants of Health     Financial Resource Strain:     Difficulty of Paying Living Expenses: Not on file   Food Insecurity:     Worried About Running Out of Food in the Last Year: Not on file    [place] of Food in the Last Year: Not on HRomelia Ramachandran Needs:     Lack of Transportation (Medical): Not on file    Lack of Transportation (Non-Medical): Not on file   Physical Activity:     Days of Exercise per Week: Not on file    Minutes of Exercise per Session: Not on file   Stress:     Feeling of Stress : Not on file   Social Connections:     Frequency of Communication with Friends and Family: Not on file    Frequency of Social Gatherings with Friends and Family: Not on file    Attends Latter day Services: Not on file    Active Member of [address] [place] [address] or Organizations: Not on file    Attends Club or Organization Meetings: Not on file    Marital Status: Not on file   Intimate Partner Violence:     Fear of Current or Ex-Partner: Not on file    Emotionally Abused: Not on file    Physically Abused: Not on file    Sexually Abused: Not on file   Housing Stability:     Unable to Pay for Housing in the Last Year: Not on file    Number of [place] in the Last Year: Not on file    Unstable Housing in the Last Year: Not on file       History reviewed. No pertinent family history. Review of Systems   Constitutional: Negative for activity change, appetite change, fatigue and fever. HENT: Positive for nosebleeds. Negative for congestion, ear pain, hearing loss, rhinorrhea, sinus pressure and sore throat. Eyes: Negative for pain, redness, itching and visual disturbance. Respiratory: Negative for apnea, cough, chest tightness, shortness of breath and wheezing. Cardiovascular: Negative for chest pain, palpitations and leg swelling. Gastrointestinal: Negative for abdominal pain, blood in stool, constipation, diarrhea, nausea and vomiting. Endocrine: Negative. Genitourinary: Negative for decreased urine volume, difficulty urinating, dysuria, frequency, hematuria and urgency. Musculoskeletal: Negative for arthralgias, back pain, gait problem, myalgias and neck pain. Skin: Negative for color change and rash.    Allergic/Immunologic: Negative for environmental allergies and food allergies. Neurological: Negative for dizziness, weakness, light-headedness, numbness and headaches. Hematological: Negative for adenopathy. Does not bruise/bleed easily. Psychiatric/Behavioral: Negative for behavioral problems, dysphoric mood and sleep disturbance. The patient is not nervous/anxious and is not hyperactive. All other systems reviewed and are negative. /68   Pulse 79   Temp 97.3 °F (36.3 °C)   Ht 5' 3\" (1.6 m)   Wt 175 lb (79.4 kg)   SpO2 98%   BMI 31.00 kg/m²     Physical Exam  Vitals and nursing note reviewed. Constitutional:       General: He is not in acute distress. Appearance: Normal appearance. He is well-developed. HENT:      Head: Normocephalic and atraumatic. Right Ear: Hearing, tympanic membrane and external ear normal. No tenderness. No middle ear effusion. Left Ear: Hearing, tympanic membrane and external ear normal. No tenderness. No middle ear effusion. Nose: Nose normal. No congestion or rhinorrhea. Right Turbinates: Not enlarged. Left Turbinates: Not enlarged. Mouth/Throat:      Mouth: Mucous membranes are moist.      Tongue: No lesions. Pharynx: Oropharynx is clear. No oropharyngeal exudate or posterior oropharyngeal erythema. Eyes:      General: No scleral icterus. Conjunctiva/sclera: Conjunctivae normal.      Pupils: Pupils are equal, round, and reactive to light. Neck:      Thyroid: No thyromegaly. Cardiovascular:      Rate and Rhythm: Normal rate and regular rhythm. Heart sounds: Normal heart sounds. No murmur heard. Pulmonary:      Effort: Pulmonary effort is normal. No respiratory distress. Breath sounds: Normal breath sounds. No wheezing or rales. Abdominal:      General: Bowel sounds are normal. There is no distension. Palpations: Abdomen is soft. Tenderness: There is no abdominal tenderness.    Musculoskeletal:         General: No

## 2022-04-18 ENCOUNTER — HOSPITAL ENCOUNTER (EMERGENCY)
Age: 87
Discharge: HOME | End: 2022-04-18
Attending: EMERGENCY MEDICINE
Payer: MEDICARE

## 2022-04-18 VITALS
SYSTOLIC BLOOD PRESSURE: 135 MMHG | WEIGHT: 178 LBS | BODY MASS INDEX: 31.53 KG/M2 | TEMPERATURE: 96.4 F | OXYGEN SATURATION: 99 % | HEART RATE: 65 BPM | DIASTOLIC BLOOD PRESSURE: 63 MMHG | RESPIRATION RATE: 18 BRPM

## 2022-04-18 DIAGNOSIS — L81.9 BLEEDING PIGMENTED SKIN LESION: Primary | ICD-10-CM

## 2022-04-18 PROCEDURE — 99283 EMERGENCY DEPT VISIT LOW MDM: CPT

## 2022-04-19 NOTE — ED PROVIDER NOTES
[address] [facility]  Department of Emergency Medicine   ED  Encounter Note  Admit Date/RoomTime: 2022  8:47 PM  ED Room: 36/36  NAME: Chidi Mondragon  : 1932  MRN: [id number]     Chief Complaint:  Arm Injury (pt stated his left arm is bleeding. started with a blister and yesterday started bleeding)    HISTORY OF PRESENT ILLNESS        Chidi Mondragon is a 80 y.o. male who presents to the ED by private vehicle for bleeding sore the left forearm, beginning a few days ago. The complaint has been persistent and are mild in severity. He reports he noticed small area of brown discolored skin start to become bubbled up. After a couple days he noticed bleeding underneath his skin and then it broke open and has been bleeding ever since. He reports it does not really hurt. ROS   Pertinent positives and negatives are stated within HPI, all other systems reviewed and are negative. Past Medical History:  has a past medical history of Cancer ([address] Hyperlipidemia, and Urethral stricture. Surgical History:  has a past surgical history that includes Cystocopy () and Cystocopy (07/10/2012). Social History:  reports that he has never smoked. He has never used smokeless tobacco. He reports that he does not drink alcohol and does not use drugs. Family History: family history is not on file. Allergies: Patient has no known allergies. PHYSICAL EXAM   Oxygen Saturation Interpretation: Normal on room air analysis. ED Triage Vitals [22]   BP Temp Temp Source Pulse Resp SpO2 Height Weight   135/63 96.4 °F (35.8 °C) Tympanic 65 18 99 % -- 178 lb (80.7 kg)         Physical Exam  Constitutional/General: Alert and oriented x3, well appearing, non toxic  HEENT:  NC/NT. PERRLA,  Airway patent. Neck: Supple, full ROM, non tender to palpation in the midline. Respiratory: Lungs clear to auscultation bilaterally, no wheezes, rales, or rhonchi.  Not in respiratory distress  CV:  Regular rate. Regular rhythm. Musculoskeletal: Moves all extremities x 4. Warm and well perfused, no clubbing, cyanosis, or edema. Capillary refill <3 seconds over the left brachioradialis there is a 1.5 cm domed area which appears black. This is surrounding by a Eyak of brown pigmented skin and further surrounded by ecchymosis of the dorsal fore arm, see photos below. I cleaned as much of the dried blood off as I could and the central area which may be a clot but it was very firm and I felt it was not appropriate to remove it as either it is melanotic lesion or deep-seated clot which would then cause excessive bleeding. Covered the wound with bulky gauze pads and a gauze roll. Integument: skin warm and dry. See above. .   Lymphatic: no lymphadenopathy noted  Neurologic: GCS 15, no focal deficits, symmetric strength 5/5 in the upper and lower extremities bilaterally  Psychiatric: Normal Affect            Lab / Imaging Results   (All laboratory and radiology results have been personally reviewed by myself)  Labs:  No results found for this visit on 04/18/22. Imaging: All Radiology results interpreted by Radiologist unless otherwise noted. No orders to display       ED Course / Medical Decision Making   Medications - No data to display       Consult(s):   None    Procedure(s):   none    MDM:   Patient presents with a developing bleeding lesion on his left forearm. Wound was cleaned in department and there is a dark brown patch of hyperpigmented skin with a central core. It may have been a clot in the area but it was very firm and difficult to move and I felt it was inappropriate to remove it in department. I cleaned and dressed the wound with bulky gauze pads and gauze roll. I provided the patient with more dressings for home and advised follow-up with dermatology.   Advised try to obtain a dermatology appointment through primary care as it will probably happen faster although I did provide a dermatology referral on his paperwork. Plan of Care/Counseling:  Akshat Dow PA-C reviewed today's visit with the patient in addition to providing specific details for the plan of care and counseling regarding the diagnosis and prognosis. Questions are answered at this time and are agreeable with the plan. ASSESSMENT     1. Bleeding pigmented skin lesion      PLAN   Discharged home. Patient condition is stable    New Medications     New Prescriptions    No medications on file     Electronically signed by Akshat Dow PA-C   DD: 4/18/22  **This report was transcribed using voice recognition software. Every effort was made to ensure accuracy; however, inadvertent computerized transcription errors may be present.   END OF ED PROVIDER NOTE       Akshat Dow PA-C  04/19/22 0159

## 2022-04-20 ENCOUNTER — OFFICE VISIT (OUTPATIENT)
Dept: PRIMARY CARE CLINIC | Age: 87
End: 2022-04-20
Payer: MEDICARE

## 2022-04-20 VITALS
WEIGHT: 184 LBS | TEMPERATURE: 97.2 F | SYSTOLIC BLOOD PRESSURE: 128 MMHG | BODY MASS INDEX: 29.57 KG/M2 | DIASTOLIC BLOOD PRESSURE: 78 MMHG | HEART RATE: 72 BPM | OXYGEN SATURATION: 96 % | HEIGHT: 66 IN

## 2022-04-20 DIAGNOSIS — L98.9 SKIN LESION OF LEFT UPPER EXTREMITY: Primary | ICD-10-CM

## 2022-04-20 PROCEDURE — G8427 DOCREV CUR MEDS BY ELIG CLIN: HCPCS | Performed by: FAMILY MEDICINE

## 2022-04-20 PROCEDURE — 1123F ACP DISCUSS/DSCN MKR DOCD: CPT | Performed by: FAMILY MEDICINE

## 2022-04-20 PROCEDURE — 4040F PNEUMOC VAC/ADMIN/RCVD: CPT | Performed by: FAMILY MEDICINE

## 2022-04-20 PROCEDURE — 1036F TOBACCO NON-USER: CPT | Performed by: FAMILY MEDICINE

## 2022-04-20 PROCEDURE — G8417 CALC BMI ABV UP PARAM F/U: HCPCS | Performed by: FAMILY MEDICINE

## 2022-04-20 PROCEDURE — 99213 OFFICE O/P EST LOW 20 MIN: CPT | Performed by: FAMILY MEDICINE

## 2022-04-20 ASSESSMENT — ENCOUNTER SYMPTOMS
SHORTNESS OF BREATH: 0
DIARRHEA: 0
ALLERGIC/IMMUNOLOGIC NEGATIVE: 1
COUGH: 0
ABDOMINAL PAIN: 0
COLOR CHANGE: 0
NAUSEA: 0
BACK PAIN: 0
SORE THROAT: 0
VOMITING: 0
APNEA: 0
BLOOD IN STOOL: 0
WHEEZING: 0
PHOTOPHOBIA: 0
SINUS PRESSURE: 0
FACIAL SWELLING: 0
CHEST TIGHTNESS: 0

## 2022-04-20 NOTE — PROGRESS NOTES
Chief Complaint:   Chief Complaint   Patient presents with    Referral - General     dermatology       Rash  This is a new problem. The current episode started in the past 7 days. The affected locations include the left arm. The rash is characterized by redness. Pertinent negatives include no congestion, cough, diarrhea, shortness of breath, sore throat or vomiting. Patient Active Problem List   Diagnosis    Syncope and collapse    AV block, Mobitz II    Other hyperlipidemia    Head injury       Past Medical History:   Diagnosis Date    Cancer ([facility] [address] 1996    PROSTATE    Hyperlipidemia     Urethral stricture        Past Surgical History:   Procedure Laterality Date    CYSTOSCOPY  1996    SEED IMPLANTATION    CYSTOSCOPY  07/10/2012    retrograde pyelograms urethral dilatation       Current Outpatient Medications   Medication Sig Dispense Refill    atorvastatin (LIPITOR) 10 MG tablet Take 1 tablet by mouth every other day NIGHTLY 90 tablet 1     No current facility-administered medications for this visit. No Known Allergies    Social History     Socioeconomic History    Marital status:      Spouse name: Not on file    Number of children: Not on file    Years of education: Not on file    Highest education level: Not on file   Occupational History    Not on file   Tobacco Use    Smoking status: Never Smoker    Smokeless tobacco: Never Used   Vaping Use    Vaping Use: Never used   Substance and Sexual Activity    Alcohol use: No    Drug use: No    Sexual activity: Not on file   Other Topics Concern    Not on file   Social History Narrative    Not on file     Social Determinants of Health     Financial Resource Strain:     Difficulty of Paying Living Expenses: Not on file   Food Insecurity:     Worried About Running Out of Food in the Last Year: Not on file    [place] of Food in the Last Year: Not on file   Transportation Needs:     Lack of Transportation (Medical):  Not on file  Lack of Transportation (Non-Medical): Not on file   Physical Activity:     Days of Exercise per Week: Not on file    Minutes of Exercise per Session: Not on file   Stress:     Feeling of Stress : Not on file   Social Connections:     Frequency of Communication with Friends and Family: Not on file    Frequency of Social Gatherings with Friends and Family: Not on file    Attends Church Services: Not on file    Active Member of [address] [place] [address] or Organizations: Not on file    Attends Club or Organization Meetings: Not on file    Marital Status: Not on file   Intimate Partner Violence:     Fear of Current or Ex-Partner: Not on file    Emotionally Abused: Not on file    Physically Abused: Not on file    Sexually Abused: Not on file   Housing Stability:     Unable to Pay for Housing in the Last Year: Not on file    Number of [place] in the Last Year: Not on file    Unstable Housing in the Last Year: Not on file       No family history on file. Review of Systems   Constitutional: Negative. HENT: Negative for congestion, facial swelling, hearing loss, nosebleeds, sinus pressure and sore throat. Eyes: Negative for photophobia and visual disturbance. Respiratory: Negative for apnea, cough, chest tightness, shortness of breath and wheezing. Cardiovascular: Negative for chest pain, palpitations and leg swelling. Gastrointestinal: Negative for abdominal pain, blood in stool, diarrhea, nausea and vomiting. Genitourinary: Negative for difficulty urinating, frequency and urgency. Musculoskeletal: Negative for arthralgias, back pain, joint swelling and myalgias. Skin: Positive for rash. Negative for color change. Allergic/Immunologic: Negative. Neurological: Negative for syncope, weakness, light-headedness and headaches. Hematological: Negative. Psychiatric/Behavioral: Negative for agitation, behavioral problems, confusion and self-injury. The patient is not nervous/anxious.     All other systems reviewed and are negative. Physical Exam  Vitals and nursing note reviewed. Constitutional:       General: He is not in acute distress. Appearance: He is well-developed. HENT:      Head: Normocephalic and atraumatic. Nose: Nose normal.   Eyes:      Conjunctiva/sclera: Conjunctivae normal.      Pupils: Pupils are equal, round, and reactive to light. Neck:      Thyroid: No thyromegaly. Vascular: No JVD. Cardiovascular:      Rate and Rhythm: Normal rate and regular rhythm. Heart sounds: Normal heart sounds. No murmur heard. No friction rub. No gallop. Pulmonary:      Effort: Pulmonary effort is normal. No respiratory distress. Breath sounds: Normal breath sounds. No wheezing. Abdominal:      General: Bowel sounds are normal. There is no distension. Palpations: Abdomen is soft. Tenderness: There is no abdominal tenderness. There is no guarding or rebound. Musculoskeletal:         General: Normal range of motion. Cervical back: Normal range of motion and neck supple. Lymphadenopathy:      Cervical: No cervical adenopathy. Skin:     General: Skin is warm and dry. Findings: No erythema or rash. Neurological:      Mental Status: He is alert and oriented to person, place, and time. Cranial Nerves: No cranial nerve deficit. Motor: No abnormal muscle tone. Coordination: Coordination normal.      Deep Tendon Reflexes: Reflexes are normal and symmetric.    Psychiatric:         Behavior: Behavior normal.         Judgment: Judgment normal.                               ASSESSMENT/PLAN:    Walt Daughters was seen today for referral - general.    Diagnoses and all orders for this visit:    Skin lesion of left upper extremity  -     Tom Hartmann D.O, Dermatology, [place] (TERRELL Murdock DO    4/20/2022  10:00 AM

## 2022-04-25 ENCOUNTER — OFFICE VISIT (OUTPATIENT)
Dept: PRIMARY CARE CLINIC | Age: 87
End: 2022-04-25
Payer: MEDICARE

## 2022-04-25 VITALS
HEART RATE: 57 BPM | DIASTOLIC BLOOD PRESSURE: 64 MMHG | HEIGHT: 66 IN | WEIGHT: 184 LBS | RESPIRATION RATE: 16 BRPM | OXYGEN SATURATION: 97 % | SYSTOLIC BLOOD PRESSURE: 120 MMHG | BODY MASS INDEX: 29.57 KG/M2

## 2022-04-25 DIAGNOSIS — B96.89 ACUTE BACTERIAL SINUSITIS: Primary | ICD-10-CM

## 2022-04-25 DIAGNOSIS — S41.112A SKIN TEAR OF LEFT UPPER EXTREMITY: ICD-10-CM

## 2022-04-25 DIAGNOSIS — J01.90 ACUTE BACTERIAL SINUSITIS: Primary | ICD-10-CM

## 2022-04-25 PROCEDURE — 1123F ACP DISCUSS/DSCN MKR DOCD: CPT | Performed by: FAMILY MEDICINE

## 2022-04-25 PROCEDURE — 4040F PNEUMOC VAC/ADMIN/RCVD: CPT | Performed by: FAMILY MEDICINE

## 2022-04-25 PROCEDURE — 99213 OFFICE O/P EST LOW 20 MIN: CPT | Performed by: FAMILY MEDICINE

## 2022-04-25 PROCEDURE — G8427 DOCREV CUR MEDS BY ELIG CLIN: HCPCS | Performed by: FAMILY MEDICINE

## 2022-04-25 PROCEDURE — G8417 CALC BMI ABV UP PARAM F/U: HCPCS | Performed by: FAMILY MEDICINE

## 2022-04-25 PROCEDURE — 1036F TOBACCO NON-USER: CPT | Performed by: FAMILY MEDICINE

## 2022-04-25 RX ORDER — CEFDINIR 300 MG/1
300 CAPSULE ORAL 2 TIMES DAILY
Qty: 20 CAPSULE | Refills: 0 | Status: SHIPPED | OUTPATIENT
Start: 2022-04-25 | End: 2022-05-05

## 2022-04-25 ASSESSMENT — ENCOUNTER SYMPTOMS
SORE THROAT: 0
SHORTNESS OF BREATH: 0
CHEST TIGHTNESS: 0
EYE PAIN: 0
EYE ITCHING: 0
RHINORRHEA: 0
EYE REDNESS: 0
VOMITING: 0
DIARRHEA: 0
BACK PAIN: 0
ABDOMINAL PAIN: 0
CONSTIPATION: 0
APNEA: 0
BLOOD IN STOOL: 0
SINUS PRESSURE: 1
WHEEZING: 0
COLOR CHANGE: 0
HOARSE VOICE: 1
NAUSEA: 0
COUGH: 1
SINUS COMPLAINT: 1

## 2022-04-25 NOTE — PROGRESS NOTES
Chief Complaint:     Chief Complaint   Patient presents with    Skin Lesion     left arm, blood blister broke open, x2 weeks    Sinus Problem         Sinus Problem  This is a new problem. The current episode started in the past 7 days. The problem is unchanged. There has been no fever. The pain is mild. Associated symptoms include congestion, coughing, headaches, a hoarse voice, sinus pressure and sneezing. Pertinent negatives include no ear pain, neck pain, shortness of breath or sore throat. Past treatments include nothing. The treatment provided no relief. Patient Active Problem List   Diagnosis    Syncope and collapse    AV block, Mobitz II    Other hyperlipidemia    Head injury       Past Medical History:   Diagnosis Date    Cancer ([facility] [address] 1996    PROSTATE    Hyperlipidemia     Urethral stricture        Past Surgical History:   Procedure Laterality Date    CYSTOSCOPY  1996    SEED IMPLANTATION    CYSTOSCOPY  07/10/2012    retrograde pyelograms urethral dilatation       Current Outpatient Medications   Medication Sig Dispense Refill    cefdinir (OMNICEF) 300 MG capsule Take 1 capsule by mouth 2 times daily for 10 days 20 capsule 0    atorvastatin (LIPITOR) 10 MG tablet Take 1 tablet by mouth every other day NIGHTLY 90 tablet 1     No current facility-administered medications for this visit.        No Known Allergies    Social History     Socioeconomic History    Marital status:      Spouse name: None    Number of children: None    Years of education: None    Highest education level: None   Occupational History    None   Tobacco Use    Smoking status: Never Smoker    Smokeless tobacco: Never Used   Vaping Use    Vaping Use: Never used   Substance and Sexual Activity    Alcohol use: No    Drug use: No    Sexual activity: None   Other Topics Concern    None   Social History Narrative    None     Social Determinants of Health     Financial Resource Strain:     Difficulty of Paying Living Expenses: Not on file   Food Insecurity:     Worried About Running Out of Food in the Last Year: Not on file    Ran Out of Food in the Last Year: Not on file   Transportation Needs:     Lack of Transportation (Medical): Not on file    Lack of Transportation (Non-Medical): Not on file   Physical Activity:     Days of Exercise per Week: Not on file    Minutes of Exercise per Session: Not on file   Stress:     Feeling of Stress : Not on file   Social Connections:     Frequency of Communication with Friends and Family: Not on file    Frequency of Social Gatherings with Friends and Family: Not on file    Attends Mandaen Services: Not on file    Active Member of [address] [place] [address] [employer] or Organizations: Not on file    Attends Club or Organization Meetings: Not on file    Marital Status: Not on file   Intimate Partner Violence:     Fear of Current or Ex-Partner: Not on file    Emotionally Abused: Not on file    Physically Abused: Not on file    Sexually Abused: Not on file   Housing Stability:     Unable to Pay for Housing in the Last Year: Not on file    Number of [place] in the Last Year: Not on file    Unstable Housing in the Last Year: Not on file       History reviewed. No pertinent family history. Review of Systems   Constitutional: Negative for activity change, appetite change, fatigue and fever. HENT: Positive for congestion, hoarse voice, sinus pressure and sneezing. Negative for ear pain, hearing loss, nosebleeds, rhinorrhea and sore throat. Eyes: Negative for pain, redness, itching and visual disturbance. Respiratory: Positive for cough. Negative for apnea, chest tightness, shortness of breath and wheezing. Cardiovascular: Negative for chest pain, palpitations and leg swelling. Gastrointestinal: Negative for abdominal pain, blood in stool, constipation, diarrhea, nausea and vomiting. Endocrine: Negative.     Genitourinary: Negative for decreased urine volume, difficulty sounds. No wheezing or rales. Abdominal:      General: Bowel sounds are normal. There is no distension. Palpations: Abdomen is soft. Tenderness: There is no abdominal tenderness. Musculoskeletal:         General: No tenderness. Normal range of motion. Cervical back: Normal range of motion and neck supple. No rigidity. No muscular tenderness. Lymphadenopathy:      Cervical: No cervical adenopathy. Skin:     General: Skin is warm and dry. Findings: No erythema or rash. Neurological:      General: No focal deficit present. Mental Status: He is alert and oriented to person, place, and time. Cranial Nerves: No cranial nerve deficit. Deep Tendon Reflexes: Reflexes are normal and symmetric. Reflexes normal.   Psychiatric:         Mood and Affect: Mood normal.                                 ASSESSMENT/PLAN:    Patient Active Problem List   Diagnosis    Syncope and collapse    AV block, Mobitz II    Other hyperlipidemia    Head injury       Jessie Ryan was seen today for skin lesion and sinus problem. Diagnoses and all orders for this visit:    Acute bacterial sinusitis    Skin tear of left upper extremity    Other orders  -     cefdinir (OMNICEF) 300 MG capsule; Take 1 capsule by mouth 2 times daily for 10 days          Return if symptoms worsen or fail to improve. I spent 20 minutes with this patient. I spent greater than 50% of the time counseling this patient.         Ana Maria Kohli DO  4/25/2022  11:40 AM

## 2022-05-06 ENCOUNTER — OFFICE VISIT (OUTPATIENT)
Dept: FAMILY MEDICINE CLINIC | Age: 87
End: 2022-05-06
Payer: MEDICARE

## 2022-05-06 VITALS
HEART RATE: 56 BPM | RESPIRATION RATE: 16 BRPM | BODY MASS INDEX: 29.57 KG/M2 | SYSTOLIC BLOOD PRESSURE: 128 MMHG | DIASTOLIC BLOOD PRESSURE: 70 MMHG | WEIGHT: 184 LBS | OXYGEN SATURATION: 94 % | HEIGHT: 66 IN | TEMPERATURE: 96.8 F

## 2022-05-06 DIAGNOSIS — J01.90 ACUTE NON-RECURRENT SINUSITIS, UNSPECIFIED LOCATION: Primary | ICD-10-CM

## 2022-05-06 PROCEDURE — 1123F ACP DISCUSS/DSCN MKR DOCD: CPT | Performed by: FAMILY MEDICINE

## 2022-05-06 PROCEDURE — G8427 DOCREV CUR MEDS BY ELIG CLIN: HCPCS | Performed by: FAMILY MEDICINE

## 2022-05-06 PROCEDURE — 99213 OFFICE O/P EST LOW 20 MIN: CPT | Performed by: FAMILY MEDICINE

## 2022-05-06 PROCEDURE — 1036F TOBACCO NON-USER: CPT | Performed by: FAMILY MEDICINE

## 2022-05-06 PROCEDURE — G8417 CALC BMI ABV UP PARAM F/U: HCPCS | Performed by: FAMILY MEDICINE

## 2022-05-06 PROCEDURE — 4040F PNEUMOC VAC/ADMIN/RCVD: CPT | Performed by: FAMILY MEDICINE

## 2022-05-06 RX ORDER — CETIRIZINE HYDROCHLORIDE 10 MG/1
10 TABLET ORAL DAILY
Qty: 30 TABLET | Refills: 0 | Status: SHIPPED | OUTPATIENT
Start: 2022-05-06 | End: 2022-06-05

## 2022-05-06 RX ORDER — CEFDINIR 300 MG/1
300 CAPSULE ORAL 2 TIMES DAILY
Qty: 10 CAPSULE | Refills: 0 | Status: SHIPPED | OUTPATIENT
Start: 2022-05-06 | End: 2022-05-11

## 2022-05-06 ASSESSMENT — ENCOUNTER SYMPTOMS
CHEST TIGHTNESS: 0
COLOR CHANGE: 0
BLOOD IN STOOL: 0
CONSTIPATION: 0
SINUS PRESSURE: 0
SORE THROAT: 0
APNEA: 0
EYE ITCHING: 0
SHORTNESS OF BREATH: 0
DIARRHEA: 0
WHEEZING: 0
COUGH: 1
ABDOMINAL PAIN: 0
EYE REDNESS: 0
VOMITING: 0
BACK PAIN: 0
NAUSEA: 0
EYE PAIN: 0
RHINORRHEA: 1

## 2022-05-06 NOTE — PROGRESS NOTES
Chief Complaint:     Chief Complaint   Patient presents with    Cough         Cough  This is a new problem. The current episode started 1 to 4 weeks ago. The problem has been waxing and waning. The cough is non-productive. Associated symptoms include nasal congestion, postnasal drip and rhinorrhea. Pertinent negatives include no chest pain, ear pain, eye redness, fever, headaches, myalgias, rash, sore throat, shortness of breath or wheezing. Nothing aggravates the symptoms. He has tried OTC cough suppressant for the symptoms. The treatment provided mild relief. There is no history of environmental allergies. Patient Active Problem List   Diagnosis    Syncope and collapse    AV block, Mobitz II    Other hyperlipidemia    Head injury       Past Medical History:   Diagnosis Date    Cancer ([facility] [address] 1996    PROSTATE    Hyperlipidemia     Urethral stricture        Past Surgical History:   Procedure Laterality Date    CYSTOSCOPY  1996    SEED IMPLANTATION    CYSTOSCOPY  07/10/2012    retrograde pyelograms urethral dilatation       Current Outpatient Medications   Medication Sig Dispense Refill    cefdinir (OMNICEF) 300 MG capsule Take 1 capsule by mouth 2 times daily for 5 days 10 capsule 0    cetirizine (ZYRTEC) 10 MG tablet Take 1 tablet by mouth daily 30 tablet 0    atorvastatin (LIPITOR) 10 MG tablet Take 1 tablet by mouth every other day NIGHTLY 90 tablet 1     No current facility-administered medications for this visit.        No Known Allergies    Social History     Socioeconomic History    Marital status:      Spouse name: None    Number of children: None    Years of education: None    Highest education level: None   Occupational History    None   Tobacco Use    Smoking status: Never Smoker    Smokeless tobacco: Never Used   Vaping Use    Vaping Use: Never used   Substance and Sexual Activity    Alcohol use: No    Drug use: No    Sexual activity: None   Other Topics Concern    None Social History Narrative    None     Social Determinants of Health     Financial Resource Strain:     Difficulty of Paying Living Expenses: Not on file   Food Insecurity:     Worried About Running Out of Food in the Last Year: Not on file    [place] of Food in the Last Year: Not on file   Transportation Needs:     Lack of Transportation (Medical): Not on file    Lack of Transportation (Non-Medical): Not on file   Physical Activity:     Days of Exercise per Week: Not on file    Minutes of Exercise per Session: Not on file   Stress:     Feeling of Stress : Not on file   Social Connections:     Frequency of Communication with Friends and Family: Not on file    Frequency of Social Gatherings with Friends and Family: Not on file    Attends [employer] Services: Not on file    Active Member of [address] [place] [address] [employer] or Organizations: Not on file    Attends Club or Organization Meetings: Not on file    Marital Status: Not on file   Intimate Partner Violence:     Fear of Current or Ex-Partner: Not on file    Emotionally Abused: Not on file    Physically Abused: Not on file    Sexually Abused: Not on file   Housing Stability:     Unable to Pay for Housing in the Last Year: Not on file    Number of [place] in the Last Year: Not on file    Unstable Housing in the Last Year: Not on file       History reviewed. No pertinent family history. Review of Systems   Constitutional: Negative for activity change, appetite change, fatigue and fever. HENT: Positive for postnasal drip and rhinorrhea. Negative for congestion, ear pain, hearing loss, nosebleeds, sinus pressure and sore throat. Eyes: Negative for pain, redness, itching and visual disturbance. Respiratory: Positive for cough. Negative for apnea, chest tightness, shortness of breath and wheezing. Cardiovascular: Negative for chest pain, palpitations and leg swelling.    Gastrointestinal: Negative for abdominal pain, blood in stool, constipation, diarrhea, nausea and vomiting. Endocrine: Negative. Genitourinary: Negative for decreased urine volume, difficulty urinating, dysuria, frequency, hematuria and urgency. Musculoskeletal: Negative for arthralgias, back pain, gait problem, myalgias and neck pain. Skin: Negative for color change and rash. Allergic/Immunologic: Negative for environmental allergies and food allergies. Neurological: Negative for dizziness, weakness, light-headedness, numbness and headaches. Hematological: Negative for adenopathy. Does not bruise/bleed easily. Psychiatric/Behavioral: Negative for behavioral problems, dysphoric mood and sleep disturbance. The patient is not nervous/anxious and is not hyperactive. All other systems reviewed and are negative. /70   Pulse 56   Temp 96.8 °F (36 °C)   Resp 16   Ht 5' 6\" (1.676 m)   Wt 184 lb (83.5 kg)   SpO2 94%   BMI 29.70 kg/m²     Physical Exam  Vitals and nursing note reviewed. Constitutional:       General: He is not in acute distress. Appearance: Normal appearance. He is well-developed. HENT:      Head: Normocephalic and atraumatic. Right Ear: Hearing, tympanic membrane and external ear normal. No tenderness. No middle ear effusion. Left Ear: Hearing, tympanic membrane and external ear normal. No tenderness. No middle ear effusion. Nose: Congestion and rhinorrhea present. Right Turbinates: Not enlarged. Left Turbinates: Not enlarged. Mouth/Throat:      Mouth: Mucous membranes are moist.      Tongue: No lesions. Pharynx: Oropharynx is clear. No oropharyngeal exudate or posterior oropharyngeal erythema. Eyes:      General: No scleral icterus. Conjunctiva/sclera: Conjunctivae normal.      Pupils: Pupils are equal, round, and reactive to light. Neck:      Thyroid: No thyromegaly. Cardiovascular:      Rate and Rhythm: Normal rate and regular rhythm. Heart sounds: Normal heart sounds.  No murmur heard.      Pulmonary:      Effort: Pulmonary effort is normal. No respiratory distress. Breath sounds: Normal breath sounds. No wheezing, rhonchi or rales. Abdominal:      General: Bowel sounds are normal. There is no distension. Palpations: Abdomen is soft. Tenderness: There is no abdominal tenderness. Musculoskeletal:         General: No tenderness. Normal range of motion. Cervical back: Normal range of motion and neck supple. No rigidity. No muscular tenderness. Lymphadenopathy:      Cervical: No cervical adenopathy. Skin:     General: Skin is warm and dry. Findings: No erythema or rash. Neurological:      General: No focal deficit present. Mental Status: He is alert and oriented to person, place, and time. Cranial Nerves: No cranial nerve deficit. Deep Tendon Reflexes: Reflexes are normal and symmetric. Reflexes normal.   Psychiatric:         Mood and Affect: Mood normal.                                 ASSESSMENT/PLAN:    Patient Active Problem List   Diagnosis    Syncope and collapse    AV block, Mobitz II    Other hyperlipidemia    Head injury       Jerry Olea was seen today for cough. Diagnoses and all orders for this visit:    Acute non-recurrent sinusitis, unspecified location    Other orders  -     cefdinir (OMNICEF) 300 MG capsule; Take 1 capsule by mouth 2 times daily for 5 days  -     cetirizine (ZYRTEC) 10 MG tablet; Take 1 tablet by mouth daily          Return if symptoms worsen or fail to improve. I spent 20 minutes with this patient. I spent greater than 50% of the time counseling this patient.         Sophie Victoria DO  5/6/2022  12:22 PM

## 2022-06-03 ENCOUNTER — OFFICE VISIT (OUTPATIENT)
Dept: FAMILY MEDICINE CLINIC | Age: 87
End: 2022-06-03
Payer: MEDICARE

## 2022-06-03 VITALS
DIASTOLIC BLOOD PRESSURE: 66 MMHG | HEIGHT: 66 IN | OXYGEN SATURATION: 99 % | HEART RATE: 63 BPM | BODY MASS INDEX: 29.7 KG/M2 | SYSTOLIC BLOOD PRESSURE: 120 MMHG | TEMPERATURE: 97.1 F

## 2022-06-03 DIAGNOSIS — L81.9 BLEEDING PIGMENTED SKIN LESION: Primary | ICD-10-CM

## 2022-06-03 PROCEDURE — 1123F ACP DISCUSS/DSCN MKR DOCD: CPT | Performed by: PHYSICIAN ASSISTANT

## 2022-06-03 PROCEDURE — G8427 DOCREV CUR MEDS BY ELIG CLIN: HCPCS | Performed by: PHYSICIAN ASSISTANT

## 2022-06-03 PROCEDURE — 1036F TOBACCO NON-USER: CPT | Performed by: PHYSICIAN ASSISTANT

## 2022-06-03 PROCEDURE — 99213 OFFICE O/P EST LOW 20 MIN: CPT | Performed by: PHYSICIAN ASSISTANT

## 2022-06-03 PROCEDURE — G8417 CALC BMI ABV UP PARAM F/U: HCPCS | Performed by: PHYSICIAN ASSISTANT

## 2022-06-03 NOTE — PROGRESS NOTES
6/3/22  Lowell Hunt : 1932 Sex: male  Age 80 y.o. Subjective:  Chief Complaint   Patient presents with    Dressing Change     left arm, skin cancer on arm          HPI:   Lowell Hunt , 80 y.o. male presents to express care for evaluation of wound change    HPI  66-year-old male presents to express care for evaluation of a wound change to the left forearm. The patient has this growth and pigmented area on his left forearm. Is been ongoing for at least 2 to 3 months now. He has been seen and evaluated by PCP. Referred to dermatology dermatology has referred to [facility]. The patient is currently undergoing evaluation and multiple scans to see if the skin cancer has spread to other areas. The patient would like it removed. The patient's wife is not able to change the dressing and is here to have the dressing changed. The patient is not having any new symptoms at this point. Patient denies any chest pain, shortness of breath, fevers. ROS:   Unless otherwise stated in this report the patient's positive and negative responses for review of systems for constitutional, eyes, ENT, cardiovascular, respiratory, gastrointestinal, neurological, , musculoskeletal, and integument systems and related systems to the presenting problem are either stated in the history of present illness or were not pertinent or were negative for the symptoms and/or complaints related to the presenting medical problem. Positives and pertinent negatives as per HPI. All others reviewed and are negative. PMH:     Past Medical History:   Diagnosis Date    Cancer [facility])     PROSTATE    Hyperlipidemia     Urethral stricture        Past Surgical History:   Procedure Laterality Date    CYSTOSCOPY      SEED IMPLANTATION    CYSTOSCOPY  07/10/2012    retrograde pyelograms urethral dilatation       No family history on file.     Medications:     Current Outpatient Medications:     cetirizine (ZYRTEC) 10 MG tablet, Take 1 tablet by mouth daily, Disp: 30 tablet, Rfl: 0    atorvastatin (LIPITOR) 10 MG tablet, Take 1 tablet by mouth every other day NIGHTLY, Disp: 90 tablet, Rfl: 1    Allergies:   No Known Allergies    Social History:     Social History     Tobacco Use    Smoking status: Never Smoker    Smokeless tobacco: Never Used   Vaping Use    Vaping Use: Never used   Substance Use Topics    Alcohol use: No    Drug use: No       Patient lives at home. Physical Exam:     Vitals:    06/03/22 1230   BP: 120/66   Pulse: 63   Temp: 97.1 °F (36.2 °C)   SpO2: 99%   Height: 5' 6\" (1.676 m)       Exam:  Physical Exam  Vital signs reviewed and nurse's notes. The patient is not hypoxic. General: Alert, no acute distress, patient resting comfortably   Skin: warm, intact, no pallor noted   Head: Normocephalic, atraumatic   Eye: Normal conjunctiva   Respiratory: No acute distress   Musculoskeletal: The patient has a growth that is approximately 2.5 cm in diameter and pigmented, it is bleeding minimally. The remainder of the forearm is pigmented and discolored as well. The patient has no evidence of abrasion. The patient has no evidence of laceration. There is no active bleeding currently. Pulses intact. The patient has good range of motion. Neurological: alert and orient x4, normal sensory and motor observed. Psychiatric: Cooperative      Testing:           Medical Decision Making:     Vital signs reviewed    Past medical history reviewed. Allergies reviewed. Medications reviewed. Patient on arrival does not appear to be in any apparent distress or discomfort. The patient has been seen and evaluated. The patient does not appear to be toxic or lethargic. The patient had the wound cleaned, irrigated. The patient had a new dressing applied. The patient is to contact his dermatologist for further evaluation and assessment.     The patient is to return to express care or go directly to the emergency department should any of the signs or symptoms worsen. The patient is to followup with primary care physician in 2-3 days for repeat evaluation. The patient has no other questions or concerns at this time the patient will be discharged home. Clinical Impression:   Keyona Contreras was seen today for dressing change. Diagnoses and all orders for this visit:    Bleeding pigmented skin lesion    Other orders  -     Wound care        The patient is to call for any concerns or return if any of the signs or symptoms worsen. The patient is to follow-up with PCP in the next 2-3 days for repeat evaluation repeat assessment or go directly to the emergency department.      SIGNATURE: Robert Rai III, PA-C

## 2022-06-07 ENCOUNTER — OFFICE VISIT (OUTPATIENT)
Dept: FAMILY MEDICINE CLINIC | Age: 87
End: 2022-06-07
Payer: MEDICARE

## 2022-06-07 VITALS
OXYGEN SATURATION: 97 % | WEIGHT: 184 LBS | SYSTOLIC BLOOD PRESSURE: 120 MMHG | RESPIRATION RATE: 18 BRPM | DIASTOLIC BLOOD PRESSURE: 74 MMHG | BODY MASS INDEX: 29.57 KG/M2 | TEMPERATURE: 97.8 F | HEIGHT: 66 IN | HEART RATE: 68 BPM

## 2022-06-07 DIAGNOSIS — L81.9 BLEEDING PIGMENTED SKIN LESION: Primary | ICD-10-CM

## 2022-06-07 PROCEDURE — 1123F ACP DISCUSS/DSCN MKR DOCD: CPT | Performed by: FAMILY MEDICINE

## 2022-06-07 PROCEDURE — G8417 CALC BMI ABV UP PARAM F/U: HCPCS | Performed by: FAMILY MEDICINE

## 2022-06-07 PROCEDURE — 99213 OFFICE O/P EST LOW 20 MIN: CPT | Performed by: FAMILY MEDICINE

## 2022-06-07 PROCEDURE — G8427 DOCREV CUR MEDS BY ELIG CLIN: HCPCS | Performed by: FAMILY MEDICINE

## 2022-06-07 PROCEDURE — 1036F TOBACCO NON-USER: CPT | Performed by: FAMILY MEDICINE

## 2022-06-07 ASSESSMENT — ENCOUNTER SYMPTOMS
SORE THROAT: 0
BLOOD IN STOOL: 0
COLOR CHANGE: 0
DIARRHEA: 0
EYE PAIN: 0
WHEEZING: 0
NAUSEA: 0
SHORTNESS OF BREATH: 0
VOMITING: 0
BACK PAIN: 0
RHINORRHEA: 0
ABDOMINAL PAIN: 0
CHEST TIGHTNESS: 0
EYE REDNESS: 0
CONSTIPATION: 0
APNEA: 0
SINUS PRESSURE: 0
COUGH: 0
EYE ITCHING: 0

## 2022-06-07 ASSESSMENT — PATIENT HEALTH QUESTIONNAIRE - PHQ9
1. LITTLE INTEREST OR PLEASURE IN DOING THINGS: 0
SUM OF ALL RESPONSES TO PHQ QUESTIONS 1-9: 0
SUM OF ALL RESPONSES TO PHQ9 QUESTIONS 1 & 2: 0
2. FEELING DOWN, DEPRESSED OR HOPELESS: 0

## 2022-06-07 NOTE — PROGRESS NOTES
Chief Complaint:     Chief Complaint   Patient presents with    Wound Check         Wound Check  He was originally treated 5 to 10 days ago. Previous treatment included wound cleansing or irrigation. His temperature was unmeasured prior to arrival. There has been bloody discharge from the wound. The redness has not changed. The swelling has not changed. There is no pain present. He has no difficulty moving the affected extremity or digit. Patient Active Problem List   Diagnosis    Syncope and collapse    AV block, Mobitz II    Other hyperlipidemia    Head injury       Past Medical History:   Diagnosis Date    Cancer ([facility] [address] 1996    PROSTATE    Hyperlipidemia     Urethral stricture        Past Surgical History:   Procedure Laterality Date    CYSTOSCOPY  1996    SEED IMPLANTATION    CYSTOSCOPY  07/10/2012    retrograde pyelograms urethral dilatation       Current Outpatient Medications   Medication Sig Dispense Refill    atorvastatin (LIPITOR) 10 MG tablet Take 1 tablet by mouth every other day NIGHTLY 90 tablet 1     No current facility-administered medications for this visit.        No Known Allergies    Social History     Socioeconomic History    Marital status:      Spouse name: None    Number of children: None    Years of education: None    Highest education level: None   Occupational History    None   Tobacco Use    Smoking status: Never Smoker    Smokeless tobacco: Never Used   Vaping Use    Vaping Use: Never used   Substance and Sexual Activity    Alcohol use: No    Drug use: No    Sexual activity: None   Other Topics Concern    None   Social History Narrative    None     Social Determinants of Health     Financial Resource Strain:     Difficulty of Paying Living Expenses: Not on file   Food Insecurity:     Worried About Running Out of Food in the Last Year: Not on file    [place] of Food in the Last Year: Not on file   Transportation Needs:     Lack of Transportation (Medical): Not on file    Lack of Transportation (Non-Medical): Not on file   Physical Activity:     Days of Exercise per Week: Not on file    Minutes of Exercise per Session: Not on file   Stress:     Feeling of Stress : Not on file   Social Connections:     Frequency of Communication with Friends and Family: Not on file    Frequency of Social Gatherings with Friends and Family: Not on file    Attends Synagogue Services: Not on file    Active Member of [address] [place] [address] [employer] or Organizations: Not on file    Attends Club or Organization Meetings: Not on file    Marital Status: Not on file   Intimate Partner Violence:     Fear of Current or Ex-Partner: Not on file    Emotionally Abused: Not on file    Physically Abused: Not on file    Sexually Abused: Not on file   Housing Stability:     Unable to Pay for Housing in the Last Year: Not on file    Number of [place] in the Last Year: Not on file    Unstable Housing in the Last Year: Not on file       History reviewed. No pertinent family history. Review of Systems   Constitutional: Negative for activity change, appetite change, fatigue and fever. HENT: Negative for congestion, ear pain, hearing loss, nosebleeds, rhinorrhea, sinus pressure and sore throat. Eyes: Negative for pain, redness, itching and visual disturbance. Respiratory: Negative for apnea, cough, chest tightness, shortness of breath and wheezing. Cardiovascular: Negative for chest pain, palpitations and leg swelling. Gastrointestinal: Negative for abdominal pain, blood in stool, constipation, diarrhea, nausea and vomiting. Endocrine: Negative. Genitourinary: Negative for decreased urine volume, difficulty urinating, dysuria, frequency, hematuria and urgency. Musculoskeletal: Negative for arthralgias, back pain, gait problem, myalgias and neck pain. Skin: Negative for color change and rash. Allergic/Immunologic: Negative for environmental allergies and food allergies. Neurological: Negative for dizziness, weakness, light-headedness, numbness and headaches. Hematological: Negative for adenopathy. Does not bruise/bleed easily. Psychiatric/Behavioral: Negative for behavioral problems, dysphoric mood and sleep disturbance. The patient is not nervous/anxious and is not hyperactive. All other systems reviewed and are negative. /74   Pulse 68   Temp 97.8 °F (36.6 °C)   Resp 18   Ht 5' 6\" (1.676 m)   Wt 184 lb (83.5 kg)   SpO2 97%   BMI 29.70 kg/m²     Physical Exam  Vitals and nursing note reviewed. Constitutional:       General: He is not in acute distress. Appearance: Normal appearance. He is well-developed. HENT:      Head: Normocephalic and atraumatic. Right Ear: Hearing, tympanic membrane and external ear normal. No tenderness. No middle ear effusion. Left Ear: Hearing, tympanic membrane and external ear normal. No tenderness. No middle ear effusion. Nose: Nose normal. No congestion or rhinorrhea. Right Turbinates: Not enlarged. Left Turbinates: Not enlarged. Mouth/Throat:      Mouth: Mucous membranes are moist.      Tongue: No lesions. Pharynx: Oropharynx is clear. No oropharyngeal exudate or posterior oropharyngeal erythema. Eyes:      General: No scleral icterus. Conjunctiva/sclera: Conjunctivae normal.      Pupils: Pupils are equal, round, and reactive to light. Neck:      Thyroid: No thyromegaly. Cardiovascular:      Rate and Rhythm: Normal rate and regular rhythm. Heart sounds: Normal heart sounds. No murmur heard. Pulmonary:      Effort: Pulmonary effort is normal. No respiratory distress. Breath sounds: Normal breath sounds. No wheezing or rales. Abdominal:      General: Bowel sounds are normal. There is no distension. Palpations: Abdomen is soft. Tenderness: There is no abdominal tenderness. Musculoskeletal:         General: No tenderness. Normal range of motion. Cervical back: Normal range of motion and neck supple. No rigidity. No muscular tenderness. Lymphadenopathy:      Cervical: No cervical adenopathy. Skin:     General: Skin is warm and dry. Findings: No erythema or rash. Neurological:      General: No focal deficit present. Mental Status: He is alert and oriented to person, place, and time. Cranial Nerves: No cranial nerve deficit. Deep Tendon Reflexes: Reflexes are normal and symmetric. Reflexes normal.   Psychiatric:         Mood and Affect: Mood normal.                                 ASSESSMENT/PLAN:    Patient Active Problem List   Diagnosis    Syncope and collapse    AV block, Mobitz II    Other hyperlipidemia    Head injury       Noa Pate was seen today for wound check. Diagnoses and all orders for this visit:    Bleeding pigmented skin lesion    Dressing changed by me  Has been following with Derm  Was referred to a Derm-Specialist in [place] and is having a bone scan this week    Return if symptoms worsen or fail to improve. I spent 20 minutes with this patient. I spent greater than 50% of the time counseling this patient.         Eugenio Jerry DO  6/7/2022  12:00 PM

## 2022-07-07 ENCOUNTER — OFFICE VISIT (OUTPATIENT)
Dept: FAMILY MEDICINE CLINIC | Age: 87
End: 2022-07-07
Payer: MEDICARE

## 2022-07-07 VITALS
SYSTOLIC BLOOD PRESSURE: 122 MMHG | BODY MASS INDEX: 30.05 KG/M2 | RESPIRATION RATE: 16 BRPM | DIASTOLIC BLOOD PRESSURE: 78 MMHG | WEIGHT: 187 LBS | HEART RATE: 77 BPM | OXYGEN SATURATION: 98 % | HEIGHT: 66 IN

## 2022-07-07 DIAGNOSIS — C44.90 SKIN CANCER: ICD-10-CM

## 2022-07-07 DIAGNOSIS — L81.9 BLEEDING PIGMENTED SKIN LESION: Primary | ICD-10-CM

## 2022-07-07 PROCEDURE — 1123F ACP DISCUSS/DSCN MKR DOCD: CPT | Performed by: FAMILY MEDICINE

## 2022-07-07 PROCEDURE — G8417 CALC BMI ABV UP PARAM F/U: HCPCS | Performed by: FAMILY MEDICINE

## 2022-07-07 PROCEDURE — 99213 OFFICE O/P EST LOW 20 MIN: CPT | Performed by: FAMILY MEDICINE

## 2022-07-07 PROCEDURE — G8427 DOCREV CUR MEDS BY ELIG CLIN: HCPCS | Performed by: FAMILY MEDICINE

## 2022-07-07 PROCEDURE — 1036F TOBACCO NON-USER: CPT | Performed by: FAMILY MEDICINE

## 2022-07-07 ASSESSMENT — ENCOUNTER SYMPTOMS
BLOOD IN STOOL: 0
VOMITING: 0
RHINORRHEA: 0
ABDOMINAL PAIN: 0
NAUSEA: 0
CONSTIPATION: 0
EYE PAIN: 0
SORE THROAT: 0
SHORTNESS OF BREATH: 0
COLOR CHANGE: 0
CHEST TIGHTNESS: 0
EYE ITCHING: 0
DIARRHEA: 0
SINUS PRESSURE: 0
WHEEZING: 0
APNEA: 0
COUGH: 0
BACK PAIN: 0
EYE REDNESS: 0

## 2022-07-07 NOTE — PROGRESS NOTES
Chief Complaint:     Chief Complaint   Patient presents with    Other     wound on left arm needs rebandaged. Other  Pertinent negatives include no abdominal pain, arthralgias, chest pain, congestion, coughing, fatigue, fever, headaches, myalgias, nausea, neck pain, numbness, rash, sore throat, vomiting or weakness. Wound Check  He was originally treated 5 to 10 days ago. Previous treatment included wound cleansing or irrigation. His temperature was unmeasured prior to arrival. There has been bloody discharge from the wound. The redness has not changed. The swelling has not changed. There is no pain present. He has no difficulty moving the affected extremity or digit. Patient Active Problem List   Diagnosis    Syncope and collapse    AV block, Mobitz II    Other hyperlipidemia    Head injury       Past Medical History:   Diagnosis Date    Cancer ([facility] [address] 1996    PROSTATE    Hyperlipidemia     Urethral stricture        Past Surgical History:   Procedure Laterality Date    CYSTOSCOPY  1996    SEED IMPLANTATION    CYSTOSCOPY  07/10/2012    retrograde pyelograms urethral dilatation       Current Outpatient Medications   Medication Sig Dispense Refill    atorvastatin (LIPITOR) 10 MG tablet Take 1 tablet by mouth every other day NIGHTLY 90 tablet 1     No current facility-administered medications for this visit.        No Known Allergies    Social History     Socioeconomic History    Marital status:      Spouse name: None    Number of children: None    Years of education: None    Highest education level: None   Occupational History    None   Tobacco Use    Smoking status: Never Smoker    Smokeless tobacco: Never Used   Vaping Use    Vaping Use: Never used   Substance and Sexual Activity    Alcohol use: No    Drug use: No    Sexual activity: None   Other Topics Concern    None   Social History Narrative    None     Social Determinants of Health     Financial Resource Strain:     Difficulty of Paying Living Expenses: Not on file   Food Insecurity:     Worried About Running Out of Food in the Last Year: Not on file    Ran Out of Food in the Last Year: Not on file   Transportation Needs:     Lack of Transportation (Medical): Not on file    Lack of Transportation (Non-Medical): Not on file   Physical Activity:     Days of Exercise per Week: Not on file    Minutes of Exercise per Session: Not on file   Stress:     Feeling of Stress : Not on file   Social Connections:     Frequency of Communication with Friends and Family: Not on file    Frequency of Social Gatherings with Friends and Family: Not on file    Attends Orthodoxy Services: Not on file    Active Member of [employer] or Organizations: Not on file    Attends Club or Organization Meetings: Not on file    Marital Status: Not on file   Intimate Partner Violence:     Fear of Current or Ex-Partner: Not on file    Emotionally Abused: Not on file    Physically Abused: Not on file    Sexually Abused: Not on file   Housing Stability:     Unable to Pay for Housing in the Last Year: Not on file    Number of [place] in the Last Year: Not on file    Unstable Housing in the Last Year: Not on file       No family history on file. Review of Systems   Constitutional: Negative for activity change, appetite change, fatigue and fever. HENT: Negative for congestion, ear pain, hearing loss, nosebleeds, rhinorrhea, sinus pressure and sore throat. Eyes: Negative for pain, redness, itching and visual disturbance. Respiratory: Negative for apnea, cough, chest tightness, shortness of breath and wheezing. Cardiovascular: Negative for chest pain, palpitations and leg swelling. Gastrointestinal: Negative for abdominal pain, blood in stool, constipation, diarrhea, nausea and vomiting. Endocrine: Negative. Genitourinary: Negative for decreased urine volume, difficulty urinating, dysuria, frequency, hematuria and urgency. Musculoskeletal: Negative for arthralgias, back pain, gait problem, myalgias and neck pain. Skin: Negative for color change and rash. Allergic/Immunologic: Negative for environmental allergies and food allergies. Neurological: Negative for dizziness, weakness, light-headedness, numbness and headaches. Hematological: Negative for adenopathy. Does not bruise/bleed easily. Psychiatric/Behavioral: Negative for behavioral problems, dysphoric mood and sleep disturbance. The patient is not nervous/anxious and is not hyperactive. All other systems reviewed and are negative. /78   Pulse 77   Resp 16   Ht 5' 6\" (1.676 m)   Wt 187 lb (84.8 kg)   SpO2 98%   BMI 30.18 kg/m²     Physical Exam  Vitals and nursing note reviewed. Constitutional:       General: He is not in acute distress. Appearance: Normal appearance. He is well-developed. HENT:      Head: Normocephalic and atraumatic. Right Ear: Hearing, tympanic membrane and external ear normal. No tenderness. No middle ear effusion. Left Ear: Hearing, tympanic membrane and external ear normal. No tenderness. No middle ear effusion. Nose: Nose normal. No congestion or rhinorrhea. Right Turbinates: Not enlarged. Left Turbinates: Not enlarged. Mouth/Throat:      Mouth: Mucous membranes are moist.      Tongue: No lesions. Pharynx: Oropharynx is clear. No oropharyngeal exudate or posterior oropharyngeal erythema. Eyes:      General: No scleral icterus. Conjunctiva/sclera: Conjunctivae normal.      Pupils: Pupils are equal, round, and reactive to light. Neck:      Thyroid: No thyromegaly. Cardiovascular:      Rate and Rhythm: Normal rate and regular rhythm. Heart sounds: Normal heart sounds. No murmur heard. Pulmonary:      Effort: Pulmonary effort is normal. No respiratory distress. Breath sounds: Normal breath sounds. No wheezing or rales.    Abdominal:      General: Bowel sounds are normal. There is no distension. Palpations: Abdomen is soft. Tenderness: There is no abdominal tenderness. Musculoskeletal:         General: No tenderness. Normal range of motion. Cervical back: Normal range of motion and neck supple. No rigidity. No muscular tenderness. Lymphadenopathy:      Cervical: No cervical adenopathy. Skin:     General: Skin is warm and dry. Findings: No erythema or rash. Neurological:      General: No focal deficit present. Mental Status: He is alert and oriented to person, place, and time. Cranial Nerves: No cranial nerve deficit. Deep Tendon Reflexes: Reflexes are normal and symmetric. Reflexes normal.   Psychiatric:         Mood and Affect: Mood normal.                                 ASSESSMENT/PLAN:    Patient Active Problem List   Diagnosis    Syncope and collapse    AV block, Mobitz II    Other hyperlipidemia    Head injury       Berry Stratton was seen today for other. Diagnoses and all orders for this visit:    Bleeding pigmented skin lesion  -     [address] [place] [address] Drive    Skin cancer          Return if symptoms worsen or fail to improve. I spent 20 minutes with this patient. I spent greater than 50% of the time counseling this patient.         Cata Mccracken DO  7/7/2022  11:05 AM

## 2022-07-10 ENCOUNTER — APPOINTMENT (OUTPATIENT)
Dept: GENERAL RADIOLOGY | Age: 87
End: 2022-07-10
Payer: MEDICARE

## 2022-07-10 ENCOUNTER — HOSPITAL ENCOUNTER (EMERGENCY)
Age: 87
Discharge: HOME | End: 2022-07-10
Attending: EMERGENCY MEDICINE
Payer: MEDICARE

## 2022-07-10 VITALS
SYSTOLIC BLOOD PRESSURE: 112 MMHG | HEIGHT: 67 IN | OXYGEN SATURATION: 100 % | WEIGHT: 175 LBS | RESPIRATION RATE: 16 BRPM | TEMPERATURE: 98.2 F | DIASTOLIC BLOOD PRESSURE: 77 MMHG | HEART RATE: 71 BPM | BODY MASS INDEX: 27.47 KG/M2

## 2022-07-10 DIAGNOSIS — C44.609: Primary | ICD-10-CM

## 2022-07-10 PROCEDURE — 99283 EMERGENCY DEPT VISIT LOW MDM: CPT

## 2022-07-10 PROCEDURE — 73090 X-RAY EXAM OF FOREARM: CPT

## 2022-07-10 NOTE — ED NOTES
Dressing change, skin issue chronic, don't do all triage labs, just cbc and coags     Lars Felty, MD  07/10/22 [id number]

## 2022-07-10 NOTE — ED PROVIDER NOTES
HPI:  7/10/22,   Time: 3:28 PM EDT       Carol Ann Watson is a 80 y.o. male presenting to the ED for left forearm tumor/dressing change, beginning weeks ago. The complaint has been persistent, mild in severity, and worsened by nothing. Known tumor for months, some type of skin ca, to be seen at [address] [place] [address] 7/19. Having dressing changes by pcp due wife unable to do. States was too tight from change 2 days ago, and caused arm to swell, so pt loosened it, now has fallen off. Swelling has resolved. Here for changes of dressing. No fever/chills/sweats/n/v/d/abd pain. Tumor has been bleeding for months as well intermittently    Review of Systems:   Pertinent positives and negatives are stated within HPI, all other systems reviewed and are negative.          --------------------------------------------- PAST HISTORY ---------------------------------------------  Past Medical History:  has a past medical history of Cancer ([facility] [address] Hyperlipidemia, and Urethral stricture. Past Surgical History:  has a past surgical history that includes Cystocopy (1996) and Cystocopy (07/10/2012). Social History:  reports that he has never smoked. He has never used smokeless tobacco. He reports that he does not drink alcohol and does not use drugs. Family History: family history is not on file. The patients home medications have been reviewed. Allergies: Patient has no known allergies. ---------------------------------------------------PHYSICAL EXAM--------------------------------------    Constitutional/General: Alert and oriented x3, well appearing, non toxic in NAD  Head: Normocephalic and atraumatic  Eyes: PERRL, EOMI, conjunctive normal, sclera non icteric  Mouth: Oropharynx clear, handling secretions,   Neck: Supple, full ROM,  Respiratory: . Not in respiratory distress  Cardiovascular:  . 2+ distal pulses     Musculoskeletal: Moves all extremities x 4.  Warm and well perfused,large mass 6 cm left forearm, dried bleeding with mild oozing, surrounding echymosis, not foul smelling, no purulence Capillary refill <3 seconds    Lymphatic: no lymphadenopathy noted  Neurologic: GCS 15, no focal deficits,   Psychiatric: Normal Affect    -------------------------------------------------- RESULTS -------------------------------------------------  I have personally reviewed all laboratory and imaging results for this patient. Results are listed below. LABS:  No results found for this visit on 07/10/22. RADIOLOGY:  Interpreted by Radiologist.  XR RADIUS ULNA LEFT (2 VIEWS)   Final Result   Proximal left forearm wound dressing in place. No underlying osteomyelitis   or bony abnormality. The more distal left forearm is unremarkable. EKG:  This EKG is signed and interpreted by the EP. Time:   Rate:   Rhythm:   Interpretation:   Comparison:       ------------------------- NURSING NOTES AND VITALS REVIEWED ---------------------------   The nursing notes within the ED encounter and vital signs as below have been reviewed by myself. /77   Pulse 71   Temp 98.2 °F (36.8 °C)   Resp 16   Ht 5' 7\" (1.702 m)   Wt 175 lb (79.4 kg)   SpO2 100%   BMI 27.41 kg/m²   Oxygen Saturation Interpretation: Normal    The patients available past medical records and past encounters were reviewed. ------------------------------ ED COURSE/MEDICAL DECISION MAKING----------------------  Medications - No data to display      ED COURSE:       Medical Decision Making:    Vss, known issue for months, hasn't changed, pt here for dressing change, clinically not infected, swelling was due to tight dressing, no need for labs at this time, change dressing and dc        This patient's ED course included: a personal history and physicial examination    This patient has remained hemodynamically stable during their ED course. Re-Evaluations:             Re-evaluation.   Patients symptoms show no change          Counseling: The emergency provider has spoken with the patient and discussed todays results, in addition to providing specific details for the plan of care and counseling regarding the diagnosis and prognosis. Questions are answered at this time and they are agreeable with the plan.       --------------------------------- IMPRESSION AND DISPOSITION ---------------------------------    IMPRESSION  1. 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  Every effort was made to ensure accuracy; however, inadvertent computerized transcription errors may be present        Soha Fleming MD  07/10/22 [id number]

## 2022-07-11 ENCOUNTER — OFFICE VISIT (OUTPATIENT)
Dept: FAMILY MEDICINE CLINIC | Age: 87
End: 2022-07-11
Payer: MEDICARE

## 2022-07-11 VITALS
SYSTOLIC BLOOD PRESSURE: 130 MMHG | HEART RATE: 75 BPM | DIASTOLIC BLOOD PRESSURE: 76 MMHG | HEIGHT: 67 IN | WEIGHT: 175 LBS | RESPIRATION RATE: 16 BRPM | OXYGEN SATURATION: 98 % | BODY MASS INDEX: 27.47 KG/M2

## 2022-07-11 DIAGNOSIS — C44.90 SKIN CANCER: ICD-10-CM

## 2022-07-11 DIAGNOSIS — L81.9 BLEEDING PIGMENTED SKIN LESION: Primary | ICD-10-CM

## 2022-07-11 PROCEDURE — G8417 CALC BMI ABV UP PARAM F/U: HCPCS | Performed by: FAMILY MEDICINE

## 2022-07-11 PROCEDURE — 1036F TOBACCO NON-USER: CPT | Performed by: FAMILY MEDICINE

## 2022-07-11 PROCEDURE — 1123F ACP DISCUSS/DSCN MKR DOCD: CPT | Performed by: FAMILY MEDICINE

## 2022-07-11 PROCEDURE — G8427 DOCREV CUR MEDS BY ELIG CLIN: HCPCS | Performed by: FAMILY MEDICINE

## 2022-07-11 PROCEDURE — 99213 OFFICE O/P EST LOW 20 MIN: CPT | Performed by: FAMILY MEDICINE

## 2022-07-11 ASSESSMENT — ENCOUNTER SYMPTOMS
RHINORRHEA: 0
EYE REDNESS: 0
EYE PAIN: 0
NAUSEA: 0
VOMITING: 0
COLOR CHANGE: 0
COUGH: 0
DIARRHEA: 0
APNEA: 0
ABDOMINAL PAIN: 0
CONSTIPATION: 0
EYE ITCHING: 0
SORE THROAT: 0
SINUS PRESSURE: 0
CHEST TIGHTNESS: 0
SHORTNESS OF BREATH: 0
WHEEZING: 0
BACK PAIN: 0
BLOOD IN STOOL: 0

## 2022-07-11 NOTE — PROGRESS NOTES
Chief Complaint:     Chief Complaint   Patient presents with    Wound Check     left arm          Wound Check  He was originally treated 5 to 10 days ago. Previous treatment included wound cleansing or irrigation. His temperature was unmeasured prior to arrival. There has been bloody discharge from the wound. The redness has not changed. The swelling has not changed. There is no pain present. He has no difficulty moving the affected extremity or digit. Other  Pertinent negatives include no abdominal pain, arthralgias, chest pain, congestion, coughing, fatigue, fever, headaches, myalgias, nausea, neck pain, numbness, rash, sore throat, vomiting or weakness. Patient Active Problem List   Diagnosis    Syncope and collapse    AV block, Mobitz II    Other hyperlipidemia    Head injury       Past Medical History:   Diagnosis Date    Cancer ([facility] [address] 1996    PROSTATE    Hyperlipidemia     Urethral stricture        Past Surgical History:   Procedure Laterality Date    CYSTOSCOPY  1996    SEED IMPLANTATION    CYSTOSCOPY  07/10/2012    retrograde pyelograms urethral dilatation       Current Outpatient Medications   Medication Sig Dispense Refill    atorvastatin (LIPITOR) 10 MG tablet Take 1 tablet by mouth every other day NIGHTLY 90 tablet 1     No current facility-administered medications for this visit.        No Known Allergies    Social History     Socioeconomic History    Marital status:      Spouse name: None    Number of children: None    Years of education: None    Highest education level: None   Occupational History    None   Tobacco Use    Smoking status: Never Smoker    Smokeless tobacco: Never Used   Vaping Use    Vaping Use: Never used   Substance and Sexual Activity    Alcohol use: No    Drug use: No    Sexual activity: None   Other Topics Concern    None   Social History Narrative    None     Social Determinants of Health     Financial Resource Strain:     Difficulty of Paying Negative for arthralgias, back pain, gait problem, myalgias and neck pain. Skin: Negative for color change and rash. Allergic/Immunologic: Negative for environmental allergies and food allergies. Neurological: Negative for dizziness, weakness, light-headedness, numbness and headaches. Hematological: Negative for adenopathy. Does not bruise/bleed easily. Psychiatric/Behavioral: Negative for behavioral problems, dysphoric mood and sleep disturbance. The patient is not nervous/anxious and is not hyperactive. All other systems reviewed and are negative. /76   Pulse 75   Resp 16   Ht 5' 7\" (1.702 m)   Wt 175 lb (79.4 kg)   SpO2 98%   BMI 27.41 kg/m²     Physical Exam  Vitals and nursing note reviewed. Constitutional:       General: He is not in acute distress. Appearance: Normal appearance. He is well-developed. HENT:      Head: Normocephalic and atraumatic. Right Ear: Hearing, tympanic membrane and external ear normal. No tenderness. No middle ear effusion. Left Ear: Hearing, tympanic membrane and external ear normal. No tenderness. No middle ear effusion. Nose: Nose normal. No congestion or rhinorrhea. Right Turbinates: Not enlarged. Left Turbinates: Not enlarged. Mouth/Throat:      Mouth: Mucous membranes are moist.      Tongue: No lesions. Pharynx: Oropharynx is clear. No oropharyngeal exudate or posterior oropharyngeal erythema. Eyes:      General: No scleral icterus. Conjunctiva/sclera: Conjunctivae normal.      Pupils: Pupils are equal, round, and reactive to light. Neck:      Thyroid: No thyromegaly. Cardiovascular:      Rate and Rhythm: Normal rate and regular rhythm. Heart sounds: Normal heart sounds. No murmur heard. Pulmonary:      Effort: Pulmonary effort is normal. No respiratory distress. Breath sounds: Normal breath sounds. No wheezing or rales.    Abdominal:      General: Bowel sounds are normal. There is no distension. Palpations: Abdomen is soft. Tenderness: There is no abdominal tenderness. Musculoskeletal:         General: No tenderness. Normal range of motion. Cervical back: Normal range of motion and neck supple. No rigidity. No muscular tenderness. Lymphadenopathy:      Cervical: No cervical adenopathy. Skin:     General: Skin is warm and dry. Findings: No erythema or rash. Neurological:      General: No focal deficit present. Mental Status: He is alert and oriented to person, place, and time. Cranial Nerves: No cranial nerve deficit. Deep Tendon Reflexes: Reflexes are normal and symmetric. Reflexes normal.   Psychiatric:         Mood and Affect: Mood normal.                                 ASSESSMENT/PLAN:    Patient Active Problem List   Diagnosis    Syncope and collapse    AV block, Mobitz II    Other hyperlipidemia    Head injury       Mika Law was seen today for wound check. Diagnoses and all orders for this visit:    Bleeding pigmented skin lesion  -     [id number] [facility]      Set up Wound Care  Set up [facility] so he does not have to keep coming in to the office for dressing changes due to difficulty with transportation, mobility, etc    Return if symptoms worsen or fail to improve. I spent 20 minutes with this patient. I spent greater than 50% of the time counseling this patient.         Wilver Botello DO  7/11/2022  11:51 AM

## 2022-07-14 ENCOUNTER — TELEPHONE (OUTPATIENT)
Dept: PRIMARY CARE CLINIC | Age: 87
End: 2022-07-14

## 2022-07-14 NOTE — TELEPHONE ENCOUNTER
Try again to reach Home Care  He can take bandage off at home and replace himself if possible  Can schedule appt here if needed

## 2022-07-14 NOTE — TELEPHONE ENCOUNTER
Called homecare, they have referral but it has to be reviewed by the manager before they can get him scheduled.

## 2022-07-14 NOTE — TELEPHONE ENCOUNTER
Wife calling they have not heard from home care to come change his bandage. I called [facility] home care myself was unable to reach anyone and left a message. Wife is concerned bandage has not been changed since Monday in office.

## 2022-07-15 ENCOUNTER — OFFICE VISIT (OUTPATIENT)
Dept: FAMILY MEDICINE CLINIC | Age: 87
End: 2022-07-15
Payer: MEDICARE

## 2022-07-15 VITALS
HEART RATE: 74 BPM | SYSTOLIC BLOOD PRESSURE: 130 MMHG | BODY MASS INDEX: 27.47 KG/M2 | HEIGHT: 67 IN | TEMPERATURE: 98.1 F | DIASTOLIC BLOOD PRESSURE: 72 MMHG | WEIGHT: 175 LBS

## 2022-07-15 DIAGNOSIS — C44.90 SKIN CANCER: ICD-10-CM

## 2022-07-15 DIAGNOSIS — L81.9 BLEEDING PIGMENTED SKIN LESION: Primary | ICD-10-CM

## 2022-07-15 PROCEDURE — G8417 CALC BMI ABV UP PARAM F/U: HCPCS | Performed by: FAMILY MEDICINE

## 2022-07-15 PROCEDURE — 1036F TOBACCO NON-USER: CPT | Performed by: FAMILY MEDICINE

## 2022-07-15 PROCEDURE — 1123F ACP DISCUSS/DSCN MKR DOCD: CPT | Performed by: FAMILY MEDICINE

## 2022-07-15 PROCEDURE — G8427 DOCREV CUR MEDS BY ELIG CLIN: HCPCS | Performed by: FAMILY MEDICINE

## 2022-07-15 PROCEDURE — 99212 OFFICE O/P EST SF 10 MIN: CPT | Performed by: FAMILY MEDICINE

## 2022-07-15 ASSESSMENT — ENCOUNTER SYMPTOMS
EYE ITCHING: 0
DIARRHEA: 0
APNEA: 0
COLOR CHANGE: 0
CONSTIPATION: 0
WHEEZING: 0
BACK PAIN: 0
ABDOMINAL PAIN: 0
NAUSEA: 0
SINUS PRESSURE: 0
CHEST TIGHTNESS: 0
COUGH: 0
RHINORRHEA: 0
BLOOD IN STOOL: 0
SHORTNESS OF BREATH: 0
SORE THROAT: 0
EYE REDNESS: 0
EYE PAIN: 0
VOMITING: 0

## 2022-07-15 NOTE — PROGRESS NOTES
Chief Complaint:     Chief Complaint   Patient presents with    Wound Check     Needs bandage changed  Waiting on home care to come out but they are short staffed         Wound Check  He was originally treated more than 14 days ago. Previous treatment included wound cleansing or irrigation. His temperature was unmeasured prior to arrival. There has been bloody discharge from the wound. The redness has not changed. The swelling has not changed. There is no pain present. He has no difficulty moving the affected extremity or digit. Patient Active Problem List   Diagnosis    Syncope and collapse    AV block, Mobitz II    Other hyperlipidemia    Head injury       Past Medical History:   Diagnosis Date    Cancer ([facility] [address] 1996    PROSTATE    Hyperlipidemia     Urethral stricture        Past Surgical History:   Procedure Laterality Date    CYSTOSCOPY  1996    SEED IMPLANTATION    CYSTOSCOPY  07/10/2012    retrograde pyelograms urethral dilatation       Current Outpatient Medications   Medication Sig Dispense Refill    atorvastatin (LIPITOR) 10 MG tablet Take 1 tablet by mouth every other day NIGHTLY 90 tablet 1     No current facility-administered medications for this visit. No Known Allergies    Social History     Socioeconomic History    Marital status:      Spouse name: None    Number of children: None    Years of education: None    Highest education level: None   Tobacco Use    Smoking status: Never    Smokeless tobacco: Never   Vaping Use    Vaping Use: Never used   Substance and Sexual Activity    Alcohol use: No    Drug use: No       History reviewed. No pertinent family history. Review of Systems   Constitutional:  Negative for activity change, appetite change, fatigue and fever. HENT:  Negative for congestion, ear pain, hearing loss, nosebleeds, rhinorrhea, sinus pressure and sore throat. Eyes:  Negative for pain, redness, itching and visual disturbance.    Respiratory:  Negative for apnea, cough, chest tightness, shortness of breath and wheezing. Cardiovascular:  Negative for chest pain, palpitations and leg swelling. Gastrointestinal:  Negative for abdominal pain, blood in stool, constipation, diarrhea, nausea and vomiting. Endocrine: Negative. Genitourinary:  Negative for decreased urine volume, difficulty urinating, dysuria, frequency, hematuria and urgency. Musculoskeletal:  Negative for arthralgias, back pain, gait problem, myalgias and neck pain. Skin:  Negative for color change and rash. Allergic/Immunologic: Negative for environmental allergies and food allergies. Neurological:  Negative for dizziness, weakness, light-headedness, numbness and headaches. Hematological:  Negative for adenopathy. Does not bruise/bleed easily. Psychiatric/Behavioral:  Negative for behavioral problems, dysphoric mood and sleep disturbance. The patient is not nervous/anxious and is not hyperactive. All other systems reviewed and are negative. /72   Pulse 74   Temp 98.1 °F (36.7 °C)   Ht 5' 7\" (1.702 m)   Wt 175 lb (79.4 kg)   BMI 27.41 kg/m²     Physical Exam  Vitals and nursing note reviewed. Constitutional:       General: He is not in acute distress. Appearance: Normal appearance. He is well-developed. HENT:      Head: Normocephalic and atraumatic. Right Ear: Hearing, tympanic membrane and external ear normal. No tenderness. No middle ear effusion. Left Ear: Hearing, tympanic membrane and external ear normal. No tenderness. No middle ear effusion. Nose: Nose normal. No congestion or rhinorrhea. Right Turbinates: Not enlarged. Left Turbinates: Not enlarged. Mouth/Throat:      Mouth: Mucous membranes are moist.      Tongue: No lesions. Pharynx: Oropharynx is clear. No oropharyngeal exudate or posterior oropharyngeal erythema. Eyes:      General: No scleral icterus.      Conjunctiva/sclera: Conjunctivae normal.      Pupils: Pupils are equal, round, and reactive to light. Neck:      Thyroid: No thyromegaly. Cardiovascular:      Rate and Rhythm: Normal rate and regular rhythm. Heart sounds: Normal heart sounds. No murmur heard. Pulmonary:      Effort: Pulmonary effort is normal. No respiratory distress. Breath sounds: Normal breath sounds. No wheezing or rales. Abdominal:      General: Bowel sounds are normal. There is no distension. Palpations: Abdomen is soft. Tenderness: There is no abdominal tenderness. Musculoskeletal:         General: No tenderness. Normal range of motion. Cervical back: Normal range of motion and neck supple. No rigidity. No muscular tenderness. Lymphadenopathy:      Cervical: No cervical adenopathy. Skin:     General: Skin is warm and dry. Findings: No erythema or rash. Neurological:      General: No focal deficit present. Mental Status: He is alert and oriented to person, place, and time. Cranial Nerves: No cranial nerve deficit. Deep Tendon Reflexes: Reflexes are normal and symmetric. Reflexes normal.   Psychiatric:         Mood and Affect: Mood normal.                               ASSESSMENT/PLAN:    Patient Active Problem List   Diagnosis    Syncope and collapse    AV block, Mobitz II    Other hyperlipidemia    Head injury       Jong Madera was seen today for wound check. Diagnoses and all orders for this visit:    Bleeding pigmented skin lesion    Skin cancer    Dressing changed today  Has appt next week with specialist in [address] can come out soon for dressing changes    Return if symptoms worsen or fail to improve. I spent 15 minutes with this patient. I spent greater than 50% of the time counseling this patient.         Brittney Cherry DO  7/15/2022  2:35 PM

## 2022-07-18 NOTE — TELEPHONE ENCOUNTER
John calling from [address] they can se patient is Thursday.  Needing a verbal okay from you if agreeable. [phone]

## 2022-08-15 LAB
ALBUMIN SERPL-MCNC: 3.7 G/DL (ref 3.5–5.2)
ALP BLD-CCNC: 92 U/L (ref 40–129)
ALT SERPL-CCNC: 13 U/L (ref 0–40)
ANION GAP SERPL CALCULATED.3IONS-SCNC: 8 MMOL/L (ref 7–16)
AST SERPL-CCNC: 21 U/L (ref 0–39)
BASOPHILS ABSOLUTE: 0.04 E9/L (ref 0–0.2)
BASOPHILS RELATIVE PERCENT: 0.7 % (ref 0–2)
BILIRUB SERPL-MCNC: 0.7 MG/DL (ref 0–1.2)
BUN BLDV-MCNC: 16 MG/DL (ref 6–23)
CALCIUM SERPL-MCNC: 9.4 MG/DL (ref 8.6–10.2)
CHLORIDE BLD-SCNC: 107 MMOL/L (ref 98–107)
CO2: 26 MMOL/L (ref 22–29)
CORTISOL TOTAL: 12.4 MCG/DL (ref 2.68–18.4)
CREAT SERPL-MCNC: 1.1 MG/DL (ref 0.7–1.2)
EOSINOPHILS ABSOLUTE: 0.34 E9/L (ref 0.05–0.5)
EOSINOPHILS RELATIVE PERCENT: 5.9 % (ref 0–6)
GFR AFRICAN AMERICAN: >60
GFR NON-AFRICAN AMERICAN: >60 ML/MIN/1.73
GLUCOSE BLD-MCNC: 87 MG/DL (ref 74–99)
HCT VFR BLD CALC: 38.1 % (ref 37–54)
HEMOGLOBIN: 12.6 G/DL (ref 12.5–16.5)
IMMATURE GRANULOCYTES #: 0.02 E9/L
IMMATURE GRANULOCYTES %: 0.3 % (ref 0–5)
LACTATE DEHYDROGENASE: 275 U/L (ref 135–225)
LYMPHOCYTES ABSOLUTE: 1.44 E9/L (ref 1.5–4)
LYMPHOCYTES RELATIVE PERCENT: 25.2 % (ref 20–42)
MCH RBC QN AUTO: 31 PG (ref 26–35)
MCHC RBC AUTO-ENTMCNC: 33.1 % (ref 32–34.5)
MCV RBC AUTO: 93.8 FL (ref 80–99.9)
MONOCYTES ABSOLUTE: 0.62 E9/L (ref 0.1–0.95)
MONOCYTES RELATIVE PERCENT: 10.8 % (ref 2–12)
NEUTROPHILS ABSOLUTE: 3.26 E9/L (ref 1.8–7.3)
NEUTROPHILS RELATIVE PERCENT: 57.1 % (ref 43–80)
PDW BLD-RTO: 13.2 FL (ref 11.5–15)
PLATELET # BLD: 304 E9/L (ref 130–450)
PMV BLD AUTO: 9.9 FL (ref 7–12)
POTASSIUM SERPL-SCNC: 4.5 MMOL/L (ref 3.5–5)
RBC # BLD: 4.06 E12/L (ref 3.8–5.8)
SODIUM BLD-SCNC: 141 MMOL/L (ref 132–146)
TOTAL PROTEIN: 6.6 G/DL (ref 6.4–8.3)
TSH SERPL DL<=0.05 MIU/L-ACNC: 2.04 UIU/ML (ref 0.27–4.2)
WBC # BLD: 5.7 E9/L (ref 4.5–11.5)

## 2022-08-17 LAB — ADRENOCORTICOTROPIC HORMONE: 39.4 PG/ML (ref 7.2–63.3)

## 2022-09-06 LAB
ALBUMIN SERPL-MCNC: 3.6 G/DL (ref 3.5–5.2)
ALP BLD-CCNC: 112 U/L (ref 40–129)
ALT SERPL-CCNC: 11 U/L (ref 0–40)
ANION GAP SERPL CALCULATED.3IONS-SCNC: 11 MMOL/L (ref 7–16)
AST SERPL-CCNC: 20 U/L (ref 0–39)
BASOPHILS ABSOLUTE: 0.05 E9/L (ref 0–0.2)
BASOPHILS RELATIVE PERCENT: 0.7 % (ref 0–2)
BILIRUB SERPL-MCNC: 1.1 MG/DL (ref 0–1.2)
BUN BLDV-MCNC: 13 MG/DL (ref 6–23)
CALCIUM SERPL-MCNC: 9.6 MG/DL (ref 8.6–10.2)
CHLORIDE BLD-SCNC: 103 MMOL/L (ref 98–107)
CO2: 24 MMOL/L (ref 22–29)
CORTISOL TOTAL: 12.68 MCG/DL (ref 2.68–18.4)
CREAT SERPL-MCNC: 1.1 MG/DL (ref 0.7–1.2)
EOSINOPHILS ABSOLUTE: 0.35 E9/L (ref 0.05–0.5)
EOSINOPHILS RELATIVE PERCENT: 5.2 % (ref 0–6)
GFR AFRICAN AMERICAN: >60
GFR NON-AFRICAN AMERICAN: >60 ML/MIN/1.73
GLUCOSE BLD-MCNC: 99 MG/DL (ref 74–99)
HCT VFR BLD CALC: 39.6 % (ref 37–54)
HEMOGLOBIN: 13.1 G/DL (ref 12.5–16.5)
IMMATURE GRANULOCYTES #: 0.03 E9/L
IMMATURE GRANULOCYTES %: 0.4 % (ref 0–5)
LYMPHOCYTES ABSOLUTE: 1.14 E9/L (ref 1.5–4)
LYMPHOCYTES RELATIVE PERCENT: 16.9 % (ref 20–42)
MCH RBC QN AUTO: 31.4 PG (ref 26–35)
MCHC RBC AUTO-ENTMCNC: 33.1 % (ref 32–34.5)
MCV RBC AUTO: 95 FL (ref 80–99.9)
MONOCYTES ABSOLUTE: 0.57 E9/L (ref 0.1–0.95)
MONOCYTES RELATIVE PERCENT: 8.5 % (ref 2–12)
NEUTROPHILS ABSOLUTE: 4.59 E9/L (ref 1.8–7.3)
NEUTROPHILS RELATIVE PERCENT: 68.3 % (ref 43–80)
PDW BLD-RTO: 12.8 FL (ref 11.5–15)
PLATELET # BLD: 363 E9/L (ref 130–450)
PMV BLD AUTO: 9.8 FL (ref 7–12)
POTASSIUM SERPL-SCNC: 4.3 MMOL/L (ref 3.5–5)
RBC # BLD: 4.17 E12/L (ref 3.8–5.8)
SODIUM BLD-SCNC: 138 MMOL/L (ref 132–146)
T4 FREE: 1.13 NG/DL (ref 0.93–1.7)
TOTAL PROTEIN: 6.9 G/DL (ref 6.4–8.3)
TSH SERPL DL<=0.05 MIU/L-ACNC: 1.39 UIU/ML (ref 0.27–4.2)
WBC # BLD: 6.7 E9/L (ref 4.5–11.5)

## 2022-09-09 LAB
LACTATE DEHYDROGENASE: 339 U/L (ref 105–230)
LD ISOENZYME 1: 14 % (ref 14–27)
LD ISOENZYME 2: 39 % (ref 29–42)
LD ISOENZYME 3: 31 % (ref 18–30)
LD ISOENZYME 4: 10 % (ref 8–15)
LD ISOENZYME 5: 6 % (ref 6–23)

## 2022-09-26 LAB
ALBUMIN SERPL-MCNC: 3.6 G/DL (ref 3.5–5.2)
ALP BLD-CCNC: 102 U/L (ref 40–129)
ALT SERPL-CCNC: 10 U/L (ref 0–40)
ANION GAP SERPL CALCULATED.3IONS-SCNC: 9 MMOL/L (ref 7–16)
AST SERPL-CCNC: 16 U/L (ref 0–39)
BILIRUB SERPL-MCNC: 0.8 MG/DL (ref 0–1.2)
BUN BLDV-MCNC: 16 MG/DL (ref 6–23)
CALCIUM SERPL-MCNC: 9.7 MG/DL (ref 8.6–10.2)
CHLORIDE BLD-SCNC: 102 MMOL/L (ref 98–107)
CO2: 27 MMOL/L (ref 22–29)
CORTISOL TOTAL: 11.08 MCG/DL (ref 2.68–18.4)
CREAT SERPL-MCNC: 1.2 MG/DL (ref 0.7–1.2)
GFR AFRICAN AMERICAN: >60
GFR NON-AFRICAN AMERICAN: 57 ML/MIN/1.73
GLUCOSE BLD-MCNC: 93 MG/DL (ref 74–99)
POTASSIUM SERPL-SCNC: 4.4 MMOL/L (ref 3.5–5)
SODIUM BLD-SCNC: 138 MMOL/L (ref 132–146)
T4 TOTAL: 8.1 MCG/DL (ref 4.5–11.7)
TOTAL PROTEIN: 7.4 G/DL (ref 6.4–8.3)
TSH SERPL DL<=0.05 MIU/L-ACNC: 2.09 UIU/ML (ref 0.27–4.2)

## 2022-09-27 LAB
BASOPHILS ABSOLUTE: 0.07 E9/L (ref 0–0.2)
BASOPHILS RELATIVE PERCENT: 1.3 % (ref 0–2)
EOSINOPHILS ABSOLUTE: 0.24 E9/L (ref 0.05–0.5)
EOSINOPHILS RELATIVE PERCENT: 4.3 % (ref 0–6)
HCT VFR BLD CALC: 39.2 % (ref 37–54)
HEMOGLOBIN: 12.6 G/DL (ref 12.5–16.5)
IMMATURE GRANULOCYTES #: 0.02 E9/L
IMMATURE GRANULOCYTES %: 0.4 % (ref 0–5)
LACTATE DEHYDROGENASE: 174 U/L (ref 135–225)
LYMPHOCYTES ABSOLUTE: 1.15 E9/L (ref 1.5–4)
LYMPHOCYTES RELATIVE PERCENT: 20.8 % (ref 20–42)
MCH RBC QN AUTO: 30.2 PG (ref 26–35)
MCHC RBC AUTO-ENTMCNC: 32.1 % (ref 32–34.5)
MCV RBC AUTO: 94 FL (ref 80–99.9)
MONOCYTES ABSOLUTE: 0.54 E9/L (ref 0.1–0.95)
MONOCYTES RELATIVE PERCENT: 9.8 % (ref 2–12)
NEUTROPHILS ABSOLUTE: 3.5 E9/L (ref 1.8–7.3)
NEUTROPHILS RELATIVE PERCENT: 63.4 % (ref 43–80)
PDW BLD-RTO: 12.5 FL (ref 11.5–15)
PLATELET # BLD: 407 E9/L (ref 130–450)
PMV BLD AUTO: 9.4 FL (ref 7–12)
RBC # BLD: 4.17 E12/L (ref 3.8–5.8)
WBC # BLD: 5.5 E9/L (ref 4.5–11.5)

## 2022-10-14 ENCOUNTER — OFFICE VISIT (OUTPATIENT)
Dept: PRIMARY CARE CLINIC | Age: 87
End: 2022-10-14
Payer: MEDICARE

## 2022-10-14 VITALS
WEIGHT: 174 LBS | HEIGHT: 67 IN | HEART RATE: 74 BPM | SYSTOLIC BLOOD PRESSURE: 122 MMHG | OXYGEN SATURATION: 98 % | DIASTOLIC BLOOD PRESSURE: 64 MMHG | BODY MASS INDEX: 27.31 KG/M2 | RESPIRATION RATE: 16 BRPM

## 2022-10-14 DIAGNOSIS — Z00.00 MEDICARE ANNUAL WELLNESS VISIT, SUBSEQUENT: Primary | ICD-10-CM

## 2022-10-14 PROCEDURE — G0439 PPPS, SUBSEQ VISIT: HCPCS | Performed by: FAMILY MEDICINE

## 2022-10-14 PROCEDURE — 90694 VACC AIIV4 NO PRSRV 0.5ML IM: CPT | Performed by: FAMILY MEDICINE

## 2022-10-14 PROCEDURE — G0008 ADMIN INFLUENZA VIRUS VAC: HCPCS | Performed by: FAMILY MEDICINE

## 2022-10-14 PROCEDURE — 1123F ACP DISCUSS/DSCN MKR DOCD: CPT | Performed by: FAMILY MEDICINE

## 2022-10-14 PROCEDURE — G8484 FLU IMMUNIZE NO ADMIN: HCPCS | Performed by: FAMILY MEDICINE

## 2022-10-14 RX ORDER — ATORVASTATIN CALCIUM 10 MG/1
10 TABLET, FILM COATED ORAL EVERY OTHER DAY
Qty: 90 TABLET | Refills: 1 | Status: SHIPPED | OUTPATIENT
Start: 2022-10-14

## 2022-10-14 ASSESSMENT — PATIENT HEALTH QUESTIONNAIRE - PHQ9
SUM OF ALL RESPONSES TO PHQ QUESTIONS 1-9: 1
SUM OF ALL RESPONSES TO PHQ9 QUESTIONS 1 & 2: 1
SUM OF ALL RESPONSES TO PHQ QUESTIONS 1-9: 1
2. FEELING DOWN, DEPRESSED OR HOPELESS: 1
1. LITTLE INTEREST OR PLEASURE IN DOING THINGS: 0

## 2022-10-14 ASSESSMENT — LIFESTYLE VARIABLES
HOW MANY STANDARD DRINKS CONTAINING ALCOHOL DO YOU HAVE ON A TYPICAL DAY: 1 OR 2
HOW OFTEN DO YOU HAVE A DRINK CONTAINING ALCOHOL: MONTHLY OR LESS

## 2022-10-14 NOTE — PROGRESS NOTES
Medicare Annual Wellness Visit    Balbir Nath is here for Medicare AWV    Assessment & Plan   Medicare annual wellness visit, subsequent    Recommendations for Preventive Services Due: see orders and patient instructions/AVS.  Recommended screening schedule for the next 5-10 years is provided to the patient in written form: see Patient Instructions/AVS.     Return for Medicare Annual Wellness Visit in 1 year. Subjective   The following acute and/or chronic problems were also addressed today:  Skin cancer being followed by Dermatology    Patient's complete Health Risk Assessment and screening values have been reviewed and are found in Flowsheets. The following problems were reviewed today and where indicated follow up appointments were made and/or referrals ordered.     Positive Risk Factor Screenings with Interventions:             General Health and ACP:  General  In general, how would you say your health is?: Good  In the past 7 days, have you experienced any of the following: New or Increased Pain, New or Increased Fatigue, Loneliness, Social Isolation, Stress or Anger?: (!) Yes  Select all that apply: (!) New or Increased Fatigue, New or Increased Pain  Do you get the social and emotional support that you need?: Yes  Do you have a Living Will?: (!) No    Advance Directives       Power of  Living Will ACP-Advance Directive ACP-Power of     Not on File Not on File Not on File Not on File        General Health Risk Interventions:  No Living Will: Patient declines ACP discussion/assistance    Health Habits/Nutrition:  Physical Activity: Inactive    Days of Exercise per Week: 0 days    Minutes of Exercise per Session: 0 min     Have you lost any weight without trying in the past 3 months?: No  Body mass index: (!) 27.25  Have you seen the dentist within the past year?: (!) No  Health Habits/Nutrition Interventions:  Nutritional issues:  patient is not ready to address his/her nutritional/weight issues at this time  Dental exam overdue:  patient encouraged to make appointment with his/her dentist    Hearing/Vision:  Do you or your family notice any trouble with your hearing that hasn't been managed with hearing aids?: No  Do you have difficulty driving, watching TV, or doing any of your daily activities because of your eyesight?: No  Have you had an eye exam within the past year?: (!) No  No results found. Hearing/Vision Interventions:  Vision concerns:  patient encouraged to make appointment with his/her eye specialist            Objective   Vitals:    10/14/22 1311   BP: 122/64   Pulse: 74   Resp: 16   SpO2: 98%   Weight: 174 lb (78.9 kg)   Height: 5' 7\" (1.702 m)      Body mass index is 27.25 kg/m².       General Appearance: alert and oriented to person, place and time, well developed and well- nourished, in no acute distress  Skin: warm and dry, no rash or erythema  Head: normocephalic and atraumatic  Eyes: pupils equal, round, and reactive to light, extraocular eye movements intact, conjunctivae normal  ENT: tympanic membrane, external ear and ear canal normal bilaterally, nose without deformity, nasal mucosa and turbinates normal without polyps  Neck: supple and non-tender without mass, no thyromegaly or thyroid nodules, no cervical lymphadenopathy  Pulmonary/Chest: clear to auscultation bilaterally- no wheezes, rales or rhonchi, normal air movement, no respiratory distress  Cardiovascular: normal rate, regular rhythm, normal S1 and S2, no murmurs, rubs, clicks, or gallops, distal pulses intact, no carotid bruits  Abdomen: soft, non-tender, non-distended, normal bowel sounds, no masses or organomegaly  Extremities: no cyanosis, clubbing or edema  Musculoskeletal: normal range of motion, no joint swelling, deformity or tenderness  Neurologic: reflexes normal and symmetric, no cranial nerve deficit, gait, coordination and speech normal       No Known Allergies  Prior to Visit Medications    Medication Sig Taking?  Authorizing Provider   atorvastatin (LIPITOR) 10 MG tablet Take 1 tablet by mouth every other day NIGHTLY Yes Tisha Toribio DO       CareTeam (Including outside providers/suppliers regularly involved in providing care):   Patient Care Team:  Tisha Toribio DO as PCP - General (Family Medicine)  Tisha Toribio DO as PCP - [facility] Empaneled Provider  Chasity Sanchez MD as Consulting Physician [facility])     Reviewed and updated this visit:  Tobacco  Allergies  Meds  Problems  Med Hx  Surg Hx  Soc Hx  Fam Hx

## 2022-10-14 NOTE — PATIENT INSTRUCTIONS
Personalized Preventive Plan for Luis Carlos Carrizales - 10/14/2022  Medicare offers a range of preventive health benefits. Some of the tests and screenings are paid in full while other may be subject to a deductible, co-insurance, and/or copay. Some of these benefits include a comprehensive review of your medical history including lifestyle, illnesses that may run in your family, and various assessments and screenings as appropriate. After reviewing your medical record and screening and assessments performed today your provider may have ordered immunizations, labs, imaging, and/or referrals for you. A list of these orders (if applicable) as well as your Preventive Care list are included within your After Visit Summary for your review. Other Preventive Recommendations:    A preventive eye exam performed by an eye specialist is recommended every 1-2 years to screen for glaucoma; cataracts, macular degeneration, and other eye disorders. A preventive dental visit is recommended every 6 months. Try to get at least 150 minutes of exercise per week or 10,000 steps per day on a pedometer . Order or download the FREE \"Exercise & Physical Activity: Your Everyday Guide\" from [employer]. Call 7-294.539.9345 or search [employer] online. You need 3436-7218 mg of calcium and 6229-7369 IU of vitamin D per day. It is possible to meet your calcium requirement with diet alone, but a vitamin D supplement is usually necessary to meet this goal.  When exposed to the sun, use a sunscreen that protects against both UVA and UVB radiation with an SPF of 30 or greater. Reapply every 2 to 3 hours or after sweating, drying off with a towel, or swimming. Always wear a seat belt when traveling in a car. Always wear a helmet when riding a bicycle or motorcycle.

## 2022-10-17 LAB
ALBUMIN SERPL-MCNC: 3.3 G/DL (ref 3.5–5.2)
ALP BLD-CCNC: 98 U/L (ref 40–129)
ALT SERPL-CCNC: 14 U/L (ref 0–40)
ANION GAP SERPL CALCULATED.3IONS-SCNC: 10 MMOL/L (ref 7–16)
AST SERPL-CCNC: 18 U/L (ref 0–39)
BASOPHILS ABSOLUTE: 0.06 E9/L (ref 0–0.2)
BASOPHILS RELATIVE PERCENT: 0.7 % (ref 0–2)
BILIRUB SERPL-MCNC: 0.4 MG/DL (ref 0–1.2)
BUN BLDV-MCNC: 12 MG/DL (ref 6–23)
CALCIUM SERPL-MCNC: 10.1 MG/DL (ref 8.6–10.2)
CHLORIDE BLD-SCNC: 107 MMOL/L (ref 98–107)
CO2: 25 MMOL/L (ref 22–29)
CORTISOL TOTAL: 17.06 MCG/DL (ref 2.68–18.4)
CREAT SERPL-MCNC: 1.1 MG/DL (ref 0.7–1.2)
EOSINOPHILS ABSOLUTE: 0.3 E9/L (ref 0.05–0.5)
EOSINOPHILS RELATIVE PERCENT: 3.6 % (ref 0–6)
GFR AFRICAN AMERICAN: >60
GFR NON-AFRICAN AMERICAN: >60 ML/MIN/1.73
GLUCOSE BLD-MCNC: 115 MG/DL (ref 74–99)
HCT VFR BLD CALC: 42.8 % (ref 37–54)
HEMOGLOBIN: 13.5 G/DL (ref 12.5–16.5)
IMMATURE GRANULOCYTES #: 0.02 E9/L
IMMATURE GRANULOCYTES %: 0.2 % (ref 0–5)
LACTATE DEHYDROGENASE: 223 U/L (ref 135–225)
LYMPHOCYTES ABSOLUTE: 1.19 E9/L (ref 1.5–4)
LYMPHOCYTES RELATIVE PERCENT: 14.1 % (ref 20–42)
MCH RBC QN AUTO: 30.3 PG (ref 26–35)
MCHC RBC AUTO-ENTMCNC: 31.5 % (ref 32–34.5)
MCV RBC AUTO: 96.2 FL (ref 80–99.9)
MONOCYTES ABSOLUTE: 0.75 E9/L (ref 0.1–0.95)
MONOCYTES RELATIVE PERCENT: 8.9 % (ref 2–12)
NEUTROPHILS ABSOLUTE: 6.09 E9/L (ref 1.8–7.3)
NEUTROPHILS RELATIVE PERCENT: 72.5 % (ref 43–80)
PDW BLD-RTO: 12.2 FL (ref 11.5–15)
PLATELET # BLD: 447 E9/L (ref 130–450)
PMV BLD AUTO: 9.4 FL (ref 7–12)
POTASSIUM SERPL-SCNC: 4.2 MMOL/L (ref 3.5–5)
RBC # BLD: 4.45 E12/L (ref 3.8–5.8)
SODIUM BLD-SCNC: 142 MMOL/L (ref 132–146)
TOTAL PROTEIN: 7.1 G/DL (ref 6.4–8.3)
TSH SERPL DL<=0.05 MIU/L-ACNC: 1.68 UIU/ML (ref 0.27–4.2)
WBC # BLD: 8.4 E9/L (ref 4.5–11.5)

## 2022-11-11 LAB
ALBUMIN SERPL-MCNC: 3.8 G/DL (ref 3.5–5.2)
ALP BLD-CCNC: 104 U/L (ref 40–129)
ALT SERPL-CCNC: 14 U/L (ref 0–40)
ANION GAP SERPL CALCULATED.3IONS-SCNC: 10 MMOL/L (ref 7–16)
AST SERPL-CCNC: 18 U/L (ref 0–39)
BILIRUB SERPL-MCNC: 0.7 MG/DL (ref 0–1.2)
BUN BLDV-MCNC: 18 MG/DL (ref 6–23)
CALCIUM SERPL-MCNC: 10.5 MG/DL (ref 8.6–10.2)
CHLORIDE BLD-SCNC: 101 MMOL/L (ref 98–107)
CO2: 28 MMOL/L (ref 22–29)
CREAT SERPL-MCNC: 1.2 MG/DL (ref 0.7–1.2)
GFR SERPL CREATININE-BSD FRML MDRD: 57 ML/MIN/1.73
GLUCOSE BLD-MCNC: 102 MG/DL (ref 74–99)
LACTATE DEHYDROGENASE: 185 U/L (ref 135–225)
POTASSIUM SERPL-SCNC: 4 MMOL/L (ref 3.5–5)
SODIUM BLD-SCNC: 139 MMOL/L (ref 132–146)
TOTAL PROTEIN: 7.6 G/DL (ref 6.4–8.3)
TSH SERPL DL<=0.05 MIU/L-ACNC: 2.27 UIU/ML (ref 0.27–4.2)

## 2022-11-19 ENCOUNTER — HOSPITAL ENCOUNTER (OUTPATIENT)
Dept: DATA CONVERSION | Age: 87
End: 2022-11-19

## 2022-11-21 ENCOUNTER — APPOINTMENT (OUTPATIENT)
Dept: CT IMAGING | Age: 87
End: 2022-11-21
Payer: MEDICARE

## 2022-11-21 ENCOUNTER — TELEPHONE (OUTPATIENT)
Dept: PRIMARY CARE CLINIC | Age: 87
End: 2022-11-21

## 2022-11-21 ENCOUNTER — APPOINTMENT (OUTPATIENT)
Dept: GENERAL RADIOLOGY | Age: 87
End: 2022-11-21
Payer: MEDICARE

## 2022-11-21 ENCOUNTER — HOSPITAL ENCOUNTER (EMERGENCY)
Age: 87
Discharge: HOME | End: 2022-11-21
Attending: EMERGENCY MEDICINE
Payer: MEDICARE

## 2022-11-21 VITALS
BODY MASS INDEX: 27.31 KG/M2 | TEMPERATURE: 97.7 F | SYSTOLIC BLOOD PRESSURE: 119 MMHG | WEIGHT: 174 LBS | DIASTOLIC BLOOD PRESSURE: 67 MMHG | HEIGHT: 67 IN | OXYGEN SATURATION: 97 % | RESPIRATION RATE: 17 BRPM | HEART RATE: 92 BPM

## 2022-11-21 DIAGNOSIS — K59.00 CONSTIPATION, UNSPECIFIED CONSTIPATION TYPE: Primary | ICD-10-CM

## 2022-11-21 DIAGNOSIS — R33.9 URINARY RETENTION: ICD-10-CM

## 2022-11-21 LAB
ALBUMIN SERPL-MCNC: 3.8 G/DL (ref 3.5–5.2)
ALP BLD-CCNC: 148 U/L (ref 40–129)
ALT SERPL-CCNC: 22 U/L (ref 0–40)
ANION GAP SERPL CALCULATED.3IONS-SCNC: 12 MMOL/L (ref 7–16)
AST SERPL-CCNC: 20 U/L (ref 0–39)
BACTERIA: ABNORMAL /HPF
BASOPHILS ABSOLUTE: 0.05 E9/L (ref 0–0.2)
BASOPHILS RELATIVE PERCENT: 0.4 % (ref 0–2)
BILIRUB SERPL-MCNC: 1.5 MG/DL (ref 0–1.2)
BILIRUBIN URINE: NEGATIVE
BLOOD, URINE: NEGATIVE
BUN BLDV-MCNC: 14 MG/DL (ref 6–23)
CALCIUM SERPL-MCNC: 9.6 MG/DL (ref 8.6–10.2)
CHLORIDE BLD-SCNC: 101 MMOL/L (ref 98–107)
CLARITY: CLEAR
CO2: 27 MMOL/L (ref 22–29)
COLOR: YELLOW
CREAT SERPL-MCNC: 1 MG/DL (ref 0.7–1.2)
EOSINOPHILS ABSOLUTE: 0.03 E9/L (ref 0.05–0.5)
EOSINOPHILS RELATIVE PERCENT: 0.3 % (ref 0–6)
GFR SERPL CREATININE-BSD FRML MDRD: >60 ML/MIN/1.73
GLUCOSE BLD-MCNC: 108 MG/DL (ref 74–99)
GLUCOSE URINE: NEGATIVE MG/DL
HCT VFR BLD CALC: 41.7 % (ref 37–54)
HEMOGLOBIN: 13.7 G/DL (ref 12.5–16.5)
IMMATURE GRANULOCYTES #: 0.17 E9/L
IMMATURE GRANULOCYTES %: 1.4 % (ref 0–5)
KETONES, URINE: NEGATIVE MG/DL
LACTIC ACID: 2 MMOL/L (ref 0.5–2.2)
LEUKOCYTE ESTERASE, URINE: NEGATIVE
LIPASE: 13 U/L (ref 13–60)
LYMPHOCYTES ABSOLUTE: 0.67 E9/L (ref 1.5–4)
LYMPHOCYTES RELATIVE PERCENT: 5.6 % (ref 20–42)
MCH RBC QN AUTO: 29.6 PG (ref 26–35)
MCHC RBC AUTO-ENTMCNC: 32.9 % (ref 32–34.5)
MCV RBC AUTO: 90.1 FL (ref 80–99.9)
MONOCYTES ABSOLUTE: 0.85 E9/L (ref 0.1–0.95)
MONOCYTES RELATIVE PERCENT: 7.1 % (ref 2–12)
NEUTROPHILS ABSOLUTE: 10.2 E9/L (ref 1.8–7.3)
NEUTROPHILS RELATIVE PERCENT: 85.2 % (ref 43–80)
NITRITE, URINE: NEGATIVE
PDW BLD-RTO: 13.5 FL (ref 11.5–15)
PH UA: 8.5 (ref 5–9)
PLATELET # BLD: 228 E9/L (ref 130–450)
PMV BLD AUTO: 10 FL (ref 7–12)
POTASSIUM SERPL-SCNC: 3.8 MMOL/L (ref 3.5–5)
PROTEIN UA: ABNORMAL MG/DL
RBC # BLD: 4.63 E12/L (ref 3.8–5.8)
RBC UA: ABNORMAL /HPF (ref 0–2)
SODIUM BLD-SCNC: 140 MMOL/L (ref 132–146)
SPECIFIC GRAVITY UA: 1.01 (ref 1–1.03)
TOTAL PROTEIN: 6.9 G/DL (ref 6.4–8.3)
UROBILINOGEN, URINE: 0.2 E.U./DL
WBC # BLD: 12 E9/L (ref 4.5–11.5)
WBC UA: ABNORMAL /HPF (ref 0–5)

## 2022-11-21 PROCEDURE — 85025 COMPLETE CBC W/AUTO DIFF WBC: CPT

## 2022-11-21 PROCEDURE — 99285 EMERGENCY DEPT VISIT HI MDM: CPT

## 2022-11-21 PROCEDURE — 74177 CT ABD & PELVIS W/CONTRAST: CPT

## 2022-11-21 PROCEDURE — 83605 ASSAY OF LACTIC ACID: CPT

## 2022-11-21 PROCEDURE — 74018 RADEX ABDOMEN 1 VIEW: CPT

## 2022-11-21 PROCEDURE — 80053 COMPREHEN METABOLIC PANEL: CPT

## 2022-11-21 PROCEDURE — 83690 ASSAY OF LIPASE: CPT

## 2022-11-21 PROCEDURE — 6360000004 HC RX CONTRAST MEDICATION: Performed by: RADIOLOGY

## 2022-11-21 PROCEDURE — 81001 URINALYSIS AUTO W/SCOPE: CPT

## 2022-11-21 RX ADMIN — IOPAMIDOL 75 ML: 755 INJECTION, SOLUTION INTRAVENOUS at 16:41

## 2022-11-21 ASSESSMENT — PAIN - FUNCTIONAL ASSESSMENT: PAIN_FUNCTIONAL_ASSESSMENT: 0-10

## 2022-11-21 ASSESSMENT — PAIN DESCRIPTION - LOCATION: LOCATION: RECTUM

## 2022-11-21 ASSESSMENT — PAIN SCALES - GENERAL: PAINLEVEL_OUTOF10: 5

## 2022-11-21 NOTE — ED PROVIDER NOTES
[facility]  Department of Emergency Medicine     Written by: Sabina Chapman DO  Patient Name: Emily Urena  Attending Provider: Shant Madden DO  Admit Date: 2022  1:10 PM  MRN: [id number]                   : 1932        Chief Complaint   Patient presents with    Constipation     constipation, impaction no bm x 2 days    - Chief complaint    Mr. Cresencio Tena is a 80year old male who presents to the ED due to constipation. Patient states that over the past several days he has had difficulty having a bowel movement. He states his last bowel movement was hard and difficult to pass. Patient also notes that over the past days has had increasing difficulty urinating. He says that he has only had a little bit of urine expressed over the past day. Patient endorses abdominal distention and a sensation of fullness. Symptoms have been constant progressively worsening since onset. He denies any nausea or vomiting. Denies any aggravating relieving factors. Denies any recent fever, chills, chest pain, shortness of breath, headaches or vision changes. Review of Systems   Constitutional:  Negative for chills, fatigue and fever. HENT:  Negative for congestion, sinus pressure, sinus pain and sore throat. Eyes:  Negative for pain, redness and visual disturbance. Respiratory:  Negative for cough, chest tightness and shortness of breath. Cardiovascular:  Negative for chest pain, palpitations and leg swelling. Gastrointestinal:  Positive for constipation. Negative for abdominal pain, diarrhea, nausea and vomiting. Genitourinary:  Positive for decreased urine volume and difficulty urinating. Negative for dysuria, flank pain, frequency, hematuria and urgency. Musculoskeletal:  Negative for arthralgias, back pain, gait problem, joint swelling and myalgias. Skin:  Negative for rash.    Neurological:  Negative for dizziness, tremors, syncope, weakness, light-headedness, numbness and headaches. Physical Exam  Constitutional:       General: He is not in acute distress. Appearance: Normal appearance. He is normal weight. He is not ill-appearing or toxic-appearing. HENT:      Head: Normocephalic and atraumatic. Right Ear: External ear normal.      Left Ear: External ear normal.      Nose: Nose normal.      Mouth/Throat:      Mouth: Mucous membranes are moist.      Pharynx: Oropharynx is clear. Eyes:      Extraocular Movements: Extraocular movements intact. Conjunctiva/sclera: Conjunctivae normal.   Cardiovascular:      Rate and Rhythm: Normal rate and regular rhythm. Pulses: Normal pulses. Heart sounds: Normal heart sounds. Pulmonary:      Effort: Pulmonary effort is normal. No respiratory distress. Breath sounds: Normal breath sounds. No wheezing. Abdominal:      General: Abdomen is flat. Bowel sounds are normal. There is distension. Palpations: Abdomen is soft. Tenderness: There is no abdominal tenderness. There is no guarding or rebound. Musculoskeletal:         General: No swelling or tenderness. Normal range of motion. Cervical back: Normal range of motion and neck supple. No rigidity or tenderness. Skin:     General: Skin is warm and dry. Findings: No erythema or rash. Neurological:      General: No focal deficit present. Mental Status: He is alert and oriented to person, place, and time. Mental status is at baseline. Motor: No weakness. Psychiatric:         Mood and Affect: Mood normal.         Behavior: Behavior normal.        Procedures   LIMITED ABDOMINAL BEDSIDE ULTRASOUND     Risks, benefits and alternatives (for applicable procedures below) described. Performed By: George Wallace DO. Indication:  Urinary retention  Informed consent: The patient was counseled regarding the procedure, it's indications, risks, potential complications and alternatives and any questions were answered.  Consent was obtained. .  Procedural Quadrants:     BLADDER:  350 mL post void residual      MDM  Number of Diagnoses or Management Options  Constipation, unspecified constipation type  Urinary retention  Diagnosis management comments: This is a 80year old male who presents to the ED due to constipation. Patient CBC revealed mild leukocytosis with a white count of 12 and CMP revealed elevated bilirubin of 1.5 with a alk phos of 48. Lipase and lactic acid were normal.  Urinalysis did not reveal any signs of infection. X-ray of the abdomen revealed gaseous large and small bowel distention with stool in the rectum. CT revealed a distended distal colon suggesting the patient or fecal impaction with more proximal distended gas and fluid-filled colon with bilateral lower lobe atelectasis and/or pneumonitis and urinary bladder distention. Patient was given a soapsuds enema and was able to have copious bowel movements. A Francis was attempted twice but unable to be passed due to patient's prior prostate brachytherapy. Dr. Carole Juan of urology was consulted who stated that with patient's postvoid residual only 350 mL no further attempts to be made he should follow with urology as an outpatient. Patient reports relief of the symptoms on reevaluation. He will be discharged home at this time and told to follow with his primary care provider as well. He was informed to come back to the ED if symptoms return, worsen or change any time. ED Course as of 11/22/22 1511   Mon Nov 21, 2022 2022 Ultrasound revealed 350 mL post void residual. [JS]   2022 Spoke with Dr. Gerald Soto who stated that patient should follow up with them as an outpatient and no further attempts should be made to place a francis in the ED.   [JS]      ED Course User Index  [JS] Carrie Moseley, DO       --------------------------------------------- PAST HISTORY ---------------------------------------------  Past Medical History:  has a past medical history of Cancer [facility]), Hyperlipidemia, and Urethral stricture. Past Surgical History:  has a past surgical history that includes Cystocopy (1996) and Cystocopy (07/10/2012). Social History:  reports that he has never smoked. He has never used smokeless tobacco. He reports that he does not drink alcohol and does not use drugs. Family History: family history is not on file. The patients home medications have been reviewed. Allergies: Patient has no known allergies.     -------------------------------------------------- RESULTS -------------------------------------------------  Labs:  Results for orders placed or performed during the hospital encounter of 11/21/22   Urinalysis with Microscopic   Result Value Ref Range    Color, UA Yellow Straw/Yellow    Clarity, UA Clear Clear    Glucose, Ur Negative Negative mg/dL    Bilirubin Urine Negative Negative    Ketones, Urine Negative Negative mg/dL    Specific Gravity, UA 1.010 1.005 - 1.030    Blood, Urine Negative Negative    pH, UA 8.5 5.0 - 9.0    Protein, UA TRACE Negative mg/dL    Urobilinogen, Urine 0.2 <2.0 E.U./dL    Nitrite, Urine Negative Negative    Leukocyte Esterase, Urine Negative Negative    WBC, UA 0-1 0 - 5 /HPF    RBC, UA 0-1 0 - 2 /HPF    Bacteria, UA RARE (A) None Seen /HPF   CBC with Diff   Result Value Ref Range    WBC 12.0 (H) 4.5 - 11.5 E9/L    RBC 4.63 3.80 - 5.80 E12/L    Hemoglobin 13.7 12.5 - 16.5 g/dL    Hematocrit 41.7 37.0 - 54.0 %    MCV 90.1 80.0 - 99.9 fL    MCH 29.6 26.0 - 35.0 pg    MCHC 32.9 32.0 - 34.5 %    RDW 13.5 11.5 - 15.0 fL    Platelets 678 173 - 398 E9/L    MPV 10.0 7.0 - 12.0 fL    Neutrophils % 85.2 (H) 43.0 - 80.0 %    Immature Granulocytes % 1.4 0.0 - 5.0 %    Lymphocytes % 5.6 (L) 20.0 - 42.0 %    Monocytes % 7.1 2.0 - 12.0 %    Eosinophils % 0.3 0.0 - 6.0 %    Basophils % 0.4 0.0 - 2.0 %    Neutrophils Absolute 10.20 (H) 1.80 - 7.30 E9/L    Immature Granulocytes # 0.17 E9/L    Lymphocytes Absolute 0.67 (L) 1.50 - 4.00 E9/L    Monocytes Absolute 0.85 0.10 - 0.95 E9/L    Eosinophils Absolute 0.03 (L) 0.05 - 0.50 E9/L    Basophils Absolute 0.05 0.00 - 0.20 E9/L   CMP   Result Value Ref Range    Sodium 140 132 - 146 mmol/L    Potassium 3.8 3.5 - 5.0 mmol/L    Chloride 101 98 - 107 mmol/L    CO2 27 22 - 29 mmol/L    Anion Gap 12 7 - 16 mmol/L    Glucose 108 (H) 74 - 99 mg/dL    BUN 14 6 - 23 mg/dL    Creatinine 1.0 0.7 - 1.2 mg/dL    Est, Glom Filt Rate >60 >=60 mL/min/1.73    Calcium 9.6 8.6 - 10.2 mg/dL    Total Protein 6.9 6.4 - 8.3 g/dL    Albumin 3.8 3.5 - 5.2 g/dL    Total Bilirubin 1.5 (H) 0.0 - 1.2 mg/dL    Alkaline Phosphatase 148 (H) 40 - 129 U/L    ALT 22 0 - 40 U/L    AST 20 0 - 39 U/L   Lipase   Result Value Ref Range    Lipase 13 13 - 60 U/L   Lactic Acid (Select if patient is over 65 to rule out mesenteric ischemia)   Result Value Ref Range    Lactic Acid 2.0 0.5 - 2.2 mmol/L       Radiology:  CT ABDOMEN PELVIS W IV CONTRAST Additional Contrast? None   Final Result   Stool distended distal colon suggesting constipation or fecal impaction with   more proximally distended gas and fluid-filled colon. Hepatic steatosis and hepatic cysts. Bilateral lower lobe atelectasis and/or pneumonitis. Small hiatal hernia. Urinary bladder distension. Outlet obstruction cannot be excluded. This   patient might benefit from Cerda catheter decompression if the patient is   unable to void. Evidence of prostate brachytherapy treatment. Mild presacral fat induration   might be related to previous radiation treatment. XR ABDOMEN (KUB) (SINGLE AP VIEW)   Final Result   Gaseous small and large intestinal distension. Stool in the rectum.             ------------------------- NURSING NOTES AND VITALS REVIEWED ---------------------------  Date / Time Roomed:  11/21/2022  1:10 PM  ED Bed Assignment:  02/02    The nursing notes within the ED encounter and vital signs as below have been reviewed.    /67 Pulse 92   Temp 97.7 °F (36.5 °C) (Oral)   Resp 17   Ht 5' 7\" (1.702 m)   Wt 174 lb (78.9 kg)   SpO2 97%   BMI 27.25 kg/m²   Oxygen Saturation Interpretation: Normal      ------------------------------------------ PROGRESS NOTES ------------------------------------------  3:11 PM EST  I have spoken with the patient and discussed todays results, in addition to providing specific details for the plan of care and counseling regarding the diagnosis and prognosis. Their questions are answered at this time and they are agreeable with the plan. I discussed at length with them reasons for immediate return here for re evaluation. They will followup with their  urologist and primary care physician by calling their office tomorrow. --------------------------------- ADDITIONAL PROVIDER NOTES ---------------------------------  At this time the patient is without objective evidence of an acute process requiring hospitalization or inpatient management. They have remained hemodynamically stable throughout their entire ED visit and are stable for discharge with outpatient follow-up. The plan has been discussed in detail and they are aware of the specific conditions for emergent return, as well as the importance of follow-up. Discharge Medication List as of 11/21/2022  8:32 PM          Diagnosis:  1. Constipation, unspecified constipation type    2. Urinary retention        Disposition:  Patient's disposition: Discharge to home  Patient's condition is stable. Patient was seen and evaluated by myself and my attending Mariel Szymanski DO. Assessment and Plan discussed with attending provider, please see attestation for final plan of care.      DO Kaitlyn Chisholm DO  Resident  11/22/22 2937

## 2022-11-21 NOTE — ED NOTES
Put soap jeannie enema in. Patient continues to have multiple bowel movements.       Ham Christianson RN  11/21/22 [id number]

## 2022-11-21 NOTE — TELEPHONE ENCOUNTER
Wife calling. Patient complaining of constipation x 2 weeks. Home nurse gave partial enema this morning. Patient states that he feels like \"something is still stuck\" and in a lot of pain. Advised to go to ED. Wife voiced understanding.

## 2022-11-21 NOTE — ED NOTES
Patient had large BM.       Bernard Mendoza, RN  11/21/22 [address], RN  11/21/22 1469

## 2022-11-21 NOTE — ED NOTES
Department of Emergency Medicine  FIRST PROVIDER TRIAGE NOTE             Independent MLP           11/21/22  1:09 PM EST    Date of Encounter: 11/21/22   MRN: [id number]      HPI: Adamaris Jimenez is a 80 y.o. male who presents to the ED for Constipation (constipation, impaction no bm x 2 days)   Home health nurse gave partial enema today. No relief. Discomfort when sitting. PCP referred patient here for eval.     ROS: Negative for chest pain, nausea, vomiting, diarrhea, SOB. PE: Gen Appearance/Constitutional: alert, rectal pain when sitting, GCS 15, hard of hearing. Initial Plan of Care: All treatment areas with department are currently occupied. Plan to order/Initiate the following while awaiting opening in ED: imaging studies.   Initiate Treatment-Testing, Proceed toTreatment Area When Bed Available for ED Attending/MLP to Continue Care    Electronically signed by MAVIS Dickey CNP   DD: 11/21/22      MAVIS Dickey CNP  11/21/22 2639

## 2022-11-22 ASSESSMENT — ENCOUNTER SYMPTOMS
EYE REDNESS: 0
BACK PAIN: 0
NAUSEA: 0
SHORTNESS OF BREATH: 0
DIARRHEA: 0
EYE PAIN: 0
CONSTIPATION: 1
SINUS PRESSURE: 0
COUGH: 0
CHEST TIGHTNESS: 0
SINUS PAIN: 0
ABDOMINAL PAIN: 0
SORE THROAT: 0
VOMITING: 0

## 2022-11-22 NOTE — ED NOTES
Attempted Coude catheter (16 fr) with blockage noted at the prostate. Unable to pass catheter thru.    Dr. Alana Denver  11/21/22 2000

## 2023-01-27 LAB
ALBUMIN SERPL-MCNC: 3.5 G/DL (ref 3.5–5.2)
ALP BLD-CCNC: 172 U/L (ref 40–129)
ALT SERPL-CCNC: 10 U/L (ref 0–40)
ANION GAP SERPL CALCULATED.3IONS-SCNC: 13 MMOL/L (ref 7–16)
ANISOCYTOSIS: ABNORMAL
AST SERPL-CCNC: 21 U/L (ref 0–39)
BASOPHILS ABSOLUTE: 0 E9/L (ref 0–0.2)
BASOPHILS RELATIVE PERCENT: 0.8 % (ref 0–2)
BILIRUB SERPL-MCNC: 0.2 MG/DL (ref 0–1.2)
BUN BLDV-MCNC: 12 MG/DL (ref 6–23)
BURR CELLS: ABNORMAL
CALCIUM SERPL-MCNC: 9.4 MG/DL (ref 8.6–10.2)
CHLORIDE BLD-SCNC: 104 MMOL/L (ref 98–107)
CO2: 23 MMOL/L (ref 22–29)
CREAT SERPL-MCNC: 1 MG/DL (ref 0.7–1.2)
EOSINOPHILS ABSOLUTE: 0 E9/L (ref 0.05–0.5)
EOSINOPHILS RELATIVE PERCENT: 0 % (ref 0–6)
GFR SERPL CREATININE-BSD FRML MDRD: >60 ML/MIN/1.73
GLUCOSE BLD-MCNC: 93 MG/DL (ref 74–99)
HCT VFR BLD CALC: 29.1 % (ref 37–54)
HEMOGLOBIN: 9.4 G/DL (ref 12.5–16.5)
LACTATE DEHYDROGENASE: 277 U/L (ref 135–225)
LYMPHOCYTES ABSOLUTE: 0.86 E9/L (ref 1.5–4)
LYMPHOCYTES RELATIVE PERCENT: 4.4 % (ref 20–42)
MCH RBC QN AUTO: 33 PG (ref 26–35)
MCHC RBC AUTO-ENTMCNC: 32.3 % (ref 32–34.5)
MCV RBC AUTO: 102.1 FL (ref 80–99.9)
METAMYELOCYTES RELATIVE PERCENT: 4.3 % (ref 0–1)
MONOCYTES ABSOLUTE: 3.66 E9/L (ref 0.1–0.95)
MONOCYTES RELATIVE PERCENT: 16.5 % (ref 2–12)
MYELOCYTE PERCENT: 0.9 % (ref 0–0)
NEUTROPHILS ABSOLUTE: 16.56 E9/L (ref 1.8–7.3)
NEUTROPHILS RELATIVE PERCENT: 72.2 % (ref 43–80)
NUCLEATED RED BLOOD CELLS: 0.9 /100 WBC
OVALOCYTES: ABNORMAL
PAPPENHEIMER BODIES: ABNORMAL
PDW BLD-RTO: 22.9 FL (ref 11.5–15)
PLATELET # BLD: 515 E9/L (ref 130–450)
PMV BLD AUTO: 9.5 FL (ref 7–12)
POIKILOCYTES: ABNORMAL
POLYCHROMASIA: ABNORMAL
POTASSIUM SERPL-SCNC: 4.1 MMOL/L (ref 3.5–5)
PROMYELOCYTES PERCENT: 1.7 % (ref 0–0)
RBC # BLD: 2.85 E12/L (ref 3.8–5.8)
SODIUM BLD-SCNC: 140 MMOL/L (ref 132–146)
TARGET CELLS: ABNORMAL
TOTAL PROTEIN: 6.2 G/DL (ref 6.4–8.3)
VACUOLATED NEUTROPHILS: ABNORMAL
WBC # BLD: 21.5 E9/L (ref 4.5–11.5)

## 2023-02-10 ENCOUNTER — TELEPHONE (OUTPATIENT)
Dept: PRIMARY CARE CLINIC | Age: 88
End: 2023-02-10

## 2023-02-10 NOTE — TELEPHONE ENCOUNTER
[facility] calling. Low blood pressure 82/50, overall not feeling well. Called the cancer doctor and they recommended patient go to ED for possible hydration. I did advise home nurse ED as well.

## 2023-09-22 RX ORDER — ATORVASTATIN CALCIUM 10 MG/1
10 TABLET, FILM COATED ORAL EVERY OTHER DAY
Qty: 90 TABLET | Refills: 1 | Status: SHIPPED | OUTPATIENT
Start: 2023-09-22

## 2023-10-16 PROBLEM — R91.8 ABNORMAL FINDINGS ON DIAGNOSTIC IMAGING OF LUNG: Status: ACTIVE | Noted: 2023-10-16

## 2023-10-16 PROBLEM — R21 RASH AND OTHER NONSPECIFIC SKIN ERUPTION: Status: ACTIVE | Noted: 2023-02-14

## 2023-10-16 PROBLEM — C43.62: Status: ACTIVE | Noted: 2023-10-16

## 2023-10-16 PROBLEM — D48.5 NEOPLASM OF UNCERTAIN BEHAVIOR OF SKIN: Status: ACTIVE | Noted: 2023-02-14

## 2023-10-16 PROBLEM — R59.0 LYMPHADENOPATHY, HILAR: Status: ACTIVE | Noted: 2023-10-16

## 2023-10-16 RX ORDER — CHLORPHENIRAMINE MALEATE 4 MG
4 TABLET ORAL EVERY 6 HOURS PRN
COMMUNITY
Start: 2021-11-04

## 2023-10-16 RX ORDER — LIDOCAINE AND PRILOCAINE 25; 25 MG/G; MG/G
CREAM TOPICAL
COMMUNITY
Start: 2022-11-30

## 2023-10-16 RX ORDER — ATORVASTATIN CALCIUM 10 MG/1
10 TABLET, FILM COATED ORAL NIGHTLY
COMMUNITY

## 2023-10-17 ENCOUNTER — APPOINTMENT (OUTPATIENT)
Dept: DERMATOLOGY | Facility: CLINIC | Age: 88
End: 2023-10-17
Payer: MEDICARE

## 2023-11-02 RX ORDER — HEPARIN 100 UNIT/ML
500 SYRINGE INTRAVENOUS AS NEEDED
OUTPATIENT
Start: 2023-11-02

## 2023-11-02 RX ORDER — HEPARIN SODIUM,PORCINE/PF 10 UNIT/ML
50 SYRINGE (ML) INTRAVENOUS AS NEEDED
OUTPATIENT
Start: 2023-11-02

## 2023-11-09 ENCOUNTER — HOSPITAL ENCOUNTER (OUTPATIENT)
Dept: RADIOLOGY | Facility: HOSPITAL | Age: 88
End: 2023-11-09
Payer: MEDICARE

## 2023-11-09 ENCOUNTER — HOSPITAL ENCOUNTER (OUTPATIENT)
Dept: RADIOLOGY | Facility: HOSPITAL | Age: 88
Discharge: HOME | End: 2023-11-09
Payer: MEDICARE

## 2023-11-09 DIAGNOSIS — C43.9 MALIGNANT MELANOMA OF SKIN, UNSPECIFIED (MULTI): ICD-10-CM

## 2023-11-09 PROBLEM — S09.90XA HEAD INJURY: Status: ACTIVE | Noted: 2023-11-09

## 2023-11-09 PROBLEM — I44.1 AV BLOCK, MOBITZ II: Status: ACTIVE | Noted: 2021-04-17

## 2023-11-09 PROBLEM — E78.49 OTHER HYPERLIPIDEMIA: Status: ACTIVE | Noted: 2021-04-17

## 2023-11-09 PROBLEM — R55 SYNCOPE AND COLLAPSE: Status: ACTIVE | Noted: 2021-04-17

## 2023-11-09 PROCEDURE — 2550000001 HC RX 255 CONTRASTS: Performed by: INTERNAL MEDICINE

## 2023-11-09 PROCEDURE — 70553 MRI BRAIN STEM W/O & W/DYE: CPT | Performed by: RADIOLOGY

## 2023-11-09 PROCEDURE — A9575 INJ GADOTERATE MEGLUMI 0.1ML: HCPCS | Performed by: INTERNAL MEDICINE

## 2023-11-09 PROCEDURE — 70553 MRI BRAIN STEM W/O & W/DYE: CPT

## 2023-11-09 RX ORDER — GADOTERATE MEGLUMINE 376.9 MG/ML
0.2 INJECTION INTRAVENOUS
Status: COMPLETED | OUTPATIENT
Start: 2023-11-09 | End: 2023-11-09

## 2023-11-09 RX ADMIN — GADOTERATE MEGLUMINE 14.5 ML: 376.9 INJECTION INTRAVENOUS at 12:20

## 2023-11-14 ENCOUNTER — TELEMEDICINE (OUTPATIENT)
Dept: HEMATOLOGY/ONCOLOGY | Facility: HOSPITAL | Age: 88
End: 2023-11-14
Payer: MEDICARE

## 2023-11-14 ENCOUNTER — TELEPHONE (OUTPATIENT)
Dept: HEMATOLOGY/ONCOLOGY | Facility: HOSPITAL | Age: 88
End: 2023-11-14

## 2023-11-14 DIAGNOSIS — C43.62 MALIGNANT MELANOMA OF ARM, LEFT (MULTI): Primary | ICD-10-CM

## 2023-11-14 DIAGNOSIS — R59.0 LYMPHADENOPATHY, HILAR: ICD-10-CM

## 2023-11-14 PROCEDURE — 99443 PR PHYS/QHP TELEPHONE EVALUATION 21-30 MIN: CPT | Performed by: INTERNAL MEDICINE

## 2023-11-15 NOTE — PROGRESS NOTES
.Patient ID: Gokul Harmon is a 89 y.o. male.  Referring Physician: No referring provider defined for this encounter.  Primary Care Provider: Leona Ribeiro DO     Chief Complaint   Patient presents with    Follow-up    Melanoma        Cancer History:   Treatment Synopsis:   Mr. Gokul Harmon is referred to Medical Oncology team by Dr. Wander Ortiz for comanagement of unresectable melanoma of the left forearm. He met with our team  on 7/19/22.     Mr. Harmon presented to Dr. Ortiz on May 23, 2022 (referred by Dr. Fozia Cortes and Dr. 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      Mr. Harmon had noticed a small boil like lesion on the left forearm somewhere in March 2022 which grew quickly and darkened his skin very quickly till it assumed current proportions. The main lesion was 3cm in dimension when he met with Dr. Ortiz.  Most of his forearm was called to have ecchymosis, but he does have a thick ulcerated melanoma on the left forearm (dorsal aspect) which is actively bleeding. He consented for Ipilimumab and Nivolumab and was started on CHECKMATE 511 protocol.      After 4 cycles, progressive disease was noted on the PET scan on 10/13/2022. MRI brain showed 2 very small focii suspicious for mets (10/19/2022). He met me on 11/2/22. We discussed chemotherapy with CVT regimen versus hospice. He sought one more day  and we spoke on 11/3/2022. He agreed to taking chemotherapy. Consent signed on 11/09/2022. After 2 cycles of chemotherapy, his MRI and PET scan on 12/19/2022 showed tremendous improvement. Continues with chemotherapy.      On 02/22/23, he reported extreme fatigue with last chemotherapy. Hence, we reduced the dose by 33%. Imaging on 03/10/23 showed continued improvement in the melanoma mass. I further truncated the infusions to temodar only due to his continued fatigue.  In 03/20/23, infusions were eliminated to help with fatigue, but he came back on 4/5/23 with even  "worse fatigue.      I held the infusions for now since 4/5/23. RT tot he right forearm mass was given on 4/10/23 to 4/21/23 (36 Gy in 6 fractions). He came to see me on 5/10/23 and his overall performance status was quite poor. We recommended hospice. However, he wanted  to follow up.      I called him on 6/28/23 and he was feeling OK. We ordered restaging scans. PET scan done on 7/20/23 showed slightly progressive disease. MRI brain on 7/27/23 showed one questionable spot in the thalamus. Repeat MRI brain on 7/27/23 showed a questionable  spot in the thalamus, but no other overt disease. We recommend continued surveillance owing to his advanced age and poor PS. We saw the results of the liquid biopsy. It seems that his TMB is quite high (~47 Nut/Mb).      He got MRI brain on 11/10/23 which was unremarkable. But he could not get his PET scan done (he ate in the morning). It is rescheduled for 3 weeks.      Treatment History:   Systemic Treatment  1. CHECKMATE 511 protocol  C1- 07/25/2022  C2- 08/17/2022  C3- 09/07/2022  C4- 09/28/2022- Progressive disease noted.     2. CVT regimen  C1- 11/14/22 to 11/16/22  C2- 12/05/22 to 12/07/22  C3- 12/27/22 to 12/29/22  C4- 01/16/23 to 01/18/23  C5- 02/06/23 to 02/08/23  C6- 02/27/23 and 02/28/23- 33% dose reduced- Day 3 ommitted  C7- 03/20 to 03/24/23- Only Temodar. Vinblastine and Cisplatin omitted completely.         Subjective   Please refer to \"Notes/Cancer History\" above for complete History of present illness.     Mr Gokul Harmon is doing well. No new issues. Wants to know scan results.     The visit was changed to phone visit/virtual visit in agreement with the patient. I am always available to meet with the patient in person should they wish to meet me.     Review of Systems:   Review of Systems   Constitutional:  Positive for fatigue. Negative for appetite change, chills and fever.   HENT:   Negative for hearing loss, mouth sores, sore throat, trouble swallowing " and voice change.    Eyes:  Negative for eye problems and icterus.   Respiratory:  Negative for chest tightness, hemoptysis and shortness of breath.    Cardiovascular:  Negative for chest pain and leg swelling.   Gastrointestinal:  Negative for blood in stool, constipation, diarrhea, nausea and vomiting.   Genitourinary:  Negative for difficulty urinating, dysuria, hematuria, nocturia and pelvic pain.    Musculoskeletal:  Negative for arthralgias, back pain, gait problem, myalgias and neck pain.   Skin:  Negative for itching and rash.   Neurological:  Negative for dizziness, gait problem, light-headedness, numbness and seizures.   Hematological:  Negative for adenopathy. Does not bruise/bleed easily.   Psychiatric/Behavioral:  Negative for confusion and depression. The patient is not nervous/anxious.          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      chlorpheniramine (Chlor-Trimeton) 4 mg tablet Take 1 tablet (4 mg) by mouth every 6 hours if needed.      furosemide (Lasix) 20 mg tablet Take 1 tablet (20 mg) by mouth once daily.      lidocaine-prilocaine (Emla) 2.5-2.5 % cream Apply topically.  Apply topically as needed to Mediport site 30-45 minutes before being accessed,      ondansetron ODT (Zofran-ODT) 4 mg disintegrating tablet Take 1  tablet (4 mg) by mouth every 8 hours if needed for nausea or vomiting.      predniSONE (Deltasone) 10 mg tablet Take 1 tablet (10 mg) by mouth 2 times a day.      temozolomide (Temodar) 100 mg capsule Take 1 capsule (100 mg total) by mouth.      temozolomide (Temodar) 140 mg capsule Take 1 capsule (140 mg total) by mouth.       No current facility-administered medications on file prior to visit.         Performance Status:  Symptomatic; fully ambulatory     Vitals and Measurements:   There were no vitals taken for this visit.      Physical Exam:   Physical Exam         Lab Results:  I have reviewed these laboratory results:     Lab Results   Component Value Date    WBC 2.8 (L) 05/10/2023    HGB 10.2 (L) 05/10/2023    HCT 30.3 (L) 05/10/2023     (H) 05/10/2023     05/10/2023         Chemistry    Lab Results   Component Value Date/Time     05/10/2023 1220    K 3.9 05/10/2023 1220     (H) 05/10/2023 1220    CO2 26 05/10/2023 1220    BUN 20 05/10/2023 1220    CREATININE 1.16 05/10/2023 1220    Lab Results   Component Value Date/Time    CALCIUM 9.5 05/10/2023 1220    ALKPHOS 80 05/10/2023 1220    AST 14 05/10/2023 1220    ALT 9 (L) 05/10/2023 1220    BILITOT 0.7 05/10/2023 1220            Lab Results   Component Value Date    TSH 1.80 01/16/2023        Radiology Result:  I have reviewed the latest Imaging in PACS and the findings are noted in this note. I discussed the results of the latest imaging with the patient. All previous imaging were reviewed at the time it was completed. Full records are available in the EMR for review as well.     MR brain w and wo IV contrast    Result Date: 11/10/2023  Impression: Possible rim enhancing 6 mm lesion within the right thalamus in region of punctate enhancement seen on previous MRI 07/27/2023. However, this lesion is only visualized on axial postcontrast images. No clear visualization on coronal/sagittal reformats or fat saturated postcontrast images. No  associated hemorrhage or edema. Close interval follow-up recommended.   No other lesion visualized. No acute intracranial abnormality. Mild microangiopathic disease noted.   Signed by: Mikguille Alex 11/10/2023 8:42 AM Dictation workstation:   [id number]        Pathology Results:  I have reviewed the full pathology report recorded in the EMR. The pertinent portions indicating diagnosis are listed here in the note. for details please refer to the full report recorded in the EMR.    Dermatopathology [May 18 2022 2:05PM] ([id number])     Specimens: SKIN, LEFT SUPERIOR FOREARM /SKIN, LEFT SUPERIOR LATERAL FOREARM /SKIN, LEFT FOREARM INFERIOR /Received in formalin is a tan piece of skin measuring [id number]. The     Name JONAH MELVIN     Accession #: [id number]   Pathologist:  ZEN JULES MD   Date of Procedure: 5/11/2022   Date Received: 5/12/2022   Date Reported 5/18/2022   Submitting Physician: ESTEPHANIA LANDEROS DO   Location: [facility] Other External #       Case is Amended   FINAL DIAGNOSIS    A. SKIN, LEFT SUPERIOR FOREARM, BIOPSY:   MELANOMA IN SITU, LENTIGO MALIGNA TYPE, PRESENT ON THE DEEP AND PERIPHERAL MARGIN, SEE NOTE.     Note: Microscopic examination reveals a specimen that extends into the mid reticular dermis. There is  dense solar elastosis, and there is an asymmetric proliferation of nested and single melanocytes along the dermal-epidermal junction. It appears that these three biopsies may be from the same lesion, and the synoptic report will reflect the pathology  of the three lesions.     B. SKIN, LEFT SUPERIOR LATERAL FOREARM, BIOPSY:   MALIGNANT MELANOMA, BRESLOW THICKNESS AT LEAST 1.8 MM, PRESENT ON THE DEEP AND PERIPHERAL MARGIN, SEE NOTE.     Note: Microscopic examination reveals a specimen  that extends into the subcutaneous fat. There are heavily pigmented epithelioid cells with dense solar elastosis. There also appears to be an area of vascular invasion. These   cells stain strongly with  antibodies against SOX10. All control slides  stain appropriately.     The lack of epidermal attachment raises the possibility of metastatic or recurrent melanoma. This case has been amended to change lymphovascular invasion from not identified to present in the synoptic report.      C. SKIN, LEFT FOREARM INFERIOR:   MALIGNANT MELANOMA, BRESLOW THICKNESS 4.3 MM, PRESENT ON THE PERIPHERAL MARGIN,   SEE NOTE.     Note: Microscopic examination reveals a specimen that extends into the subcutaneous fat. There is a proliferation  of nested and single atypical melanocytes throughout all layers of the epidermis. There is moderate solar elastosis, and there are heavily pigmented epithelioid and spindled cells in the dermis. The melanocytes stain with antibodies against SOX10. All  control slides stain appropriately.     Electronically Signed Out by BELTRAN JULES M.D.     DISEASE RELEVANT ALTERATIONS NOT DETECTED:   Negative for BRAF V600.      Assessment and Plan:   Assessment/Plan      Mr. Gokul Harmon is a 89 y.o. 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 Please see the evolution of the case listed above in the cancer history.     Today,   Mr. Harmon got his Brain MRI done which does not suggest any overt recurrence. He could not get his PET scan done as he ate that morning (when he should have been fasting). I will reorder the PET scan in 3 weeks. Otherwise he is doing OK without any new lesions. In fact he tells me that the lesion on the forearm is looking better.        DISCLAIMER:   In preparing for this visit and writing this note, I reviewed all the previous electronic medical records (labs, imaging and medical charts) of the patient available in the physician portal. Significant findings which helped in decision making are recorded  in this chart.     The plan was discussed with the patient. We gave him ample opportunities to ask questions. All questions were answered to his satisfaction and he  verbalized understanding.       Magnus Adams MD    Hematology and Oncology     Phone: [phone]  Fax: [phone].

## 2023-11-16 ENCOUNTER — TELEPHONE (OUTPATIENT)
Dept: ADMISSION | Facility: HOSPITAL | Age: 88
End: 2023-11-16
Payer: MEDICARE

## 2023-11-16 ASSESSMENT — ENCOUNTER SYMPTOMS
LIGHT-HEADEDNESS: 0
NAUSEA: 0
NUMBNESS: 0
DIARRHEA: 0
NECK PAIN: 0
CONSTIPATION: 0
MYALGIAS: 0
ADENOPATHY: 0
TROUBLE SWALLOWING: 0
HEMATURIA: 0
DEPRESSION: 0
NERVOUS/ANXIOUS: 0
SCLERAL ICTERUS: 0
VOICE CHANGE: 0
LEG SWELLING: 0
SORE THROAT: 0
SEIZURES: 0
VOMITING: 0
CHILLS: 0
BACK PAIN: 0
ARTHRALGIAS: 0
BRUISES/BLEEDS EASILY: 0
EYE PROBLEMS: 0
APPETITE CHANGE: 0
BLOOD IN STOOL: 0
DYSURIA: 0
SHORTNESS OF BREATH: 0
FATIGUE: 1
FEVER: 0
DIZZINESS: 0
CONFUSION: 0
DIFFICULTY URINATING: 0
CHEST TIGHTNESS: 0
HEMOPTYSIS: 0

## 2023-12-01 ENCOUNTER — HOSPITAL ENCOUNTER (OUTPATIENT)
Dept: RADIOLOGY | Facility: HOSPITAL | Age: 88
End: 2023-12-01
Payer: MEDICARE

## 2023-12-01 ENCOUNTER — APPOINTMENT (OUTPATIENT)
Dept: RADIOLOGY | Facility: HOSPITAL | Age: 88
End: 2023-12-01
Payer: MEDICARE

## 2023-12-04 ENCOUNTER — APPOINTMENT (OUTPATIENT)
Dept: RADIOLOGY | Facility: HOSPITAL | Age: 88
End: 2023-12-04
Payer: MEDICARE

## 2023-12-06 ENCOUNTER — APPOINTMENT (OUTPATIENT)
Dept: HEMATOLOGY/ONCOLOGY | Facility: CLINIC | Age: 88
End: 2023-12-06
Payer: MEDICARE

## 2023-12-08 ENCOUNTER — HOSPITAL ENCOUNTER (OUTPATIENT)
Dept: RADIOLOGY | Facility: HOSPITAL | Age: 88
Discharge: HOME | End: 2023-12-08
Payer: MEDICARE

## 2023-12-08 DIAGNOSIS — C43.9 MALIGNANT MELANOMA OF SKIN, UNSPECIFIED (MULTI): ICD-10-CM

## 2023-12-08 DIAGNOSIS — C43.62 MALIGNANT MELANOMA OF ARM, LEFT (MULTI): Primary | ICD-10-CM

## 2023-12-08 PROCEDURE — 78816 PET IMAGE W/CT FULL BODY: CPT | Mod: PET TUMOR SUBSQ TX STRATEGY | Performed by: NUCLEAR MEDICINE

## 2023-12-08 PROCEDURE — 3430000001 HC RX 343 DIAGNOSTIC RADIOPHARMACEUTICALS: Performed by: INTERNAL MEDICINE

## 2023-12-08 PROCEDURE — A9552 F18 FDG: HCPCS | Performed by: INTERNAL MEDICINE

## 2023-12-08 PROCEDURE — 78816 PET IMAGE W/CT FULL BODY: CPT | Mod: PS

## 2023-12-08 RX ORDER — FLUDEOXYGLUCOSE F 18 200 MCI/ML
14.2 INJECTION, SOLUTION INTRAVENOUS
Status: COMPLETED | OUTPATIENT
Start: 2023-12-08 | End: 2023-12-08

## 2023-12-08 RX ADMIN — FLUDEOXYGLUCOSE F 18 14.2 MILLICURIE: 200 INJECTION, SOLUTION INTRAVENOUS at 10:53

## 2023-12-11 ENCOUNTER — TELEPHONE (OUTPATIENT)
Dept: ADMISSION | Facility: HOSPITAL | Age: 88
End: 2023-12-11

## 2023-12-11 ENCOUNTER — TUMOR BOARD CONFERENCE (OUTPATIENT)
Dept: HEMATOLOGY/ONCOLOGY | Facility: HOSPITAL | Age: 88
End: 2023-12-11
Payer: MEDICARE

## 2023-12-11 NOTE — TUMOR BOARD NOTE
General Patient Information  Name:  Gokul Harmon  Evaluation #:  4  Conference Date:  12-  YOB: 1932  MRN:  [id number]  Program Physician(s):  Prieto Beltran  Referring Physician(s):  Wander Singh Ankit Mangla, Kevin Chaung      Summary   Stage:  cIIB ([id number])   Melanoma 5 year survival: 87%    Assessment:  Left forearm non-ulcerated melanoma, lentigo maligna and nodular types, Breslow: 4.3 mm, concern for possible metastatic or recurrent melanoma. PET/CT shows metastatic disease of left axillary lymph nodes, increased hypermetabolic activity of subcarcinal and hilar lymph nodes s/p negative FNA biopsy of subcarinal node. S/p 4 cycles of ipilimumab and nivolumab.  Progressive disease noted, so switched to chemotherapy, now s/p Temodar, Vinblastine, and Cisplatin.  S/p RT to the right forearm mass.    PET/CT showed a residual left forearm hypermetabolic focus, residual disease cannot be excluded.  Interval increase in hypermetabolic activity and size of a metastatic left axillary lymph node.  Stable to mildly improved hypermetabolic mediastinal lymph nodes consistent with metastasis.    Recommendation:  Consider LN biopsy.    Review Multidisciplinary Cutaneous Oncology Conference recommendation with patient. Continue routine follow up and total body skin exams with Leona Ribeiro.    Follow Up:  Wander Singh, Magnus Adams, Reginaldo Headley      History and Physical Exam  Dermatologic History:   89 y.o. male with a biopsy of the left forearm (3 biopsies of the same lesion) on 05/11/22 showing a non-ulcerated melanoma, lentigo maligna and nodular types, Breslow: 4.3 mm, lack of epidermal attachment. BRAF V600 negative.    PET/CT 06/10/22 shows hypermetabolic left axillary lymph nodes, moderately hypermetabolic subcarinal and AP window nodes, mild bilateral hilar lymph nodes. MRI brain 06/10/22 show no evidence of intracranial metastatic disease.    He underwent  bronchoscopy and biopsy of subcarinal node on 07/01/22 that did not identify malignant cells.  He began treatment with ipilimumab and nivolumab on 07/25/22, s/p 4 cycles.    Progressive disease was noted on the PET scan on 10/13/2022.  MRI brain showed 2 very small focii suspicious for mets (10/19/2022).  Started chemotherapy and after 2 cycles, his MRI and PET scan on 12/19/2022 showed tremendous improvement. Imaging on 03/10/23 showed continued improvement in the melanoma mass. In 03/20/23, infusions were eliminated to help with fatigue.  RT to the right forearm mass was given on 4/10/23 to 4/21/23 (36 Gy in 6 fractions).    PET scan done on 7/20/23 showed slightly progressive disease. MRI brain on 7/27/23 showed one questionable spot in the thalamus. Repeat MRI brain on 7/27/23 showed a questionable spot in the thalamus, but no other overt disease.  MRI brain on 11/10/23 was unremarkable.    PET/CT on 12-8-2023 showed interval resolution of the hypermetabolic activity of the left forearm with a residual left forearm hypermetabolic focus, residual disease cannot be excluded.  Interval increase in hypermetabolic activity and size of a metastatic left axillary lymph node.  Interval resolution of a right cervical lymph node hypermetabolic activity. Stable to mildly improved hypermetabolic mediastinal lymph nodes consistent with metastasis.  No other new sites of hypermetabolic metastatic disease.    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 FINE NEEDLE ASPIRATION LYMPH NODE 7:  NO MALIGNANT CELLS IDENTIFIED, SEE NOTE.    NOTE: The specimen consists of lymphocytes and numerous macrophages highlighted  by CD68. SOX10, Melan A, HMB 45 and S-100 are negative.  Slide(s) initially screened by a Cytotechnologist at [place] [id number]  Electronically Signed Out By NICOLE MAY MD  By the signature on this report, the individual or group listed as making the  Final Interpretation/Diagnosis certifies that they have reviewed this case.  Slide(s) initially screened by a Cytotechnologist at [facility]  Diagnostic interpretation performed at [place] [id number]          Report Status: Corrected  Name JONAH MELVIN  Accession #: [id number]  Pathologist: ZEN JULES MD  Date of Procedure: 5/11/2022  Date Received: 5/12/2022  Date Reported 5/18/2022  Submitting Physician: ESTEPHANIA LANDEROS DO  Location: [facility] Other External #  Case is Amended    FINAL DIAGNOSIS  A. SKIN, LEFT SUPERIOR FOREARM, BIOPSY:  MELANOMA IN SITU, LENTIGO MALIGNA TYPE, PRESENT ON THE DEEP AND PERIPHERAL  MARGIN, SEE NOTE.    Note: Microscopic examination reveals a specimen that extends into the mid  reticular dermis. There is dense solar elastosis, and there is an asymmetric  proliferation of nested and single melanocytes along the dermal-epidermal  junction.  It appears that these three biopsies may be from the same lesion, and the  synoptic report will reflect the pathology of the three lesions.    B. SKIN, LEFT SUPERIOR LATERAL FOREARM, BIOPSY:  MALIGNANT MELANOMA, BRESLOW THICKNESS AT LEAST 1.8 MM, PRESENT ON THE DEEP AND  PERIPHERAL MARGIN, SEE NOTE.    Note: Microscopic examination reveals a specimen that extends into the  subcutaneous fat. There are heavily  pigmented epithelioid cells with dense  solar elastosis. There also appears to be an area of vascular invasion. These  cells stain strongly with antibodies against SOX10. All control slides stain  appropriately.  The lack of epidermal attachment raises the possibility of metastatic or  recurrent melanoma. This case has been amended to change lymphovascular  invasion from not identified to present in the synoptic report.    C. SKIN, LEFT FOREARM INFERIOR:  MALIGNANT MELANOMA, BRESLOW THICKNESS 4.3 MM, PRESENT ON THE PERIPHERAL MARGIN,  SEE NOTE.    Note: Microscopic examination reveals a specimen that extends into the  subcutaneous fat. There is a proliferation of nested and single atypical  melanocytes throughout all layers of the epidermis. There is moderate solar  elastosis, and there are heavily pigmented epithelioid and spindled cells in  the dermis. The melanocytes stain with antibodies against SOX10. All control  slides stain appropriately.  Electronically Signed Out by ZEN JULES M.D.    Note  One or more of the reagents used to perform assays on this specimen MAY have  contained components considered to be analyte specific reagents (ASR's). ASR's  have not been cleared or approved by the U.S. Food and Drug Administration.  These assays were developed and their performance characteristics determined by  the Department of Pathology at [facility].  The FDA does not require this test to go through premarket FDA review. This  test is used for clinical purposes. It should not be regarded as  investigational or for research. This laboratory is certified under the  Clinical Laboratory Improvement Amendments (CLIA) as qualified to perform high  complexity clinical laboratory testing. The assays were performed with  appropriate positive and negative controls which stained appropriately.    CANCER SUMMARY REPORT  C. SKIN, LEFT FOREARM INFERIOR:  SPECIMEN  Procedure: Shave and  punch biopsies  Specimen Laterality: Left  TUMOR  Tumor Site: Skin of upper limb and shoulder: Left forearm  Histologic Type: Features of lentigo maligna melanoma and nodular  melanoma  Maximum Tumor (Breslow) Thickness (Millimeters): 4.3 mm  Ulceration: Not identified  Anatomic ([place]) Level: V (melanoma invades subcutis)  Mitotic Rate: None identified  Microsatellite(s): Not identified  Lymphovascular Invasion: Present  Neurotropism: Not identified  Tumor-Infiltrating Lymphocytes: Present, nonbrisk  Tumor Regression: Not identified  MARGINS  Margin Status for Invasive Melanoma: Invasive melanoma present at  margin  Margin(s) Involved by Invasive Melanoma: Peripheral  Deep  Margin Status for Melanoma in situ: Melanoma in situ present at margin  Margin(s) Involved by Melanoma in Situ: Peripheral  PATHOLOGIC STAGE CLASSIFICATION (pTNM, AJCC 8th Edition)  Reporting of pT categories is based on information available to the pathologist  at the time the report is issued. As per the AJCC (Chapter 1, 8th Ed.) it is  ??????????????????????????????????????????????????????????????????????????????????????????????????????????????????????????????????????????????????????????????????????????????  based upon all pertinent information, including but potentially not limited to  this pathology report.  pT Category: pT4a    ADDITIONAL FINDINGS  Additional Findings: None  ADDITIONAL TESTING  Representative Blocks: Normal Block: None  Tumor Block: A,B,C  Electronically Signed Out By ZEN JULES MD/[facility]  By the signature on this report, the individual or group listed as making the  Final Interpretation/Diagnosis certifies that they have reviewed this case.  Amendment Comments:  Amended: 5/18/2022  Reason: Typographical Error  Previous Signout Date: 5/17/2022  Clinical History:  A, B, C: Melanoma. A, B: Biopsies. C: Punch Biopsy.  Specimens Submitted As:  A: SKIN, LEFT SUPERIOR FOREARM  B: SKIN, LEFT SUPERIOR LATERAL FOREARM  C: SKIN,  LEFT FOREARM INFERIOR  Gross Description:  A: Received in formalin is a tan piece of skin measuring [id number]. The  specimen is inked and embedded in toto. B: Received in formalin is a tan piece  of skin measuring [id number]. The specimen is inked and embedded in toto. C:  Received in formalin is a tan-brown, cylindrical piece of skin measuring  [id number]. The specimen is inked and embedded in toto.  dcp/5/12/2022      Radiology  NM PET CT melanoma restaging   Interpreted By:  Jorge Luis Harris and Jiang Sirui   STUDY:  NM PET CT MELANOMA  RESTAGING;  12/8/2023 1:07 pm      INDICATION:  Signs/Symptoms:cancer.      Per clinical notes: 89-year-old male with history of unresectable  melanoma of the left forearm originally diagnosed in March 2022  treated with chemoradiation therapy.      COMPARISON:  PET-CT 07/30/2023      ACCESSION NUMBER(S):  [id number]      ORDERING CLINICIAN:  HEDY HAGEN      TECHNIQUE:  DIVISION OF NUCLEAR MEDICINE  POSITRON EMISSION TOMOGRAPHY (PET-CT)      The patient received an intravenous dose of 14.2 mCi of Fluorine-18  fluorodeoxyglucose (FDG). Positron emission tomographic (PET) images  from skull vertex to the feet were then acquired after a one hour  delay. Also acquired was a contemporaneous low dose non-contrast CT  scan performed for attenuation correction of PET images and anatomic  localization.  The PET and CT images were digitally fused for  display.  All images were acquired on a combined PET-CT scanner unit.  Some areas of FDG accumulation may be described in standardized  uptake value (SUV) units.      CODING:  Subsequent Treatment Strategy (PS)      CALIBRATION:  Dose Injection-to-Scan Interval (mins): 59 min  Mediastinal bloodpool SUV (normal 1.5-2.5): 1.7  Blood glucose: 93 mg/dL      FINDINGS:  HEAD AND NECK:  * No evidence of focal hypermetabolic lesion in the brain parenchyma,  noting that evaluation is limited because of the expected physiologic  diffuse FDG uptake in the  brain. * No focal hypermetabolic soft  tissue lesion is seen in the neck. *Interval resolution of the right  cervical lymph nodes.      CHEST:  * No focal hypermetabolic lesion is seen in the lung parenchyma.  *Stable to mildly improved hypermetabolic mediastinal lymph nodes,  for example a subcarinal lymph node with max SUV of 4.1, previously  measuring 5.1. *Interval increase in hypermetabolic activity and size  of the left axillary lymph node max SUV of 21.5 previously 18.5.      ABDOMEN AND PELVIS:  * No hypermetabolic soft tissue lesion is present in the abdomen and  pelvis. * No evidence of hypermetabolic lymphadenopathy.  * Physiologic radiotracer uptake is present in the liver and spleen  with excretion into the bowel loops and the genitourinary tract.      MUSCULOSKELETAL/EXTREMITIES:  *Interval resolution of the hypermetabolic activity of the left  forearm with a residual left forearm hypermetabolic focus with a max  SUV of 4.4. *No focal hypermetabolic lesion is seen in the axial or  appendicular skeleton to suggest osseous metastasis.      IMPRESSION:  1. Interval resolution of the hypermetabolic activity of the left  forearm with a residual left forearm hypermetabolic focus, residual  disease can not be excluded.  2. Interval increase in hypermetabolic activity and size of a  metastatic left axillary lymph node.  3. Interval resolution of a right cervical lymph node hypermetabolic  activity. Stable to mildly improved hypermetabolic mediastinal lymph  nodes consistent with metastasis.  4. No other new sites of hypermetabolic metastatic disease.    ___________________________________________________________________________________________________    MR brain w and wo IV contrast   Interpreted By:  Oralia Johnson,   STUDY:  MR BRAIN W AND WO IV CONTRAST      INDICATION:  Signs/Symptoms: restaging for stage 4 melanoma  C43.9: Melanoma      COMPARISON:  Brain MRI 07/27/2023.      ACCESSION  NUMBER(S):  [id number]      ORDERING CLINICIAN:  HEDY HAGEN      TECHNIQUE:  Multi-planar multi-sequential MR imaging of the brain was performed  before and after the intravenous administration of contrast. 14.5 ml  of Dotarem was administered (the balance of single use vial(s)  has/have been discarded).      FINDINGS:  In region of previously noted punctate enhancing lesion within the  right thalamus, there is an ill-defined rim enhancing lesion  measuring 6 mm (series 702, image 71). This is not definitively  visualized on fat saturated post lorenza images and poorly visualized on  coronal/sagittal images. No definite associated edema or hemorrhage.      No additional lesion visualized.      No acute infarct or intracranial hemorrhage.      Mild periventricular and subcortical white matter T2 FLAIR  hyperintensities, nonspecific, but likely representing chronic  microangiopathic changes.      No hydrocephalus.  No extra-axial fluid collections. The skull base  flow voids are present.      The visualized intraorbital contents are normal. Severe mucosal  thickening of the right maxillary sinus and moderate mucosal  thickening of the left maxillary sinus. The mastoid air cells are  clear. The visualized osseous structures, soft tissues and partially  visualized parotid glands appear normal. [place] versus  hemangioma within the right parietal calvarium, unchanged.      IMPRESSION:  Possible rim enhancing 6 mm lesion within the right thalamus in  region of punctate enhancement seen on previous MRI 07/27/2023.  However, this lesion is only visualized on axial postcontrast images.  No clear visualization on coronal/sagittal reformats or fat saturated  postcontrast images. No associated hemorrhage or edema. Close  interval follow-up recommended.      No other lesion visualized. No acute intracranial abnormality. Mild  microangiopathic disease  noted.    ___________________________________________________________________________________________________    MR brain w and wo IV contrast   Interpreted By:  CHARLENE VARELA MD  MRN: [id number]  Patient Name: JONAH MELVIN     STUDY:  MRI BRAIN W/WO CONTRAST     INDICATION:  Z00.00 Encounter for general adult medical examination without  abnormal findings     Restaging after chemotherapy.     COMPARISON:  Multiple brain MRIs most recent being 03/10/2023.     ACCESSION NUMBER(S):  [id number]     ORDERING CLINICIAN:  HEDY HAGEN     TECHNIQUE:  Multi-planar multi-sequential MR imaging of the brain was performed  before and after the intravenous administration of contrast. 16 ml of  Dotarem was administered (the balance of single use vial(s) has/have  been discarded).     FINDINGS:  Questionable punctate focus of enhancement in the right thalamus  (series 9, image 88), not definitively seen on prior exams. No  associated edema or hemorrhage.     Otherwise, no abnormal intracranial enhancing lesion.     No infarct.     No hydrocephalus.  No extra-axial fluid collections. The skull base  flow voids are present.     Moderate diffuse parenchymal volume loss.     The visualized intraorbital contents are normal. Severe mucosal  thickening of the right maxillary sinus. The mastoid air cells are  clear. The visualized osseous structures, soft tissues and partially  visualized parotid glands appear normal.     IMPRESSION:  Questionable punctate focus of enhancement in the right thalamus  (series 9, image 88), not definitively seen on prior exams. No  associated edema or hemorrhage. Attention on follow-up exams  recommended.     Otherwise, no abnormal intracranial enhancing lesion.    ___________________________________________________________________________________________________    MR neck soft tissue only w and wo IV contrast   Interpreted By:  CHARLENE VARELA MD  MRN: [id number]  Patient Name: JONAH MELVIN      STUDY:  MRI NECK WO/W     INDICATION:  melanoma staging  C43.9: Melanoma        COMPARISON:  PET-CT 07/20/2023. MRI brain 03/10/2023.     ACCESSION NUMBER(S):  [id number]     ORDERING CLINICIAN:  HEDY HAGEN     TECHNIQUE:  Multiplanar multisequence MR images of the neck were obtained before  and after the 15 ml of Dotarem was administered (the balance of  single use vial(s) has/have been discarded).     FINDINGS:  Aerodigestive structures: Normal. No evidence of abnormal enhancement.  Thyroid gland: Normal.  Parotid and submandibular glands: Normal.     Lymph nodes: Right level 5 B lymph node measuring 1.4 x 0.5 cm  (series 606, image 71) and a right level 3/5 lymph node measuring 1.1  x 0.5 cm (series 606, image 97), corresponding to lymph nodes with  increased uptake on PET-CT.     Vascular structures: Normal.     Osseous structures: No destructive osseous lesion.     Paranasal sinuses: Complete mucosal thickening/opacification of the  right maxillary sinus.  Mastoid air cells: Clear.     Partially visualized intracranial structures: Normal.     Partially visualized orbits: Normal     Partially visualized lung apices: Normal.     IMPRESSION:  Right level 5 B lymph node measuring 1.4 cm in right level 3/5 lymph  node measuring 1.1 cm, corresponding to hypermetabolic lymph nodes on  PET-CT 07/20/2023. Remainder of the examination is within normal  limits. No lesion within the partially visualized brain.    ___________________________________________________________________________________________________    NM PET CT bone whole body   Interpreted By:  CYNTHIA KAY MD and PHAM MOSER DO  MRN: [id number]  Patient Name: JONAH MELVIN     STUDY:  PET/CT MELANOMA INITIAL STAGING;  7/20/2023 3:46 pm     INDICATION:  melanoma staging  C43.9: Melanoma. Per EMR: History of unresectable  melanoma of the left forearm diagnosed in March 2022. Patient was  started on immunotherapy, after 4 cycles progressive disease  was  noted on PET-CT dated 10/13/2022. Patient then had a trial of  chemotherapy and after 2 cycles demonstrated improvement on PET-CT  dated 12/19/2022 and continued improvement with chemotherapy on  PET-CT dated 03/10/2023. Since then, radiation therapy was provided  to the right forearm mass and infusions were held. Restaging scan.     COMPARISON:  PET-CT dated 03/10/2023.     ACCESSION NUMBER(S):  [id number]     ORDERING CLINICIAN:  HEDY HAGEN     TECHNIQUE:  DIVISION OF NUCLEAR MEDICINE  POSITRON EMISSION TOMOGRAPHY (PET-CT)     The patient received an intravenous dose of 14.9 mCi of Fluorine-18  fluorodeoxyglucose (FDG).  The patient was placed in a dark quiet  room. Positron emission tomographic (PET) images from skull vertex to  the feet were then acquired after a one hour delay. Also acquired was  a contemporaneous low dose non-contrast CT scan performed for  attenuation correction of PET images and anatomic localization.  The  PET and CT images were digitally fused for display.  All images were  acquired on a combined PET-CT scanner unit.  Some areas of FDG  accumulation may be described in standardized uptake value (SUV)  units.     CODING:  Subsequent Treatment Strategy (PS)     CALIBRATION:  Dose Injection-to-Scan Interval (mins): 72 min  Mediastinal bloodpool SUV (normal 1.5-2.5): 1.6  Blood glucose: 106 mg/dL     FINDINGS:  HEAD AND NECK:  No evidence of focal hypermetabolic lesion in the brain parenchyma,  noting that evaluation is limited because of the expected physiologic  diffuse FDG uptake in the brain.  No focal hypermetabolic soft tissue lesion is seen in the neck.  Similar appearance of mild FDG avidity associated a few right level 3  lymph nodes (max SUV 2.1, previously 1.7).     CHEST:  No focal hypermetabolic lesion is seen in the lung parenchyma.  Marked interval increased FDG avidity associated with a left axillary  lymph node (max SUV 18.5, previously 3.2). Interval increased  FDG  avidity associated with a subaortic lymph node (max SUV 4.3,  previously 2.7). Interval increased FDG avidity associated with a  right lower paratracheal lymph node (max SUV 3.7, previously 2.4).  Interval increased FDG avidity associated with a subcarinal lymph  node (max SUV 5.9, previously 4.7). New mild FDG avidity associated  with right hilar lymph nodes (max SUV 3.1). New mild FDG avidity  associated with right axillary lymph nodes (max SUV 3.3).     ABDOMEN AND PELVIS:  No hypermetabolic soft tissue lesion is present in the abdomen and  pelvis.  No evidence of hypermetabolic lymphadenopathy.  Physiologic radiotracer uptake is present in the liver and spleen  with excretion into the bowel loops and the genitourinary tract.     MUSCULOSKELETAL/EXTREMITIES:  Interval worsening of FDG avidity is seen within a left forearm mass  with a max SUV of 3.6, previously 2.8.  No hypermetabolic foci within the visualized axial or appendicular  skeleton.     IMPRESSION:  1. Interval increased FDG avidity associated with a left forearm  mass. Findings are favored to represent progression of disease,  although less commonly post treatment changes may be superimposed at  this time interval status post radiation therapy.  2. Interval progression of hypermetabolic lymphadenopathy of the  thorax as described above and similar to mild interval worsening of  hypermetabolic right level 3 cervical adenopathy.    ___________________________________________________________________________________________________    MR brain w and wo IV contrast   Interpreted By:  MATI HAYDEN MD  MRN: [id number]  Patient Name: JONAH MELVIN     STUDY:  MRI BRAIN W/WO CONTRAST;  3/10/2023 3:06 pm     INDICATION:  Unresectable advanced melanoma, metastatic to brain. Restage after  6th cycle of chemotherapy.  C43.9: Malignant melanoma of skin C43.9:  Melanoma metastatic to lymph node.     COMPARISON:  None.  December 2022.     ACCESSION  NUMBER(S):  [id number]     ORDERING CLINICIAN:  HEDY HAGEN     TECHNIQUE:  The brain was studied in the sagittal axial and coronal planes  utilizing FLAIR, T1 and T2 weighted images   Following intravenous  injection of gadolinium contrast, T1 weighted fat suppressed  multiplanar images were also performed.     FINDINGS:  There is slight prominence of the cortical sulci and sylvian  fissures. There is mild ventricular dilatation.  There are a few  scattered foci of abnormal signal within the basal ganglia and  subcortical white matter bilaterally.  These are compatible with  minimal small vessel ischemic changes.  These nonspecific findings  could also be produced by a demyelinating or post inflammatory  process.     Unchanged opacified and slightly hypoplastic right maxillary sinus  consistent with chronic obstruction or previous trauma. The  visualized skull base paranasal sinuses and orbital structures are  unremarkable. Diffusion weighted images and associated ADC maps of  the brain were unremarkable.  There is no evidence of diffusion  restriction to suggest the presence of acute infarction. Gradient  echo T2 weighted images fail to demonstrate hemosiderin deposition or  other evidence of hemorrhage.     IMPRESSION  * There is no evidence of mass, cerebral infarction or hemorrhage.    ___________________________________________________________________________________________________    NM PET CT melanoma restaging   Interpreted By:  VIKTORIYA CHERRY MD and MARY ANN KWONG MD  MRN: [id number]  Patient Name: JONAH MELVIN     STUDY:  PET/CT MELANOMA, RESTAGING;  3/10/2023 2:43 pm     INDICATION:  Unresectable advanced melanoma, metastatic to brain. Restage after  6th cycle of chemotherapy.  C43.9: Malignant melanoma of skin C43.9:  Melanoma metastatic to lymph node.     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 Had evidence of progressive tumor after 4 cycles. Last  cycle was on 02/28/2023. New regimen was started on 11/14/2022. Has  metastatic disease to the brain which were negative on same day MRI  brain.     COMPARISON:  PET CT 12/19/2022, brain MRI 12/19/2022 and 03/10/2023     ACCESSION NUMBER(S):  [id number]     ORDERING CLINICIAN:  HEDY HAGEN     TECHNIQUE:  DIVISION OF NUCLEAR MEDICINE  POSITRON EMISSION TOMOGRAPHY (PET-CT)     The patient received an intravenous dose of 12.6 mCi of Fluorine-18  fluorodeoxyglucose (FDG).  The patient was placed in a dark quiet  room. Positron emission tomographic (PET) images from skull vertex to  the feet were then acquired after a one hour delay. Also acquired was  a contemporaneous low dose non-contrast CT scan performed for  attenuation correction of PET images and anatomic localization.  The  PET and CT images were digitally fused for display.  All images were  acquired on a combined PET-CT scanner unit.  Some areas of FDG  accumulation may be described in standardized uptake value (SUV)  units.     CODING:  Subsequent Treatment Strategy (PS)     CALIBRATION:  Dose Injection-to-Scan Interval (mins): 67 min  Mediastinal blood pool: 1.64  Blood glucose: 86 mg/dL     FINDINGS:  HEAD AND NECK:  No evidence of focal hypermetabolic lesion in the brain parenchyma,  noting that evaluation is limited because of the expected physiologic  diffuse FDG uptake in the brain.  No focal hypermetabolic soft tissue lesion is seen in the neck.  Interval decrease in the size and hypermetabolic activity of a right  level 3 hypermetabolic lymph node with SUV max of 1.4 compared to 2.3  previously. Otherwise, no hypermetabolic cervical lymphadenopathy is  present.        CHEST:  No focal hypermetabolic lesion is seen in the lung parenchyma.  Compared to prior PET CT on 12/19/2022, there has been interval  decrease in the hypermetabolic activity associated with a station 7  lymph node  (SUV max of 4.7 compared to 6.4). There has also been  interval decrease in the size and hypermetabolic activity involving  bilateral lower paratracheal lymph nodes (SUV max of 2.7 compared to  3.8 previously. Note is made of decreased hypermetabolic activity of  previously noted right hilar lymph node (SUV max of 2.3 compared to  2.9 previously.  Slight interval decrease in the size and hypermetabolic activity  involving enlarged left axillary lymph nodes, with SUV max of 2.5  compared to 5.3 previously.  Right IJ MediPort tip is at the superior cavoatrial junction     ABDOMEN AND PELVIS:  No hypermetabolic soft tissue lesion is present in the abdomen and  pelvis.  No evidence of hypermetabolic lymphadenopathy.  Physiologic radiotracer uptake is present in the liver and spleen  with excretion into the bowel loops and the genitourinary tract.  Interval resolution in focal hypermetabolism within the rectum.     MUSCULOSKELETAL/EXTREMITIES:  Interval decrease in the size and hypermetabolic activity within a  left forearm lesion, now with SUV max of 2.8 compared to 4.0  previously.  Interval resolution of a focus of hypermetabolism within the  superficial soft tissues of the distal posterolateral left calf.  Diffusely increased FDG uptake is now seen in the bone marrow within  the axial skeleton, likely representing marrow hyperplasia in the  setting of the patient's chemotherapeutic treatment.        IMPRESSION:  Compared to prior examination on 12/19/2022, there has been interval  decrease in the hypermetabolic activity within the left forearm  lesion, at the site of patient's known melanoma.  There has been interval decrease in the size and hypermetabolic  activity of left axillary, mediastinal, right hilar, and right  cervical lymph nodes with residual metabolic activity consistent with  residual viable tumor. Otherwise, no evidence of distant  hypermetabolic disease  identified.    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     Per clinical notes-89-year-old male with a history of unresectable  melanoma of the left forearm originally diagnosed in May 2022 status  post 4 cycles of immunotherapy through September 2022. The patient  was initiated on CVT chemotherapy in mid November 2022. MRI for  restaging.     COMPARISON:  MRI brain dated 10/20/2022     ACCESSION NUMBER(S):  [id number]     ORDERING CLINICIAN:  HEDY HAGEN     TECHNIQUE:  Axial T2, FLAIR, DWI, gradient echo T2 and T1 weighted images of  brain were acquired. Post contrast T1 weighted images were acquired  after administration of 15 cc Dotarem gadolinium based intravenous  contrast.     FINDINGS:  The previously identified punctate foci of enhancement within the  left corona radiata and anterior left insula are not definitively  visualized on the current examination. There is no new abnormal  parenchymal enhancement identified. Minimal scattered nonspecific  periventricular and subcortical white matter T2 hyperintense signal  changes are unchanged and likely relating to chronic small vessel  ischemic changes. There is no mass effect or midline shift.     There is unchanged moderate ventricular and sulcal prominence,  compatible with age-related cerebral volume loss. Basal cisterns are  patent. There is no abnormal extra-axial fluid collection.     The major intracranial flow voids are patent. The bilateral orbits  are unremarkable. There is similar complete opacification of the  right maxillary sinus. There is minimal superimposed  paranasal sinus  mucosal thickening. Mastoid air cells are clear. There is unchanged  appearance of a subcentimeter T1 hyperintense lesion along the  superior right calvarium near the vertex.     IMPRESSION:  Previously identified punctate foci of enhancement within the left  corona radiata and insula are not visualized on the current exam. No  new abnormal enhancement or signal identified to suggest progressive  disease.    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 Per EMR: 89-year-old  male with history of left forearm melanoma with lymph node and brain  metastases. Status post treatment with CHECKMATE 511 protocol and CVT  regimen.     COMPARISON:  PET-CT melanoma, 10/13/2022     ACCESSION NUMBER(S):  [id number]     ORDERING CLINICIAN:  HEDY HAGEN     TECHNIQUE:  DIVISION OF NUCLEAR MEDICINE  POSITRON EMISSION TOMOGRAPHY (PET-CT)     The patient received an intravenous dose of 11.53 mCi of Fluorine-18  fluorodeoxyglucose (FDG).  The patient was placed in a dark quiet  room. Positron emission tomographic (PET) images from skull vertex to  the feet were then acquired after a one hour delay. Also acquired was  a contemporaneous low dose non-contrast CT scan performed for  attenuation correction of PET images and anatomic localization.  The  PET and CT images were digitally fused for display.  All images were  acquired on a combined PET-CT scanner unit.  Some areas of FDG  accumulation may be described in standardized uptake value (SUV)  units.     CODING:  Subsequent Treatment Strategy (PS)     CALIBRATION:  Dose Injection-to-Scan  Interval (mins): 68 min  Mediastinal bloodpool SUV (normal 1.5-2.5): 1.8  Blood glucose: 94 mg/dL     FINDINGS:  HEAD AND NECK:  No evidence of focal hypermetabolic lesion in the brain parenchyma,  noting that evaluation is limited because of the expected physiologic  diffuse FDG uptake in the brain.  No focal hypermetabolic soft tissue lesion is seen in the neck.  Again seen are faint hypermetabolic right level 3 lymph nodes with a  maximum SUV of 2.3 in comparison to 2.6 on prior PET-CT.     CHEST:  No focal hypermetabolic lesion is seen in the lung parenchyma.  Again seen are multiple hypermetabolic mediastinal, hilar, and  axillary lymph nodes, for example:  * a left paratracheal lymph node with a maximum SUV of 2.3 (unchanged  compared to prior PET-CT)  * a subaortic lymph node maximum SUV 3.8 (previously 3.2) and which  appears mildly increased in size  * a right paratracheal lymph node with a maximum SUV of 3.2  (previously 2.1), which appears mildly increased in size  * a right hilar lymph node with a maximum SUV of 2.9 (previously 3.1)  * subcarinal lymph node with a maximum SUV of 6.6 (previously 6.1)  * multiple bulky left axillary lymph nodes with a maximum SUV of 5.9  (previously 10.8).     ABDOMEN AND PELVIS:  There is interval decrease in focal hypermetabolism within the rectum  with a max SUV of 4.4 in comparison to 5.5 on prior PET-CT. There is  also resolution of focal hypermetabolic activity within the left  testicle. No new foci of hypermetabolic activity are identified.  No evidence of hypermetabolic lymphadenopathy.  Physiologic radiotracer uptake is present in the liver and spleen  with excretion into the bowel loops and the genitourinary tract.     MUSCULOSKELETAL/EXTREMITIES:  There is interval decrease in size and hypermetabolic activity within  the left forearm lesion, which currently demonstrates a maximum SUV  of 6.0 in comparison to 15. The previous described focus of  hypermetabolism  within the left distal abductor caty muscle is not  visualized on the current study. There is a new nonspecific focus of  hypermetabolism within the superficial soft tissue of the distal  posterolateral left calf.  Diffusely increased FDG uptake is now seen in the bone marrow within  the axial skeleton, likely representing marrow hyperplasia in the  setting of the patient's chemotherapeutic treatment.     IMPRESSION:  1. There has been interval decrease in hypermetabolic activity within  the left forearm lesion and within right hilar and left axillary  lymph nodes but with stable to mildly increased hypermetabolic  activity within mediastinal lymph nodes. These findings may be  suggestive of a mixed treatment response.  2. Mild interval decrease in hypermetabolic activity within the  rectum in comparison to the prior exam.  3. New, nonspecific superficial focus of hypermetabolism of the skin  of the distal posterolateral left calf. Correlation with physical  exam is recommended.    ___________________________________________________________________________________________________    MR brain w and wo IV contrast   MRN: [id number]  Patient Name: JONAH MELVIN     STUDY:  MRI BRAIN W/WO CONTRAST;  10/20/2022 10:24 am     INDICATION:  worsening disease seen on PET scan done 10/13, r/o brain mets  C43.9:  Melanoma metastatic to lymph node C43.9: Melanoma C43.60: Malignant  melanoma of arm.     COMPARISON:  PET-CT 10/13/2022 and MR brain 06/10/2022     ACCESSION NUMBER(S):  [id number]     ORDERING CLINICIAN:  HEDY HAGEN     TECHNIQUE:  Axial T2, FLAIR, DWI, gradient echo T2 and sagittal and coronal T1  weighted images of brain were acquired. Post contrast T1 weighted  images were acquired after administration of 15 mL of Dotarem  intravenous contrast.     FINDINGS:  There is a new punctate focus of enhancement within the left corona  radiata (image 94 of 150, series 702) and a new punctate focus of  enhancement within  the left subinsular region (image 90 of 150,  series 702) worrisome for metastatic disease.     No diffusion restriction abnormality to suggest acute infarct. No  abnormal enhancement. Generalized parenchymal volume loss with  prominence of the ventricles and sulci, consistent with patient's  age. The basal cisterns are patent. Similar degree of nonspecific  subcortical, periventricular, and pontine T2 signal hyperintensities  likely secondary to microangiopathy. No abnormal extra-axial fluid  collection, mass effect or midline shift.     Again seen is a subcentimeter T1 hyperintense lesion within the right  frontal calvarium unchanged.     Similar opacification of the right maxillary sinus. The remaining  paranasal sinuses and mastoid air cells are clear.     IMPRESSION:  There are 2 new punctate foci of enhancement within the left corona  radiata and left subinsular region worrisome for metastatic disease.  No significant surrounding edema.     Similar nonspecific subcentimeter T1 hyperintense lesion within the  right frontal calvarium, unchanged.     A notify message was sent.    ___________________________________________________________________________________________________    NM PET CT bone whole body   MRN: [id number]  Patient Name: JONAH MELVIN     STUDY:  PET/CT MELANOMA INITIAL STAGING;  10/13/2022 6:13 pm     INDICATION:  Unresectable locally advanced melanoma. s/p induction therapy with  Ipilimumab and Nivolumab. Assess response in therapy.  C43.9:  Malignant melanoma of skin C43.9: Melanoma metastatic to lymph node.  History of unresectable melanoma of the left forearm diagnosed in May  2022 status post 4 cycles of immunotherapy last cycle on 09/28/2022.  Last PET-CT on 06/10/2022 demonstrated the primary malignancy of the  left forearm, left axillary metastatic wanda disease and suspicious  subcarinal and AP window lymph nodes as well as bilateral hilar lymph  nodes.     COMPARISON:  PET-CT dated  06/10/2022.     ACCESSION NUMBER(S):  [id number]     ORDERING CLINICIAN:  HEDY HAGEN     TECHNIQUE:  DIVISION OF NUCLEAR MEDICINE  POSITRON EMISSION TOMOGRAPHY (PET-CT)     The patient received an intravenous dose of 12.62 mCi of Fluorine-18  fluorodeoxyglucose (FDG).  The patient was placed in a dark quiet  room. Positron emission tomographic (PET) images from skull vertex to  the feet were then acquired after a one hour delay. Also acquired was  a contemporaneous low dose non-contrast CT scan performed for  attenuation correction of PET images and anatomic localization.  The  PET and CT images were digitally fused for display.  All images were  acquired on a combined PET-CT scanner unit.  Some areas of FDG  accumulation may be described in standardized uptake value (SUV)  units.     CODING:  Subsequent Treatment Strategy (PS)     CALIBRATION:  Dose Injection-to-Scan Interval (mins): 87 min  Mediastinal bloodpool SUV (normal 1.5-2.5): 1.7  Blood glucose: 106 mg/dL     FINDINGS:  HEAD AND NECK:  No evidence of focal hypermetabolic lesion in the brain parenchyma,  noting that evaluation is limited because of the expected physiologic  diffuse FDG uptake in the brain.  No focal hypermetabolic soft tissue lesion is seen in the neck.  There is increased hypermetabolic activity associated with two right  level 3 cervical chain lymph nodes with a max SUV of 2.6, previously  1.6.     CHEST:  No focal hypermetabolic lesion is seen in the lung parenchyma.  There is overall increased hypermetabolic activity associated with  multiple mediastinal and hilar lymph nodes, for instance: Increased  hypermetabolic activity associated with a left upper paratracheal  lymph node (max SUV 2.3, previously 2.0, increased hypermetabolic  activity associated with a subaortic lymph node (max SUV 3.2,  previously 2.3), increased hypermetabolic activity associated with a  subcarinal lymph node (max SUV 6.2, previously 4.4),  increased  hypermetabolic activity associated with a right hilar lymph node (max  SUV 3.1, previously 2.8). Additionally, interval increased  hypermetabolic activity associated with multiple enlarging and new  left axillary lymph nodes with a max SUV of 10.8 within the inferior  most left axillary lymph node this lymph node measures approximately  2.3 x 1.5 cm, previously nonmeasurable.     ABDOMEN AND PELVIS:  There is nonspecific increased hypermetabolic activity associated  with the left testicle which is new since prior exam (max SUV of  5.5). There is also nonspecific increased hypermetabolic activity  associated with the rectum (max SUV of 5.5), attention on follow-up  recommended.  No evidence of hypermetabolic lymphadenopathy.  Physiologic radiotracer uptake is present in the liver and spleen  with excretion into the bowel loops and the genitourinary tract.     MUSCULOSKELETAL/EXTREMITIES:  Interval increased size of patient's left forearm lesion measuring  approximately 5.1 x 1.7 cm, previously measuring 3.5 x 1.2 cm (SUV of  15, previously 17). There is new mild metabolic activity associated  with the distal left abductor caty muscle (max SUV 2.1). No focal  hypermetabolic lesion is seen in the axial or appendicular to suggest  osseous metastasis.     IMPRESSION:  1. Overall worsening of metastatic tumor burden as evidence by  increased size and hypermetabolic activity associated with patient's  primary left forearm lesion, left axillary lymph nodes as well as  multiple right level 3 cervical lymph node chain lymph nodes and  mediastinal/hilar lymph nodes as described above.  2. New nonspecific increased hypermetabolic activity associated with  the left testicle. Further evaluation with testicular ultrasound may  be of value.  3. New mild hypermetabolic activity associated with the distal left  abductor caty muscle. MRI of the left leg may be of further value  as clinically indicated.  4. Nonspecific  increased hypermetabolic activity associated with the  rectum. Findings may represent physiologic activity, but attention on  follow-up may be of value.    ___________________________________________________________________________________________________    Report Status: Final  Facility:  Report Date and Time: 06/10/2022 15:53  Status: FINAL  MRN: [id number]  Patient Name: JONAH MELVIN  STUDY:  PET/CT MELANOMA INITIAL STAGING; 6/10/2022 1:45 pm  INDICATION:  pt with large tumor left arm- path shows possible in-transit or stage  3 melanoma- staging needed C43.62: Malignant melanoma of arm, left.  89-year-old male with stage III melanoma, presenting for staging.  Same day MRI brain negative for intracranial metastatic disease.  COMPARISON:  Same day MRI brain  ACCESSION NUMBER(S):  [id number]    FINDINGS:  HEAD AND NECK:  No evidence of focal hypermetabolic lesion in the brain parenchyma,  noting that evaluation is limited because of the expected physiologic  diffuse FDG uptake in the brain.  No focal hypermetabolic soft tissue lesion is seen in the neck.  No hypermetabolic cervical lymphadenopathy is present.    CHEST:  There are mild diffuse areas of parenchymal scarring throughout the  lungs. No discrete hypermetabolic pulmonary nodule.  Pathologically enlarged left axillary lymph nodes demonstrate intense  hypermetabolic activity, with a max SUV of 23.5 in the more anterior  node.  Additional enlarged subcarinal hypermetabolic node is noted with max  SUV of 4.4. Prominent mildly metabolic AP window lymph nodes are also  present with max SUV 2.3.  Smaller bilateral hilar lymph nodes demonstrate max SUV of 2.8.    ABDOMEN AND PELVIS:  No hypermetabolic soft tissue lesion is present in the abdomen and  pelvis.  No evidence of hypermetabolic lymphadenopathy.  Physiologic radiotracer uptake is present in the liver and spleen  with excretion into the bowel loops and the genitourinary tract.  Brachytherapy seeds are  noted within the prostate.    MUSCULOSKELETAL/EXTREMITIES:  No focal hypermetabolic lesion is seen in the axial or appendicular  to suggest osseous metastasis.  Superficial soft tissue lesion at the anterior left forearm measuring  4.4 x 1.2 cm demonstrates intense hypermetabolic activity, max SUV of  17.    IMPRESSION:  1. Hypermetabolic soft tissue lesion of the left forearm, compatible  with primary malignancy.  2. Hypermetabolic left axillary lymph nodes, consistent with  metastatic wanda disease.  3. Moderately hypermetabolic subcarinal and AP window nodes  concerning for additional sites of wanda metastases.  4. Mild bilateral hilar lymph nodes, which could represent early  wanda metastatic disease versus inflammatory etiology.  5. No hypermetabolic disease throughout the remainder of the body.    ___________________________________________________________________________________________________      Report Status: Final  Facility:  Report Date and Time: 06/10/2022 11:44  Status: FINAL  MRN: [id number]  Patient Name: JONAH MELVIN  STUDY:  MRI BRAIN W/WO CONTRAST; 6/10/2022 11:26 am  INDICATION:  pt with large tumor left arm- path shows possible in-transit or stage  3 melanoma- staging needed C43.62: Malignant melanoma of arm, left.  COMPARISON:  None.  ACCESSION NUMBER(S):  [id number]  ORDERING CLINICIAN:  BAN LEWIS  TECHNIQUE:  Axial T2, FLAIR, DWI, gradient echo T2 and sagittal and coronal T1  weighted images of brain were acquired. Post contrast T1 weighted  images were acquired after administration of 16 mL Dotarem gadolinium  based intravenous contrast.    FINDINGS:  Diffusion-weighted imaging demonstrates no evidence of an acute  infarct.  Ventricles, cortical sulci and basal cisterns are within normal  limits given the patient's stated age. There is no extra-axial fluid  collection, mass effect or midline shift. Mild degree of nonspecific  patchy subcortical and periventricular T2 and FLAIR  hyperintense  signal as well as signal within the dayne is presumed microangiopathy.  Major intracranial flow voids at the skull base are unremarkable.  Opacification of the right maxillary sinus with secretions may  represent acute sinusitis in the appropriate clinical setting.  Paranasal sinuses and mastoid air cells are otherwise predominantly  clear.  Subcentimeter T1 hyperintense lesion within the right frontal  calvarium saturates on the fat sat images and may represent a  hemangioma.  Cerebellar tonsils are above the foramen magnum. Pituitary and sella  are not enlarged. No abnormal susceptibility artifact. No abnormal  intraparenchymal enhancement.    IMPRESSION:  No evidence of intracranial metastatic disease.  Opacification of the right frontal sinus with frothy secretions may  represent acute sinusitis in the appropriate clinical setting.  Mild degree of nonspecific white matter changes compatible with  microangiopathy.

## 2023-12-13 ENCOUNTER — LAB (OUTPATIENT)
Dept: LAB | Facility: CLINIC | Age: 88
End: 2023-12-13
Payer: MEDICARE

## 2023-12-13 ENCOUNTER — OFFICE VISIT (OUTPATIENT)
Dept: HEMATOLOGY/ONCOLOGY | Facility: CLINIC | Age: 88
End: 2023-12-13
Payer: MEDICARE

## 2023-12-13 VITALS
BODY MASS INDEX: 29.3 KG/M2 | SYSTOLIC BLOOD PRESSURE: 110 MMHG | HEART RATE: 77 BPM | RESPIRATION RATE: 16 BRPM | DIASTOLIC BLOOD PRESSURE: 58 MMHG | WEIGHT: 165.79 LBS | OXYGEN SATURATION: 95 %

## 2023-12-13 DIAGNOSIS — R59.0 LYMPHADENOPATHY, HILAR: ICD-10-CM

## 2023-12-13 DIAGNOSIS — C43.62 MALIGNANT MELANOMA OF ARM, LEFT (MULTI): ICD-10-CM

## 2023-12-13 LAB
ALBUMIN SERPL BCP-MCNC: 4 G/DL (ref 3.4–5)
ALP SERPL-CCNC: 83 U/L (ref 33–136)
ALT SERPL W P-5'-P-CCNC: 12 U/L (ref 10–52)
ANION GAP SERPL CALC-SCNC: 12 MMOL/L (ref 10–20)
AST SERPL W P-5'-P-CCNC: 16 U/L (ref 9–39)
BASOPHILS # BLD AUTO: 0.02 X10*3/UL (ref 0–0.1)
BASOPHILS NFR BLD AUTO: 0.5 %
BILIRUB SERPL-MCNC: 0.7 MG/DL (ref 0–1.2)
BUN SERPL-MCNC: 16 MG/DL (ref 6–23)
CALCIUM SERPL-MCNC: 9.6 MG/DL (ref 8.6–10.6)
CHLORIDE SERPL-SCNC: 106 MMOL/L (ref 98–107)
CO2 SERPL-SCNC: 28 MMOL/L (ref 21–32)
CREAT SERPL-MCNC: 1.03 MG/DL (ref 0.5–1.3)
EOSINOPHIL # BLD AUTO: 0.2 X10*3/UL (ref 0–0.4)
EOSINOPHIL NFR BLD AUTO: 4.9 %
ERYTHROCYTE [DISTWIDTH] IN BLOOD BY AUTOMATED COUNT: 13.9 % (ref 11.5–14.5)
GFR SERPL CREATININE-BSD FRML MDRD: 69 ML/MIN/1.73M*2
GLUCOSE SERPL-MCNC: 65 MG/DL (ref 74–99)
HCT VFR BLD AUTO: 38.6 % (ref 41–52)
HGB BLD-MCNC: 12.5 G/DL (ref 13.5–17.5)
IMM GRANULOCYTES # BLD AUTO: 0 X10*3/UL (ref 0–0.5)
IMM GRANULOCYTES NFR BLD AUTO: 0 % (ref 0–0.9)
INR PPP: 1 (ref 0.9–1.1)
LDH SERPL L TO P-CCNC: 129 U/L (ref 84–246)
LYMPHOCYTES # BLD AUTO: 0.91 X10*3/UL (ref 0.8–3)
LYMPHOCYTES NFR BLD AUTO: 22.4 %
MCH RBC QN AUTO: 32.5 PG (ref 26–34)
MCHC RBC AUTO-ENTMCNC: 32.4 G/DL (ref 32–36)
MCV RBC AUTO: 100 FL (ref 80–100)
MONOCYTES # BLD AUTO: 0.41 X10*3/UL (ref 0.05–0.8)
MONOCYTES NFR BLD AUTO: 10.1 %
NEUTROPHILS # BLD AUTO: 2.52 X10*3/UL (ref 1.6–5.5)
NEUTROPHILS NFR BLD AUTO: 62.1 %
NRBC BLD-RTO: ABNORMAL /100{WBCS}
PLATELET # BLD AUTO: 197 X10*3/UL (ref 150–450)
POTASSIUM SERPL-SCNC: 4 MMOL/L (ref 3.5–5.3)
PROT SERPL-MCNC: 7 G/DL (ref 6.4–8.2)
PROTHROMBIN TIME: 10.7 SECONDS (ref 9.8–12.8)
RBC # BLD AUTO: 3.85 X10*6/UL (ref 4.5–5.9)
SODIUM SERPL-SCNC: 142 MMOL/L (ref 136–145)
WBC # BLD AUTO: 4.1 X10*3/UL (ref 4.4–11.3)

## 2023-12-13 PROCEDURE — 1036F TOBACCO NON-USER: CPT | Performed by: INTERNAL MEDICINE

## 2023-12-13 PROCEDURE — 80053 COMPREHEN METABOLIC PANEL: CPT | Performed by: INTERNAL MEDICINE

## 2023-12-13 PROCEDURE — 85610 PROTHROMBIN TIME: CPT | Performed by: INTERNAL MEDICINE

## 2023-12-13 PROCEDURE — 99215 OFFICE O/P EST HI 40 MIN: CPT | Performed by: INTERNAL MEDICINE

## 2023-12-13 PROCEDURE — 85025 COMPLETE CBC W/AUTO DIFF WBC: CPT

## 2023-12-13 PROCEDURE — 36415 COLL VENOUS BLD VENIPUNCTURE: CPT

## 2023-12-13 PROCEDURE — 99215 OFFICE O/P EST HI 40 MIN: CPT | Mod: PO | Performed by: INTERNAL MEDICINE

## 2023-12-13 PROCEDURE — 1126F AMNT PAIN NOTED NONE PRSNT: CPT | Performed by: INTERNAL MEDICINE

## 2023-12-13 PROCEDURE — 1159F MED LIST DOCD IN RCRD: CPT | Performed by: INTERNAL MEDICINE

## 2023-12-13 PROCEDURE — 83615 LACTATE (LD) (LDH) ENZYME: CPT | Performed by: INTERNAL MEDICINE

## 2023-12-13 ASSESSMENT — ENCOUNTER SYMPTOMS
VOMITING: 0
BLOOD IN STOOL: 0
DIFFICULTY URINATING: 0
MYALGIAS: 0
SHORTNESS OF BREATH: 0
DIARRHEA: 0
ARTHRALGIAS: 0
DYSURIA: 0
DIZZINESS: 0
PALPITATIONS: 0
OCCASIONAL FEELINGS OF UNSTEADINESS: 0
HEMOPTYSIS: 0
LEG SWELLING: 0
NAUSEA: 0
DEPRESSION: 0
NECK PAIN: 0
FLANK PAIN: 0
BACK PAIN: 0
CONSTIPATION: 0
CHEST TIGHTNESS: 0
EXTREMITY WEAKNESS: 0
HEMATURIA: 0
HOT FLASHES: 0
FREQUENCY: 0
LOSS OF SENSATION IN FEET: 0
VOICE CHANGE: 0
TROUBLE SWALLOWING: 0
FATIGUE: 1
APPETITE CHANGE: 0
CONFUSION: 0
EYE PROBLEMS: 0
BRUISES/BLEEDS EASILY: 0
LIGHT-HEADEDNESS: 0
SEIZURES: 0
SCLERAL ICTERUS: 0
FEVER: 0
ABDOMINAL PAIN: 0
ADENOPATHY: 0
CHILLS: 0
SORE THROAT: 0
NUMBNESS: 0
NERVOUS/ANXIOUS: 0

## 2023-12-13 ASSESSMENT — PATIENT HEALTH QUESTIONNAIRE - PHQ9
1. LITTLE INTEREST OR PLEASURE IN DOING THINGS: NOT AT ALL
SUM OF ALL RESPONSES TO PHQ9 QUESTIONS 1 AND 2: 0
2. FEELING DOWN, DEPRESSED OR HOPELESS: NOT AT ALL

## 2023-12-13 ASSESSMENT — COLUMBIA-SUICIDE SEVERITY RATING SCALE - C-SSRS
2. HAVE YOU ACTUALLY HAD ANY THOUGHTS OF KILLING YOURSELF?: NO
1. IN THE PAST MONTH, HAVE YOU WISHED YOU WERE DEAD OR WISHED YOU COULD GO TO SLEEP AND NOT WAKE UP?: NO
6. HAVE YOU EVER DONE ANYTHING, STARTED TO DO ANYTHING, OR PREPARED TO DO ANYTHING TO END YOUR LIFE?: NO

## 2023-12-13 ASSESSMENT — PAIN SCALES - GENERAL: PAINLEVEL: 0-NO PAIN

## 2023-12-13 NOTE — PROGRESS NOTES
.Patient ID: Gokul Harmon is a 89 y.o. male.  Referring Physician: Magnus Adams MD  [id number] [place] [address]  Primary Care Provider: Leona Ribeiro DO     Chief Complaint   Patient presents with    Follow-up    Melanoma     Follow up on PET scan.     Cancer History:   Treatment Synopsis:   Mr. Gokul Harmon is referred to Medical Oncology team by Dr. Wander Ortiz for comanagement of unresectable melanoma of the left forearm. He met with our team  on 7/19/22.     Mr. Harmon presented to Dr. Ortiz on May 23, 2022 (referred by Dr. Fozia Cortes and Dr. 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      Mr. Harmon had noticed a small boil like lesion on the left forearm somewhere in March 2022 which grew quickly and darkened his skin very quickly till it assumed current proportions. The main lesion was 3cm in dimension when he met with Dr. Ortiz.  Most of his forearm was called to have ecchymosis, but he does have a thick ulcerated melanoma on the left forearm (dorsal aspect) which is actively bleeding. He consented for Ipilimumab and Nivolumab and was started on CHECKMATE 511 protocol.      After 4 cycles, progressive disease was noted on the PET scan on 10/13/2022. MRI brain showed 2 very small focii suspicious for mets (10/19/2022). He met me on 11/2/22. We discussed chemotherapy with CVT regimen versus hospice. He sought one more day  and we spoke on 11/3/2022. He agreed to taking chemotherapy. Consent signed on 11/09/2022. After 2 cycles of chemotherapy, his MRI and PET scan on 12/19/2022 showed tremendous improvement. Continues with chemotherapy.      On 02/22/23, he reported extreme fatigue with last chemotherapy. Hence, we reduced the dose by 33%. Imaging on 03/10/23 showed continued improvement in the melanoma mass. I further truncated the infusions to temodar only due to his continued fatigue.  In 03/20/23, infusions were eliminated to help with fatigue, but he  "came back on 4/5/23 with even worse fatigue.      I held the infusions for now since 4/5/23. RT tot he right forearm mass was given on 4/10/23 to 4/21/23 (36 Gy in 6 fractions). He came to see me on 5/10/23 and his overall performance status was quite poor. We recommended hospice. However, he wanted  to follow up.      I called him on 6/28/23 and he was feeling OK. We ordered restaging scans. PET scan done on 7/20/23 showed slightly progressive disease. MRI brain on 7/27/23 showed one questionable spot in the thalamus. Repeat MRI brain on 7/27/23 showed a questionable  spot in the thalamus, but no other overt disease. We recommend continued surveillance owing to his advanced age and poor PS. We saw the results of the liquid biopsy. It seems that his TMB is quite high (~47 Nut/Mb).      He got MRI brain on 11/10/23 which was unremarkable. But he could not get his PET scan done (he ate in the morning). His PET scan was completed on 12/8/23 which showed complete resolution of metabolic activity in the left forearm mass, but increase in activity and size of the left axillary lesion. I spoke to him about the tumor board discussion, and he is not interested in pursuing surgical resection.     We discussed restarting chemotherapy versus waiting for the opening of the YMAB trial. He is not keen on chemotherapy, so he wants to wait for the YMAB trial to open.      Treatment History:   Systemic Treatment  1. CHECKMATE 511 protocol  C1- 07/25/2022  C2- 08/17/2022  C3- 09/07/2022  C4- 09/28/2022- Progressive disease noted.     2. CVT regimen  C1- 11/14/22 to 11/16/22  C2- 12/05/22 to 12/07/22  C3- 12/27/22 to 12/29/22  C4- 01/16/23 to 01/18/23  C5- 02/06/23 to 02/08/23  C6- 02/27/23 and 02/28/23- 33% dose reduced- Day 3 ommitted  C7- 03/20 to 03/24/23- Only Temodar. Vinblastine and Cisplatin omitted completely.       Subjective   Please refer to \"Notes/Cancer History\" above for complete History of present illness.     Mr " Gokul Harmon is doing well. No new issues. Wants to know scan results. He came with his wife and son-in-law.     Review of Systems:   Review of Systems   Constitutional:  Positive for fatigue. Negative for appetite change, chills and fever.   HENT:   Negative for hearing loss, mouth sores, sore throat, trouble swallowing and voice change.    Eyes:  Negative for eye problems and icterus.   Respiratory:  Negative for chest tightness, hemoptysis and shortness of breath.    Cardiovascular:  Negative for chest pain, leg swelling and palpitations.   Gastrointestinal:  Negative for abdominal pain, blood in stool, constipation, diarrhea, nausea and vomiting.   Endocrine: Negative for hot flashes.   Genitourinary:  Negative for difficulty urinating, dysuria, frequency, hematuria, nocturia and pelvic pain.    Musculoskeletal:  Negative for arthralgias, back pain, flank pain, gait problem, myalgias and neck pain.   Skin:  Negative for itching and rash.   Neurological:  Negative for dizziness, extremity weakness, gait problem, light-headedness, numbness and seizures.   Hematological:  Negative for adenopathy. Does not bruise/bleed easily.   Psychiatric/Behavioral:  Negative for confusion and depression. The patient is not nervous/anxious.          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      chlorpheniramine (Chlor-Trimeton) 4 mg tablet Take 1 tablet (4 mg) by mouth every 6 hours if needed.      furosemide (Lasix) 20 mg tablet Take 1 tablet (20 mg) by mouth once daily.      lidocaine-prilocaine (Emla) 2.5-2.5 % cream Apply topically.  Apply topically as needed to Mediport site 30-45 minutes before being accessed,      ondansetron ODT (Zofran-ODT) 4 mg disintegrating tablet Take 1 tablet (4 mg) by mouth every 8 hours if needed for nausea or vomiting.      predniSONE (Deltasone) 10 mg tablet Take 1 tablet (10 mg) by mouth 2 times a day.      temozolomide (Temodar) 100 mg capsule Take 1 capsule (100 mg total) by mouth.      temozolomide (Temodar) 140 mg capsule Take 1 capsule (140 mg total) by mouth.       No current facility-administered medications on file prior to visit.         Performance Status:  Symptomatic; fully ambulatory     Vitals and Measurements:   /58   Pulse 77   Resp 16   Wt 75.2 kg (165 lb 12.6 oz)   SpO2 95%   BMI 29.30 kg/m²       Physical Exam:   Physical Exam  Constitutional:       General: He is not in acute distress.     Appearance: Normal appearance. He is not ill-appearing.   HENT:      Head: Normocephalic and atraumatic.   Eyes:      Extraocular Movements: Extraocular movements intact.      Conjunctiva/sclera: Conjunctivae normal.      Pupils: Pupils are equal, round, and reactive to light.   Cardiovascular:      Rate and Rhythm: Normal rate and regular rhythm.      Pulses: Normal pulses.   Pulmonary:      Effort: Pulmonary effort is normal.      Breath sounds: No wheezing, rhonchi or rales.   Abdominal:      General: Abdomen is flat. Bowel sounds are normal.      Palpations: There is no mass.      Tenderness: There is no abdominal tenderness. There is no rebound.   Musculoskeletal:         General: Normal range of motion.        Arms:       Cervical back: Neck supple.      Comments: The black spot is the site of original  melanoma. The red area is the area of dermal melanosis.    Lymphadenopathy:      Upper Body:      Left upper body: Axillary adenopathy present.   Skin:     General: Skin is warm.   Neurological:      General: No focal deficit present.      Mental Status: He is alert and oriented to person, place, and time.   Psychiatric:         Mood and Affect: Mood normal.         Behavior: Behavior normal.         Thought Content: Thought content normal.         Judgment: Judgment normal.       Lab Results:  I have reviewed these laboratory results:     Lab Results   Component Value Date    WBC 4.1 (L) 12/13/2023    HGB 12.5 (L) 12/13/2023    HCT 38.6 (L) 12/13/2023     12/13/2023     12/13/2023         Chemistry    Lab Results   Component Value Date/Time     05/10/2023 1220    K 3.9 05/10/2023 1220     (H) 05/10/2023 1220    CO2 26 05/10/2023 1220    BUN 20 05/10/2023 1220    CREATININE 1.16 05/10/2023 1220    Lab Results   Component Value Date/Time    CALCIUM 9.5 05/10/2023 1220    ALKPHOS 80 05/10/2023 1220    AST 14 05/10/2023 1220    ALT 9 (L) 05/10/2023 1220    BILITOT 0.7 05/10/2023 1220            Lab Results   Component Value Date    TSH 1.80 01/16/2023        Radiology Result:  I have reviewed the latest Imaging in PACS and the findings are noted in this note. I discussed the results of the latest imaging with the patient. All previous imaging were reviewed at the time it was completed. Full records are available in the EMR for review as well.     MR brain w and wo IV contrast    Result Date: 11/10/2023  Impression: Possible rim enhancing 6 mm lesion within the right thalamus in region of punctate enhancement seen on previous MRI 07/27/2023. However, this lesion is only visualized on axial postcontrast images. No clear visualization on coronal/sagittal reformats or fat saturated postcontrast images. No associated hemorrhage or edema. Close interval follow-up recommended.   No other lesion  visualized. No acute intracranial abnormality. Mild microangiopathic disease noted.   Signed by: Oralia Johnson 11/10/2023 8:42 AM Dictation workstation:   [id number]    NM PET CT melanoma restaging    Result Date: 12/8/2023  Impression: 1. Interval resolution of the hypermetabolic activity of the left forearm with a residual left forearm hypermetabolic focus, residual disease can not be excluded. 2. Interval increase in hypermetabolic activity and size of a metastatic left axillary lymph node. 3. Interval resolution of a right cervical lymph node hypermetabolic activity. Stable to mildly improved hypermetabolic mediastinal lymph nodes consistent with metastasis. 4. No other new sites of hypermetabolic metastatic disease.   I personally reviewed the image(s) / study and agree with the findings and interpretation as stated. This study was interpreted at [facility].   MACRO: None.   Signed by: Jorge Luis Harris 12/8/2023 2:19 PM Dictation workstation:   [id number]          Pathology Results:  I have reviewed the full pathology report recorded in the EMR. The pertinent portions indicating diagnosis are listed here in the note. for details please refer to the full report recorded in the EMR.    Dermatopathology [May 18 2022 2:05PM] ([id number])     Specimens: SKIN, LEFT SUPERIOR FOREARM /SKIN, LEFT SUPERIOR LATERAL FOREARM /SKIN, LEFT FOREARM INFERIOR /Received in formalin is a tan piece of skin measuring [id number]. The     Name JONAH MELVIN     Accession #: [id number]   Pathologist:  ZEN JULES MD   Date of Procedure: 5/11/2022   Date Received: 5/12/2022   Date Reported 5/18/2022   Submitting Physician: ESTEPHANIA LANDEROS DO   Location: [facility] Other External #       Case is Amended   FINAL DIAGNOSIS    A. SKIN, LEFT SUPERIOR FOREARM, BIOPSY:   MELANOMA IN SITU, LENTIGO MALIGNA TYPE, PRESENT ON THE DEEP AND PERIPHERAL MARGIN, SEE NOTE.     Note: Microscopic examination reveals a  specimen that extends into the mid reticular dermis. There is  dense solar elastosis, and there is an asymmetric proliferation of nested and single melanocytes along the dermal-epidermal junction. It appears that these three biopsies may be from the same lesion, and the synoptic report will reflect the pathology  of the three lesions.     B. SKIN, LEFT SUPERIOR LATERAL FOREARM, BIOPSY:   MALIGNANT MELANOMA, BRESLOW THICKNESS AT LEAST 1.8 MM, PRESENT ON THE DEEP AND PERIPHERAL MARGIN, SEE NOTE.     Note: Microscopic examination reveals a specimen  that extends into the subcutaneous fat. There are heavily pigmented epithelioid cells with dense solar elastosis. There also appears to be an area of vascular invasion. These   cells stain strongly with antibodies against SOX10. All control slides  stain appropriately.     The lack of epidermal attachment raises the possibility of metastatic or recurrent melanoma. This case has been amended to change lymphovascular invasion from not identified to present in the synoptic report.      C. SKIN, LEFT FOREARM INFERIOR:   MALIGNANT MELANOMA, BRESLOW THICKNESS 4.3 MM, PRESENT ON THE PERIPHERAL MARGIN,   SEE NOTE.     Note: Microscopic examination reveals a specimen that extends into the subcutaneous fat. There is a proliferation  of nested and single atypical melanocytes throughout all layers of the epidermis. There is moderate solar elastosis, and there are heavily pigmented epithelioid and spindled cells in the dermis. The melanocytes stain with antibodies against SOX10. All  control slides stain appropriately.     Electronically Signed Out by LIZZ. АНДРЕЙ JULES     DISEASE RELEVANT ALTERATIONS NOT DETECTED:   Negative for BRAF V600.     GENOMIC ALTERATIONS FOUND ON GUARDANT 360  NF1 R416  TP53  TMB- 47.5  MSI-High: Not detected.     Assessment and Plan:   Assessment/Plan      Mr. Gokul Harmon is a 89 y.o. 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 Please see  the evolution of the case listed above in the cancer history.     Today,   Mr. Harmon came in with his wife and his son-in-law. He understands the situation and knows that the tumor in his axilla is growing. He is not interested in a surgical option. He has a pretty independent lifestyle and wants to maintain that. He does palpate the lymph node in his axilla and now we know that this is melanoma.     I offered to restart chemotherapy versus wait for the YMAB trial to open (this is a radio-pharmaceutical trial). He wants to wait for the clinical trial. I will have a phone call with him in 2 weeks as this trial is expected to open anytime this year.      DISCLAIMER:   In preparing for this visit and writing this note, I reviewed all the previous electronic medical records (labs, imaging and medical charts) of the patient available in the physician portal. Significant findings which helped in decision making are recorded  in this chart.     The plan was discussed with the patient. We gave him ample opportunities to ask questions. All questions were answered to his satisfaction and he verbalized understanding.       Magnus Adams MD    Hematology and Oncology     Phone: [phone]  Fax: [phone].

## 2023-12-15 ENCOUNTER — HOSPITAL ENCOUNTER (OUTPATIENT)
Dept: CARDIOLOGY | Facility: HOSPITAL | Age: 88
Discharge: HOME | End: 2023-12-15
Payer: MEDICARE

## 2023-12-15 VITALS
DIASTOLIC BLOOD PRESSURE: 60 MMHG | RESPIRATION RATE: 12 BRPM | SYSTOLIC BLOOD PRESSURE: 121 MMHG | HEIGHT: 63 IN | HEART RATE: 58 BPM | BODY MASS INDEX: 29.23 KG/M2 | TEMPERATURE: 98.5 F | OXYGEN SATURATION: 96 % | WEIGHT: 165 LBS

## 2023-12-15 DIAGNOSIS — C43.62 MALIGNANT MELANOMA OF ARM, LEFT (MULTI): ICD-10-CM

## 2023-12-15 DIAGNOSIS — R59.0 LYMPHADENOPATHY, HILAR: ICD-10-CM

## 2023-12-15 PROCEDURE — 99152 MOD SED SAME PHYS/QHP 5/>YRS: CPT | Performed by: RADIOLOGY

## 2023-12-15 PROCEDURE — 36590 REMOVAL TUNNELED CV CATH: CPT | Performed by: RADIOLOGY

## 2023-12-15 PROCEDURE — 2500000004 HC RX 250 GENERAL PHARMACY W/ HCPCS (ALT 636 FOR OP/ED): Performed by: RADIOLOGY

## 2023-12-15 RX ORDER — MIDAZOLAM HYDROCHLORIDE 1 MG/ML
INJECTION, SOLUTION INTRAMUSCULAR; INTRAVENOUS
Status: COMPLETED | OUTPATIENT
Start: 2023-12-15 | End: 2023-12-15

## 2023-12-15 RX ORDER — FENTANYL CITRATE 50 UG/ML
INJECTION, SOLUTION INTRAMUSCULAR; INTRAVENOUS
Status: COMPLETED | OUTPATIENT
Start: 2023-12-15 | End: 2023-12-15

## 2023-12-15 RX ADMIN — MIDAZOLAM 1 MG: 1 INJECTION INTRAMUSCULAR; INTRAVENOUS at 10:09

## 2023-12-15 RX ADMIN — FENTANYL CITRATE 50 MCG: 50 INJECTION, SOLUTION INTRAMUSCULAR; INTRAVENOUS at 10:10

## 2023-12-15 ASSESSMENT — PAIN - FUNCTIONAL ASSESSMENT
PAIN_FUNCTIONAL_ASSESSMENT: 0-10

## 2023-12-15 ASSESSMENT — PAIN SCALES - GENERAL
PAINLEVEL_OUTOF10: 0 - NO PAIN

## 2023-12-26 ENCOUNTER — OFFICE VISIT (OUTPATIENT)
Dept: FAMILY MEDICINE CLINIC | Age: 88
End: 2023-12-26
Payer: MEDICARE

## 2023-12-26 VITALS
RESPIRATION RATE: 16 BRPM | SYSTOLIC BLOOD PRESSURE: 136 MMHG | WEIGHT: 168 LBS | HEIGHT: 67 IN | BODY MASS INDEX: 26.37 KG/M2 | OXYGEN SATURATION: 98 % | DIASTOLIC BLOOD PRESSURE: 74 MMHG | HEART RATE: 85 BPM

## 2023-12-26 DIAGNOSIS — H61.23 BILATERAL IMPACTED CERUMEN: Primary | ICD-10-CM

## 2023-12-26 DIAGNOSIS — H91.93 BILATERAL HEARING LOSS, UNSPECIFIED HEARING LOSS TYPE: ICD-10-CM

## 2023-12-26 PROCEDURE — 1123F ACP DISCUSS/DSCN MKR DOCD: CPT | Performed by: FAMILY MEDICINE

## 2023-12-26 PROCEDURE — G8484 FLU IMMUNIZE NO ADMIN: HCPCS | Performed by: FAMILY MEDICINE

## 2023-12-26 PROCEDURE — 1036F TOBACCO NON-USER: CPT | Performed by: FAMILY MEDICINE

## 2023-12-26 PROCEDURE — 99213 OFFICE O/P EST LOW 20 MIN: CPT | Performed by: FAMILY MEDICINE

## 2023-12-26 PROCEDURE — G8427 DOCREV CUR MEDS BY ELIG CLIN: HCPCS | Performed by: FAMILY MEDICINE

## 2023-12-26 PROCEDURE — 69209 REMOVE IMPACTED EAR WAX UNI: CPT | Performed by: FAMILY MEDICINE

## 2023-12-26 PROCEDURE — G8419 CALC BMI OUT NRM PARAM NOF/U: HCPCS | Performed by: FAMILY MEDICINE

## 2023-12-26 NOTE — PROGRESS NOTES
Chief Complaint:     Chief Complaint   Patient presents with    Ear Fullness         Ear Fullness   There is pain in both ears. This is a recurrent problem. The current episode started 1 to 4 weeks ago. The problem has been unchanged. There has been no fever. Associated symptoms include hearing loss. Pertinent negatives include no abdominal pain, coughing, diarrhea, headaches, neck pain, rash, rhinorrhea, sore throat or vomiting. He has tried nothing for the symptoms. The treatment provided no relief. Patient Active Problem List   Diagnosis    Syncope and collapse    AV block, Mobitz II    Other hyperlipidemia    Head injury       Past Medical History:   Diagnosis Date    Cancer ([address]) 1996    PROSTATE    Hyperlipidemia     Urethral stricture        Past Surgical History:   Procedure Laterality Date    CYSTOSCOPY  1996    SEED IMPLANTATION    CYSTOSCOPY  07/10/2012    retrograde pyelograms urethral dilatation       Current Outpatient Medications   Medication Sig Dispense Refill    atorvastatin (LIPITOR) 10 MG tablet Take 1 tablet by mouth every other day NIGHTLY 90 tablet 1     No current facility-administered medications for this visit. No Known Allergies    Social History     Socioeconomic History    Marital status:      Spouse name: None    Number of children: None    Years of education: None    Highest education level: None   Tobacco Use    Smoking status: Never    Smokeless tobacco: Never   Vaping Use    Vaping Use: Never used   Substance and Sexual Activity    Alcohol use: No    Drug use: No     Social Determinants of Health     Physical Activity: Inactive (10/14/2022)    Exercise Vital Sign     Days of Exercise per Week: 0 days     Minutes of Exercise per Session: 0 min       History reviewed. No pertinent family history. Review of Systems   Constitutional:  Negative for activity change, appetite change, fatigue and fever. HENT:  Positive for hearing loss.  Negative for congestion,

## 2023-12-27 ENCOUNTER — APPOINTMENT (OUTPATIENT)
Dept: HEMATOLOGY/ONCOLOGY | Facility: CLINIC | Age: 88
End: 2023-12-27
Payer: MEDICARE

## 2024-01-10 ENCOUNTER — OFFICE VISIT (OUTPATIENT)
Dept: HEMATOLOGY/ONCOLOGY | Facility: CLINIC | Age: 89
End: 2024-01-10
Payer: MEDICARE

## 2024-01-10 DIAGNOSIS — C43.62 MALIGNANT MELANOMA OF ARM, LEFT (MULTI): ICD-10-CM

## 2024-01-10 DIAGNOSIS — R59.0 LYMPHADENOPATHY, HILAR: ICD-10-CM

## 2024-01-17 ENCOUNTER — OFFICE VISIT (OUTPATIENT)
Dept: HEMATOLOGY/ONCOLOGY | Facility: CLINIC | Age: 89
End: 2024-01-17
Payer: MEDICARE

## 2024-01-17 DIAGNOSIS — C43.62 MALIGNANT MELANOMA OF ARM, LEFT (MULTI): Primary | ICD-10-CM

## 2024-01-17 DIAGNOSIS — R59.0 LYMPHADENOPATHY, HILAR: ICD-10-CM

## 2024-01-17 PROCEDURE — 99443 PR PHYS/QHP TELEPHONE EVALUATION 21-30 MIN: CPT | Performed by: INTERNAL MEDICINE

## 2024-01-17 PROCEDURE — 1036F TOBACCO NON-USER: CPT | Performed by: INTERNAL MEDICINE

## 2024-01-17 PROCEDURE — 1126F AMNT PAIN NOTED NONE PRSNT: CPT | Performed by: INTERNAL MEDICINE

## 2024-01-17 ASSESSMENT — ENCOUNTER SYMPTOMS
CONFUSION: 0
HEMOPTYSIS: 0
FREQUENCY: 0
BRUISES/BLEEDS EASILY: 0
EXTREMITY WEAKNESS: 0
BLOOD IN STOOL: 0
FLANK PAIN: 0
LIGHT-HEADEDNESS: 0
SORE THROAT: 0
DIFFICULTY URINATING: 0
SCLERAL ICTERUS: 0
DIZZINESS: 0
SHORTNESS OF BREATH: 0
NECK PAIN: 0
NERVOUS/ANXIOUS: 0
CHILLS: 0
ABDOMINAL PAIN: 0
CONSTIPATION: 0
HOT FLASHES: 0
HEMATURIA: 0
CHEST TIGHTNESS: 0
DIARRHEA: 0
ARTHRALGIAS: 0
PALPITATIONS: 0
LEG SWELLING: 0
DYSURIA: 0
APPETITE CHANGE: 0
BACK PAIN: 0
FATIGUE: 1
ADENOPATHY: 0
NAUSEA: 0
VOMITING: 0
FEVER: 0
NUMBNESS: 0
VOICE CHANGE: 0
MYALGIAS: 0
EYE PROBLEMS: 0
TROUBLE SWALLOWING: 0
DEPRESSION: 0
SEIZURES: 0

## 2024-01-17 NOTE — PROGRESS NOTES
.Patient ID: Gokul Harmon is a 89 y.o. male.  Referring Physician: No referring provider defined for this encounter.  Primary Care Provider: Leona Ribeiro DO     Chief Complaint   Patient presents with    Follow-up    Melanoma     Phone visit.   General follow up     Cancer History:   Treatment Synopsis:   Mr. Gokul Harmon is referred to Medical Oncology team by Dr. Wander Ortiz for comanagement of unresectable melanoma of the left forearm. He met with our team  on 7/19/22.     Mr. Harmon presented to Dr. Ortiz on May 23, 2022 (referred by Dr. Fozia Cortes and Dr. 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      Mr. Harmon had noticed a small boil like lesion on the left forearm somewhere in March 2022 which grew quickly and darkened his skin very quickly till it assumed current proportions. The main lesion was 3cm in dimension when he met with Dr. Ortiz.  Most of his forearm was called to have ecchymosis, but he does have a thick ulcerated melanoma on the left forearm (dorsal aspect) which is actively bleeding. He consented for Ipilimumab and Nivolumab and was started on CHECKMATE 511 protocol.      After 4 cycles, progressive disease was noted on the PET scan on 10/13/2022. MRI brain showed 2 very small focii suspicious for mets (10/19/2022). He met me on 11/2/22. We discussed chemotherapy with CVT regimen versus hospice. He sought one more day  and we spoke on 11/3/2022. He agreed to taking chemotherapy. Consent signed on 11/09/2022. After 2 cycles of chemotherapy, his MRI and PET scan on 12/19/2022 showed tremendous improvement. Continues with chemotherapy.      On 02/22/23, he reported extreme fatigue with last chemotherapy. Hence, we reduced the dose by 33%. Imaging on 03/10/23 showed continued improvement in the melanoma mass. I further truncated the infusions to temodar only due to his continued fatigue.  In 03/20/23, infusions were eliminated to help with fatigue, but  he came back on 4/5/23 with even worse fatigue.      I held the infusions for now since 4/5/23. RT tot he right forearm mass was given on 4/10/23 to 4/21/23 (36 Gy in 6 fractions). He came to see me on 5/10/23 and his overall performance status was quite poor. We recommended hospice. However, he wanted  to follow up.      I called him on 6/28/23 and he was feeling OK. We ordered restaging scans. PET scan done on 7/20/23 showed slightly progressive disease. MRI brain on 7/27/23 showed one questionable spot in the thalamus. Repeat MRI brain on 7/27/23 showed a questionable  spot in the thalamus, but no other overt disease. We recommend continued surveillance owing to his advanced age and poor PS. We saw the results of the liquid biopsy. It seems that his TMB is quite high (~47 Nut/Mb).      He got MRI brain on 11/10/23 which was unremarkable. But he could not get his PET scan done (he ate in the morning). His PET scan was completed on 12/8/23 which showed complete resolution of metabolic activity in the left forearm mass, but increase in activity and size of the left axillary lesion. I spoke to him about the tumor board discussion, and he is not interested in pursuing surgical resection.     We discussed restarting chemotherapy versus waiting for the opening of the YM[employer] trial. He is not keen on chemotherapy, so he wants to wait for the YMAB trial to open. We talked again on 1/17/24 and told him that YMAB trial is open, however, it may be a while before we can enroll him. He wanted to start temodar again, however, after understanding that this is chemotherapy and may cause fatigue, he wanted to wait another 4 weeks.      Treatment History:   Systemic Treatment  1. CHECKMATE 511 protocol  C1- 07/25/2022  C2- 08/17/2022  C3- 09/07/2022  C4- 09/28/2022- Progressive disease noted.     2. CVT regimen  C1- 11/14/22 to 11/16/22  C2- 12/05/22 to 12/07/22  C3- 12/27/22 to 12/29/22  C4- 01/16/23 to 01/18/23  C5- 02/06/23 to  "02/08/23  C6- 02/27/23 and 02/28/23- 33% dose reduced- Day 3 ommitted  C7- 03/20 to 03/24/23- Only Temodar. Vinblastine and Cisplatin omitted completely.       Subjective   Please refer to \"Notes/Cancer History\" above for complete History of present illness.     Mr Gokul Harmon is doing well. No new issues. He wants to know the future of therapy.     The visit was changed to phone visit/virtual visit in agreement with the patient. I am always available to meet with the patient in person should they wish to meet me.     Review of Systems:   Review of Systems   Constitutional:  Positive for fatigue. Negative for appetite change, chills and fever.   HENT:   Negative for hearing loss, mouth sores, sore throat, trouble swallowing and voice change.    Eyes:  Negative for eye problems and icterus.   Respiratory:  Negative for chest tightness, hemoptysis and shortness of breath.    Cardiovascular:  Negative for chest pain, leg swelling and palpitations.   Gastrointestinal:  Negative for abdominal pain, blood in stool, constipation, diarrhea, nausea and vomiting.   Endocrine: Negative for hot flashes.   Genitourinary:  Negative for difficulty urinating, dysuria, frequency, hematuria, nocturia and pelvic pain.    Musculoskeletal:  Negative for arthralgias, back pain, flank pain, gait problem, myalgias and neck pain.   Skin:  Negative for itching and rash.   Neurological:  Negative for dizziness, extremity weakness, gait problem, light-headedness, numbness and seizures.   Hematological:  Negative for adenopathy. Does not bruise/bleed easily.   Psychiatric/Behavioral:  Negative for confusion and depression. The patient is not nervous/anxious.          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      chlorpheniramine (Chlor-Trimeton) 4 mg tablet Take 1 tablet (4 mg) by mouth every 6 hours if needed.      furosemide (Lasix) 20 mg tablet Take 1 tablet (20 mg) by mouth once daily.      lidocaine-prilocaine (Emla) 2.5-2.5 % cream Apply topically.  Apply topically as needed to Mediport site 30-45 minutes before being accessed,      ondansetron ODT (Zofran-ODT) 4 mg disintegrating tablet Take 1 tablet (4 mg) by mouth every 8 hours if needed for nausea or vomiting.      predniSONE (Deltasone) 10 mg tablet Take 1 tablet (10 mg) by mouth 2 times a day.      temozolomide (Temodar) 100 mg capsule Take 1 capsule (100 mg total) by mouth.      temozolomide (Temodar) 140 mg capsule Take 1 capsule (140 mg total) by mouth.       No current facility-administered medications on file prior to visit.         Performance Status:  Symptomatic; fully ambulatory     Vitals and Measurements:   There were no vitals taken for this visit.      Physical Exam:   Physical Exam  Lab Results:  I have reviewed these laboratory results:     No visits with results within 1 Month(s) from this visit.   Latest known visit with results is:   Lab on 12/13/2023   Component Date Value Ref Range Status    WBC 12/13/2023 4.1 (L)  4.4 - 11.3 x10*3/uL Final    nRBC 12/13/2023    Final    RBC 12/13/2023 3.85 (L)  4.50 - 5.90 x10*6/uL Final    Hemoglobin 12/13/2023 12.5 (L)  13.5 - 17.5 g/dL Final    Hematocrit 12/13/2023 38.6 (L)  41.0 - 52.0 % Final    MCV 12/13/2023 100  80 - 100 fL Final    MCH 12/13/2023  32.5  26.0 - 34.0 pg Final    MCHC 12/13/2023 32.4  32.0 - 36.0 g/dL Final    RDW 12/13/2023 13.9  11.5 - 14.5 % Final    Platelets 12/13/2023 197  150 - 450 x10*3/uL Final    Neutrophils % 12/13/2023 62.1  40.0 - 80.0 % Final    Immature Granulocytes %, Automated 12/13/2023 0.0  0.0 - 0.9 % Final    Lymphocytes % 12/13/2023 22.4  13.0 - 44.0 % Final    Monocytes % 12/13/2023 10.1  2.0 - 10.0 % Final    Eosinophils % 12/13/2023 4.9  0.0 - 6.0 % Final    Basophils % 12/13/2023 0.5  0.0 - 2.0 % Final    Neutrophils Absolute 12/13/2023 2.52  1.60 - 5.50 x10*3/uL Final    Immature Granulocytes Absolute, Au* 12/13/2023 0.00  0.00 - 0.50 x10*3/uL Final    Lymphocytes Absolute 12/13/2023 0.91  0.80 - 3.00 x10*3/uL Final    Monocytes Absolute 12/13/2023 0.41  0.05 - 0.80 x10*3/uL Final    Eosinophils Absolute 12/13/2023 0.20  0.00 - 0.40 x10*3/uL Final    Basophils Absolute 12/13/2023 0.02  0.00 - 0.10 x10*3/uL Final    Glucose 12/13/2023 65 (L)  74 - 99 mg/dL Final    Sodium 12/13/2023 142  136 - 145 mmol/L Final    Potassium 12/13/2023 4.0  3.5 - 5.3 mmol/L Final    Chloride 12/13/2023 106  98 - 107 mmol/L Final    Bicarbonate 12/13/2023 28  21 - 32 mmol/L Final    Anion Gap 12/13/2023 12  10 - 20 mmol/L Final    Urea Nitrogen 12/13/2023 16  6 - 23 mg/dL Final    Creatinine 12/13/2023 1.03  0.50 - 1.30 mg/dL Final    eGFR 12/13/2023 69  >60 mL/min/1.73m*2 Final    Calcium 12/13/2023 9.6  8.6 - 10.6 mg/dL Final    Albumin 12/13/2023 4.0  3.4 - 5.0 g/dL Final    Alkaline Phosphatase 12/13/2023 83  33 - 136 U/L Final    Total Protein 12/13/2023 7.0  6.4 - 8.2 g/dL Final    AST 12/13/2023 16  9 - 39 U/L Final    Bilirubin, Total 12/13/2023 0.7  0.0 - 1.2 mg/dL Final    ALT 12/13/2023 12  10 - 52 U/L Final    LDH 12/13/2023 129  84 - 246 U/L Final    Protime 12/13/2023 10.7  9.8 - 12.8 seconds Final    INR 12/13/2023 1.0  0.9 - 1.1 Final     Radiology Result:  I have reviewed the latest Imaging in PACS and the findings are  noted in this note. I discussed the results of the latest imaging with the patient. All previous imaging were reviewed at the time it was completed. Full records are available in the EMR for review as well.     MR brain w and wo IV contrast    Result Date: 11/10/2023  Impression: Possible rim enhancing 6 mm lesion within the right thalamus in region of punctate enhancement seen on previous MRI 07/27/2023. However, this lesion is only visualized on axial postcontrast images. No clear visualization on coronal/sagittal reformats or fat saturated postcontrast images. No associated hemorrhage or edema. Close interval follow-up recommended.   No other lesion visualized. No acute intracranial abnormality. Mild microangiopathic disease noted.   Signed by: Oralia Johnson 11/10/2023 8:42 AM Dictation workstation:   [id number]    NM PET CT melanoma restaging    Result Date: 12/8/2023  Impression: 1. Interval resolution of the hypermetabolic activity of the left forearm with a residual left forearm hypermetabolic focus, residual disease can not be excluded. 2. Interval increase in hypermetabolic activity and size of a metastatic left axillary lymph node. 3. Interval resolution of a right cervical lymph node hypermetabolic activity. Stable to mildly improved hypermetabolic mediastinal lymph nodes consistent with metastasis. 4. No other new sites of hypermetabolic metastatic disease.   I personally reviewed the image(s) / study and agree with the findings and interpretation as stated. This study was interpreted at [facility].   MACRO: None.   Signed by: Jorge Luis Harris 12/8/2023 2:19 PM Dictation workstation:   [id number]          Pathology Results:  I have reviewed the full pathology report recorded in the EMR. The pertinent portions indicating diagnosis are listed here in the note. for details please refer to the full report recorded in the EMR.    Dermatopathology [May 18 2022 2:05PM]  ([id number])     Specimens: SKIN, LEFT SUPERIOR FOREARM /SKIN, LEFT SUPERIOR LATERAL FOREARM /SKIN, LEFT FOREARM INFERIOR /Received in formalin is a tan piece of skin measuring [id number]. The     Name JONAH MELVIN     Accession #: [id number]   Pathologist:  ZEN JULES MD   Date of Procedure: 5/11/2022   Date Received: 5/12/2022   Date Reported 5/18/2022   Submitting Physician: ESTEPHANIA LANDEROS DO   Location: [facility] Other External #       Case is Amended   FINAL DIAGNOSIS    A. SKIN, LEFT SUPERIOR FOREARM, BIOPSY:   MELANOMA IN SITU, LENTIGO MALIGNA TYPE, PRESENT ON THE DEEP AND PERIPHERAL MARGIN, SEE NOTE.     Note: Microscopic examination reveals a specimen that extends into the mid reticular dermis. There is  dense solar elastosis, and there is an asymmetric proliferation of nested and single melanocytes along the dermal-epidermal junction. It appears that these three biopsies may be from the same lesion, and the synoptic report will reflect the pathology  of the three lesions.     B. SKIN, LEFT SUPERIOR LATERAL FOREARM, BIOPSY:   MALIGNANT MELANOMA, BRESLOW THICKNESS AT LEAST 1.8 MM, PRESENT ON THE DEEP AND PERIPHERAL MARGIN, SEE NOTE.     Note: Microscopic examination reveals a specimen  that extends into the subcutaneous fat. There are heavily pigmented epithelioid cells with dense solar elastosis. There also appears to be an area of vascular invasion. These   cells stain strongly with antibodies against SOX10. All control slides  stain appropriately.     The lack of epidermal attachment raises the possibility of metastatic or recurrent melanoma. This case has been amended to change lymphovascular invasion from not identified to present in the synoptic report.      C. SKIN, LEFT FOREARM INFERIOR:   MALIGNANT MELANOMA, BRESLOW THICKNESS 4.3 MM, PRESENT ON THE PERIPHERAL MARGIN,   SEE NOTE.     Note: Microscopic examination reveals a specimen that extends into the subcutaneous fat. There is a  proliferation  of nested and single atypical melanocytes throughout all layers of the epidermis. There is moderate solar elastosis, and there are heavily pigmented epithelioid and spindled cells in the dermis. The melanocytes stain with antibodies against SOX10. All  control slides stain appropriately.     Electronically Signed Out by BELTRAN JULES M.D.     DISEASE RELEVANT ALTERATIONS NOT DETECTED:   Negative for BRAF V600.     GENOMIC ALTERATIONS FOUND ON GUARDANT 360  NF1 R416  TP53  TMB- 47.5  MSI-High: Not detected.     Assessment and Plan:   Assessment/Plan      Mr. Gokul Harmon is a 89 y.o. 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 Please see the evolution of the case listed above in the cancer history.     Today,   We spoke about the YM[employer] trial and told him that the trial is open, however, it will be a while before we get a slot. Mr. Harmon wanted to started chemotherapy with temodar, however, he did not understand this completely. When I told him one of the adverse events of temodar is fatigue, he wanted to wait for 4 weeks. I will call him back in 4 weeks.      DISCLAIMER:   In preparing for this visit and writing this note, I reviewed all the previous electronic medical records (labs, imaging and medical charts) of the patient available in the physician portal. Significant findings which helped in decision making are recorded  in this chart.     The plan was discussed with the patient. We gave him ample opportunities to ask questions. All questions were answered to his satisfaction and he verbalized understanding.       Magnus Adams MD    Hematology and Oncology     Phone: [phone]  Fax: [phone].

## 2024-02-14 ENCOUNTER — TELEMEDICINE (OUTPATIENT)
Dept: HEMATOLOGY/ONCOLOGY | Facility: CLINIC | Age: 89
End: 2024-02-14
Payer: MEDICARE

## 2024-02-14 DIAGNOSIS — R59.0 LYMPHADENOPATHY, HILAR: ICD-10-CM

## 2024-02-14 DIAGNOSIS — C43.62 MALIGNANT MELANOMA OF ARM, LEFT (MULTI): Primary | ICD-10-CM

## 2024-02-14 PROCEDURE — 99442 PR PHYS/QHP TELEPHONE EVALUATION 11-20 MIN: CPT | Performed by: INTERNAL MEDICINE

## 2024-02-14 PROCEDURE — 1036F TOBACCO NON-USER: CPT | Performed by: INTERNAL MEDICINE

## 2024-02-14 PROCEDURE — 1126F AMNT PAIN NOTED NONE PRSNT: CPT | Performed by: INTERNAL MEDICINE

## 2024-02-14 ASSESSMENT — ENCOUNTER SYMPTOMS
NERVOUS/ANXIOUS: 0
CHILLS: 0
FATIGUE: 1
SORE THROAT: 0
FLANK PAIN: 0
SHORTNESS OF BREATH: 0
HEMATURIA: 0
DIZZINESS: 0
FEVER: 0
FREQUENCY: 0
TROUBLE SWALLOWING: 0
VOICE CHANGE: 0
HEMOPTYSIS: 0
ARTHRALGIAS: 0
ABDOMINAL PAIN: 0
ADENOPATHY: 0
EYE PROBLEMS: 0
DEPRESSION: 0
VOMITING: 0
LEG SWELLING: 0
BLOOD IN STOOL: 0
DIARRHEA: 0
APPETITE CHANGE: 0
HOT FLASHES: 0
CONFUSION: 0
LIGHT-HEADEDNESS: 0
NAUSEA: 0
SCLERAL ICTERUS: 0
PALPITATIONS: 0
CONSTIPATION: 0
SEIZURES: 0
NUMBNESS: 0
EXTREMITY WEAKNESS: 0
BRUISES/BLEEDS EASILY: 0
BACK PAIN: 0
MYALGIAS: 0
NECK PAIN: 0
CHEST TIGHTNESS: 0
DYSURIA: 0
DIFFICULTY URINATING: 0

## 2024-02-14 NOTE — PROGRESS NOTES
.Patient ID: Gokul Harmon is a 89 y.o. male.  Referring Physician: Magnus Adams [place]  [id number] [place] [address]  Primary Care Provider: Leona Ribeiro DO     Chief Complaint   Patient presents with    Follow-up    Melanoma     Phone visit.      Cancer History:   Treatment Synopsis:   Mr. Gokul Harmon is referred to Medical Oncology team by Dr. Wander Ortiz for comanagement of unresectable melanoma of the left forearm. He met with our team  on 7/19/22.     Mr. Harmon presented to Dr. Ortiz on May 23, 2022 (referred by Dr. Fozia Cortes and Dr. 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      Mr. Harmon had noticed a small boil like lesion on the left forearm somewhere in March 2022 which grew quickly and darkened his skin very quickly till it assumed current proportions. The main lesion was 3cm in dimension when he met with Dr. Ortiz.  Most of his forearm was called to have ecchymosis, but he does have a thick ulcerated melanoma on the left forearm (dorsal aspect) which is actively bleeding. He consented for Ipilimumab and Nivolumab and was started on CHECKMATE 511 protocol.      After 4 cycles, progressive disease was noted on the PET scan on 10/13/2022. MRI brain showed 2 very small focii suspicious for mets (10/19/2022). He met me on 11/2/22. We discussed chemotherapy with CVT regimen versus hospice. He sought one more day  and we spoke on 11/3/2022. He agreed to taking chemotherapy. Consent signed on 11/09/2022. After 2 cycles of chemotherapy, his MRI and PET scan on 12/19/2022 showed tremendous improvement. Continues with chemotherapy.      On 02/22/23, he reported extreme fatigue with last chemotherapy. Hence, we reduced the dose by 33%. Imaging on 03/10/23 showed continued improvement in the melanoma mass. I further truncated the infusions to temodar only due to his continued fatigue.  In 03/20/23, infusions were eliminated to help with fatigue, but he came back  on 4/5/23 with even worse fatigue.      I held the infusions for now since 4/5/23. RT tot he right forearm mass was given on 4/10/23 to 4/21/23 (36 Gy in 6 fractions). He came to see me on 5/10/23 and his overall performance status was quite poor. We recommended hospice. However, he wanted  to follow up.      I called him on 6/28/23 and he was feeling OK. We ordered restaging scans. PET scan done on 7/20/23 showed slightly progressive disease. MRI brain on 7/27/23 showed one questionable spot in the thalamus. Repeat MRI brain on 7/27/23 showed a questionable  spot in the thalamus, but no other overt disease. We recommend continued surveillance owing to his advanced age and poor PS. We saw the results of the liquid biopsy. It seems that his TMB is quite high (~47 Nut/Mb).      He got MRI brain on 11/10/23 which was unremarkable. But he could not get his PET scan done (he ate in the morning). His PET scan was completed on 12/8/23 which showed complete resolution of metabolic activity in the left forearm mass, but increase in activity and size of the left axillary lesion. I spoke to him about the tumor board discussion, and he is not interested in pursuing surgical resection.     We discussed restarting chemotherapy versus waiting for the opening of the [facility] trial. He is not keen on chemotherapy, so he wants to wait for the [facility] trial to open. We talked again on 1/17/24 and told him that [facility] trial is open, however, it may be a while before we can enroll him. He wanted to start temodar again, however, after understanding that this is chemotherapy and may cause fatigue, he wanted to wait another 4 weeks.     I called him back on 2/14/23 and Mr. Harmon told me that he does not want to pursue any treatment at this point. He also reiterated that he has experience with hospice from his late wife and daughter and understands that his treatment is palliative. He feels that the entire process of treatment is too much and he  "will call us back if he needs us.      Treatment History:   Systemic Treatment  1. CHECKMATE 511 protocol  C1- 07/25/2022  C2- 08/17/2022  C3- 09/07/2022  C4- 09/28/2022- Progressive disease noted.     2. CVT regimen  C1- 11/14/22 to 11/16/22  C2- 12/05/22 to 12/07/22  C3- 12/27/22 to 12/29/22  C4- 01/16/23 to 01/18/23  C5- 02/06/23 to 02/08/23  C6- 02/27/23 and 02/28/23- 33% dose reduced- Day 3 ommitted  C7- 03/20 to 03/24/23- Only Temodar. Vinblastine and Cisplatin omitted completely.       Subjective   Please refer to \"Notes/Cancer History\" above for complete History of present illness.     Mr Gokul Harmon is doing well. No new issues. He does not want to pursue any treatment considering all the commitment that will be needed. He has experience with hospice and he understands that this is palliative regimen. He wants to call us if he wants to get treatment.     The visit was changed to phone visit/virtual visit in agreement with the patient. I am always available to meet with the patient in person should they wish to meet me.     Review of Systems:   Review of Systems   Constitutional:  Positive for fatigue. Negative for appetite change, chills and fever.   HENT:   Negative for hearing loss, mouth sores, sore throat, trouble swallowing and voice change.    Eyes:  Negative for eye problems and icterus.   Respiratory:  Negative for chest tightness, hemoptysis and shortness of breath.    Cardiovascular:  Negative for chest pain, leg swelling and palpitations.   Gastrointestinal:  Negative for abdominal pain, blood in stool, constipation, diarrhea, nausea and vomiting.   Endocrine: Negative for hot flashes.   Genitourinary:  Negative for difficulty urinating, dysuria, frequency, hematuria, nocturia and pelvic pain.    Musculoskeletal:  Negative for arthralgias, back pain, flank pain, gait problem, myalgias and neck pain.   Skin:  Negative for itching and rash.   Neurological:  Negative for dizziness, extremity " weakness, gait problem, light-headedness, numbness and seizures.   Hematological:  Negative for adenopathy. Does not bruise/bleed easily.   Psychiatric/Behavioral:  Negative for confusion and depression. The patient is not nervous/anxious.          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      chlorpheniramine (Chlor-Trimeton) 4 mg tablet Take 1 tablet (4 mg) by mouth every 6 hours if needed.      furosemide (Lasix) 20 mg tablet Take 1 tablet (20 mg) by mouth once daily.      lidocaine-prilocaine (Emla) 2.5-2.5 % cream Apply topically.  Apply topically as needed to Mediport site 30-45 minutes before being accessed,      ondansetron ODT (Zofran-ODT) 4 mg disintegrating tablet Take 1 tablet (4 mg) by mouth every 8 hours if needed for nausea or vomiting.      predniSONE (Deltasone) 10 mg tablet Take 1 tablet (10 mg) by mouth 2 times a day.      temozolomide (Temodar) 100 mg capsule Take 1 capsule (100 mg total) by mouth.      temozolomide (Temodar) 140 mg capsule Take 1 capsule (140 mg total) by mouth.       No current facility-administered medications on file prior to visit.         Performance Status:  Symptomatic; fully ambulatory     Vitals and Measurements:   There were no vitals taken for this visit.      Physical Exam:   Physical  Exam  Lab Results:  I have reviewed these laboratory results:     No visits with results within 1 Month(s) from this visit.   Latest known visit with results is:   Lab on 12/13/2023   Component Date Value Ref Range Status    WBC 12/13/2023 4.1 (L)  4.4 - 11.3 x10*3/uL Final    nRBC 12/13/2023    Final    RBC 12/13/2023 3.85 (L)  4.50 - 5.90 x10*6/uL Final    Hemoglobin 12/13/2023 12.5 (L)  13.5 - 17.5 g/dL Final    Hematocrit 12/13/2023 38.6 (L)  41.0 - 52.0 % Final    MCV 12/13/2023 100  80 - 100 fL Final    MCH 12/13/2023 32.5  26.0 - 34.0 pg Final    MCHC 12/13/2023 32.4  32.0 - 36.0 g/dL Final    RDW 12/13/2023 13.9  11.5 - 14.5 % Final    Platelets 12/13/2023 197  150 - 450 x10*3/uL Final    Neutrophils % 12/13/2023 62.1  40.0 - 80.0 % Final    Immature Granulocytes %, Automated 12/13/2023 0.0  0.0 - 0.9 % Final    Lymphocytes % 12/13/2023 22.4  13.0 - 44.0 % Final    Monocytes % 12/13/2023 10.1  2.0 - 10.0 % Final    Eosinophils % 12/13/2023 4.9  0.0 - 6.0 % Final    Basophils % 12/13/2023 0.5  0.0 - 2.0 % Final    Neutrophils Absolute 12/13/2023 2.52  1.60 - 5.50 x10*3/uL Final    Immature Granulocytes Absolute, Au* 12/13/2023 0.00  0.00 - 0.50 x10*3/uL Final    Lymphocytes Absolute 12/13/2023 0.91  0.80 - 3.00 x10*3/uL Final    Monocytes Absolute 12/13/2023 0.41  0.05 - 0.80 x10*3/uL Final    Eosinophils Absolute 12/13/2023 0.20  0.00 - 0.40 x10*3/uL Final    Basophils Absolute 12/13/2023 0.02  0.00 - 0.10 x10*3/uL Final    Glucose 12/13/2023 65 (L)  74 - 99 mg/dL Final    Sodium 12/13/2023 142  136 - 145 mmol/L Final    Potassium 12/13/2023 4.0  3.5 - 5.3 mmol/L Final    Chloride 12/13/2023 106  98 - 107 mmol/L Final    Bicarbonate 12/13/2023 28  21 - 32 mmol/L Final    Anion Gap 12/13/2023 12  10 - 20 mmol/L Final    Urea Nitrogen 12/13/2023 16  6 - 23 mg/dL Final    Creatinine 12/13/2023 1.03  0.50 - 1.30 mg/dL Final    eGFR 12/13/2023 69  >60 mL/min/1.73m*2 Final    Calcium 12/13/2023 9.6  8.6 - 10.6  mg/dL Final    Albumin 12/13/2023 4.0  3.4 - 5.0 g/dL Final    Alkaline Phosphatase 12/13/2023 83  33 - 136 U/L Final    Total Protein 12/13/2023 7.0  6.4 - 8.2 g/dL Final    AST 12/13/2023 16  9 - 39 U/L Final    Bilirubin, Total 12/13/2023 0.7  0.0 - 1.2 mg/dL Final    ALT 12/13/2023 12  10 - 52 U/L Final    LDH 12/13/2023 129  84 - 246 U/L Final    Protime 12/13/2023 10.7  9.8 - 12.8 seconds Final    INR 12/13/2023 1.0  0.9 - 1.1 Final     Radiology Result:  I have reviewed the latest Imaging in PACS and the findings are noted in this note. I discussed the results of the latest imaging with the patient. All previous imaging were reviewed at the time it was completed. Full records are available in the EMR for review as well.     MR brain w and wo IV contrast    Result Date: 11/10/2023  Impression: Possible rim enhancing 6 mm lesion within the right thalamus in region of punctate enhancement seen on previous MRI 07/27/2023. However, this lesion is only visualized on axial postcontrast images. No clear visualization on coronal/sagittal reformats or fat saturated postcontrast images. No associated hemorrhage or edema. Close interval follow-up recommended.   No other lesion visualized. No acute intracranial abnormality. Mild microangiopathic disease noted.   Signed by: Oralia Johnson 11/10/2023 8:42 AM Dictation workstation:   [id number]    NM PET CT melanoma restaging    Result Date: 12/8/2023  Impression: 1. Interval resolution of the hypermetabolic activity of the left forearm with a residual left forearm hypermetabolic focus, residual disease can not be excluded. 2. Interval increase in hypermetabolic activity and size of a metastatic left axillary lymph node. 3. Interval resolution of a right cervical lymph node hypermetabolic activity. Stable to mildly improved hypermetabolic mediastinal lymph nodes consistent with metastasis. 4. No other new sites of hypermetabolic metastatic disease.   I personally reviewed  the image(s) / study and agree with the findings and interpretation as stated. This study was interpreted at [facility].   MACRO: None.   Signed by: Jorge Luis Harris 12/8/2023 2:19 PM Dictation workstation:   [id number]          Pathology Results:  I have reviewed the full pathology report recorded in the EMR. The pertinent portions indicating diagnosis are listed here in the note. for details please refer to the full report recorded in the EMR.    Dermatopathology [May 18 2022 2:05PM] ([id number])     Specimens: SKIN, LEFT SUPERIOR FOREARM /SKIN, LEFT SUPERIOR LATERAL FOREARM /SKIN, LEFT FOREARM INFERIOR /Received in formalin is a tan piece of skin measuring [id number]. The     Name JONAH MELVIN     Accession #: [id number]   Pathologist:  ZEN JULES MD   Date of Procedure: 5/11/2022   Date Received: 5/12/2022   Date Reported 5/18/2022   Submitting Physician: ESTEPHANIA LANDEROS DO   Location: [facility] Other External #       Case is Amended   FINAL DIAGNOSIS    A. SKIN, LEFT SUPERIOR FOREARM, BIOPSY:   MELANOMA IN SITU, LENTIGO MALIGNA TYPE, PRESENT ON THE DEEP AND PERIPHERAL MARGIN, SEE NOTE.     Note: Microscopic examination reveals a specimen that extends into the mid reticular dermis. There is  dense solar elastosis, and there is an asymmetric proliferation of nested and single melanocytes along the dermal-epidermal junction. It appears that these three biopsies may be from the same lesion, and the synoptic report will reflect the pathology  of the three lesions.     B. SKIN, LEFT SUPERIOR LATERAL FOREARM, BIOPSY:   MALIGNANT MELANOMA, BRESLOW THICKNESS AT LEAST 1.8 MM, PRESENT ON THE DEEP AND PERIPHERAL MARGIN, SEE NOTE.     Note: Microscopic examination reveals a specimen  that extends into the subcutaneous fat. There are heavily pigmented epithelioid cells with dense solar elastosis. There also appears to be an area of vascular invasion. These   cells stain strongly with  antibodies against SOX10. All control slides  stain appropriately.     The lack of epidermal attachment raises the possibility of metastatic or recurrent melanoma. This case has been amended to change lymphovascular invasion from not identified to present in the synoptic report.      C. SKIN, LEFT FOREARM INFERIOR:   MALIGNANT MELANOMA, BRESLOW THICKNESS 4.3 MM, PRESENT ON THE PERIPHERAL MARGIN,   SEE NOTE.     Note: Microscopic examination reveals a specimen that extends into the subcutaneous fat. There is a proliferation  of nested and single atypical melanocytes throughout all layers of the epidermis. There is moderate solar elastosis, and there are heavily pigmented epithelioid and spindled cells in the dermis. The melanocytes stain with antibodies against SOX10. All  control slides stain appropriately.     Electronically Signed Out by BELTRAN JULES M.D.     DISEASE RELEVANT ALTERATIONS NOT DETECTED:   Negative for BRAF V600.     GENOMIC ALTERATIONS FOUND ON GUARDANT 360  NF1 R416  TP53  TMB- 47.5  MSI-High: Not detected.     Assessment and Plan:   Assessment/Plan      Mr. Gokul Harmon is a 89 y.o. 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 Please see the evolution of the case listed above in the cancer history.     Today,   We had a [facility] conversation today again. Mr. Harmon understands that this is a palliative regimen. Considering all the commitment that is involved in getting to treatment, he wants to forgo the treatment. He wants to call us back if he needs us, but does not want to pursue any surveillance either. I will call him again in 3 months to see if he is doing well.      DISCLAIMER:   In preparing for this visit and writing this note, I reviewed all the previous electronic medical records (labs, imaging and medical charts) of the patient available in the physician portal. Significant findings which helped in decision making are recorded  in this chart.     The plan was discussed  with the patient. We gave him ample opportunities to ask questions. All questions were answered to his satisfaction and he verbalized understanding.       Magnus Adams MD    Hematology and Oncology     Phone: [phone]  Fax: [phone].

## 2024-05-15 ENCOUNTER — OFFICE VISIT (OUTPATIENT)
Dept: HEMATOLOGY/ONCOLOGY | Facility: CLINIC | Age: 89
End: 2024-05-15
Payer: MEDICARE

## 2024-05-15 VITALS
WEIGHT: 164.24 LBS | OXYGEN SATURATION: 96 % | HEART RATE: 62 BPM | BODY MASS INDEX: 29.09 KG/M2 | SYSTOLIC BLOOD PRESSURE: 112 MMHG | RESPIRATION RATE: 18 BRPM | DIASTOLIC BLOOD PRESSURE: 64 MMHG | TEMPERATURE: 97.5 F

## 2024-05-15 DIAGNOSIS — Z71.89 GOALS OF CARE, COUNSELING/DISCUSSION: Primary | ICD-10-CM

## 2024-05-15 DIAGNOSIS — R59.0 LYMPHADENOPATHY, HILAR: ICD-10-CM

## 2024-05-15 DIAGNOSIS — C43.62 MALIGNANT MELANOMA OF ARM, LEFT (MULTI): ICD-10-CM

## 2024-05-15 PROCEDURE — 1036F TOBACCO NON-USER: CPT | Performed by: INTERNAL MEDICINE

## 2024-05-15 PROCEDURE — 99215 OFFICE O/P EST HI 40 MIN: CPT | Performed by: INTERNAL MEDICINE

## 2024-05-15 PROCEDURE — 1159F MED LIST DOCD IN RCRD: CPT | Performed by: INTERNAL MEDICINE

## 2024-05-15 PROCEDURE — 1126F AMNT PAIN NOTED NONE PRSNT: CPT | Performed by: INTERNAL MEDICINE

## 2024-05-15 ASSESSMENT — ENCOUNTER SYMPTOMS
DIARRHEA: 0
SORE THROAT: 0
FATIGUE: 1
CHEST TIGHTNESS: 0
HEMATURIA: 0
LIGHT-HEADEDNESS: 0
TROUBLE SWALLOWING: 0
BRUISES/BLEEDS EASILY: 0
LEG SWELLING: 0
VOICE CHANGE: 0
FEVER: 0
CONFUSION: 0
SCLERAL ICTERUS: 0
CHILLS: 0
BACK PAIN: 0
EYE PROBLEMS: 0
FLANK PAIN: 0
ABDOMINAL PAIN: 0
BLOOD IN STOOL: 0
CONSTIPATION: 0
HOT FLASHES: 0
ARTHRALGIAS: 0
DYSURIA: 0
SEIZURES: 0
DIZZINESS: 0
SHORTNESS OF BREATH: 0
NECK PAIN: 0
ADENOPATHY: 0
NUMBNESS: 0
FREQUENCY: 0
EXTREMITY WEAKNESS: 0
HEMOPTYSIS: 0
DEPRESSION: 0
NERVOUS/ANXIOUS: 0
VOMITING: 0
NAUSEA: 0
PALPITATIONS: 0
MYALGIAS: 0
APPETITE CHANGE: 0
DIFFICULTY URINATING: 0

## 2024-05-15 ASSESSMENT — PAIN SCALES - GENERAL: PAINLEVEL: 0-NO PAIN

## 2024-05-16 NOTE — PROGRESS NOTES
Patient ID: Gokul Harmon is a 89 y.o. male.  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 Gokul Harmon is referred to Medical Oncology team by Dr. Wander Ortiz for comanagement of unresectable melanoma of the left forearm. He met with our team  on 7/19/22.     Mr. Harmon presented to Dr. Ortiz on May 23, 2022 (referred by Dr. Fozia Cortes and Dr. 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      Mr. Harmon had noticed a small boil like lesion on the left forearm somewhere in March 2022 which grew quickly and darkened his skin very quickly till it assumed current proportions. The main lesion was 3cm in dimension when he met with Dr. Ortiz.  Most of his forearm was called to have ecchymosis, but he does have a thick ulcerated melanoma on the left forearm (dorsal aspect) which is actively bleeding. He consented for Ipilimumab and Nivolumab and was started on CHECKMATE 511 protocol.      After 4 cycles, progressive disease was noted on the PET scan on 10/13/2022. MRI brain showed 2 very small focii suspicious for mets (10/19/2022). He met me on 11/2/22. We discussed chemotherapy with CVT regimen versus hospice. He sought one more day  and we spoke on 11/3/2022. He agreed to taking chemotherapy. Consent signed on 11/09/2022. After 2 cycles of chemotherapy, his MRI and PET scan on 12/19/2022 showed tremendous improvement. Continues with chemotherapy.      On 02/22/23, he reported extreme fatigue with last chemotherapy. Hence, we reduced the dose by 33%. Imaging on 03/10/23 showed continued improvement in the melanoma mass. I further truncated the infusions to temodar only due to his continued fatigue.  In  03/20/23, infusions were eliminated to help with fatigue, but he came back on 4/5/23 with even worse fatigue.      I held the infusions for now since 4/5/23. RT tot he right forearm mass was given on 4/10/23 to 4/21/23 (36 Gy in 6 fractions). He came to see me on 5/10/23 and his overall performance status was quite poor. We recommended hospice. However, he wanted  to follow up.      I called him on 6/28/23 and he was feeling OK. We ordered restaging scans. PET scan done on 7/20/23 showed slightly progressive disease. MRI brain on 7/27/23 showed one questionable spot in the thalamus. Repeat MRI brain on 7/27/23 showed a questionable  spot in the thalamus, but no other overt disease. We recommend continued surveillance owing to his advanced age and poor PS. We saw the results of the liquid biopsy. It seems that his TMB is quite high (~47 Nut/Mb).      He got MRI brain on 11/10/23 which was unremarkable. But he could not get his PET scan done (he ate in the morning). His PET scan was completed on 12/8/23 which showed complete resolution of metabolic activity in the left forearm mass, but increase in activity and size of the left axillary lesion. I spoke to him about the tumor board discussion, and he is not interested in pursuing surgical resection.     We discussed restarting chemotherapy versus waiting for the opening of the [facility] trial. He is not keen on chemotherapy, so he wants to wait for the [facility] trial to open. We talked again on 1/17/24 and told him that [facility] trial is open, however, it may be a while before we can enroll him. He wanted to start temodar again, however, after understanding that this is chemotherapy and may cause fatigue, he wanted to wait another 4 weeks.     I called him back on 2/14/23 and Mr. Harmon told me that he does not want to pursue any treatment at this point. He also reiterated that he has experience with hospice from his late wife and daughter and understands that his treatment is  "palliative. He feels that the entire process of treatment is too hard and wanted some time to think about it.    He met us back on 5/15/2024 and we had a discussion regarding goals of care. Mr. Harmon opted no more surveillance/treatment. He wanted to focus on quality of life. He is well aware of hospice services and knows how to call them. He was accompanied by his son-in-law and he was in attendance of this conversation.      Treatment History:   Systemic Treatment  1. CHECKMATE 511 protocol  C1- 07/25/2022  C2- 08/17/2022  C3- 09/07/2022  C4- 09/28/2022- Progressive disease noted.     2. CVT regimen  C1- 11/14/22 to 11/16/22  C2- 12/05/22 to 12/07/22  C3- 12/27/22 to 12/29/22  C4- 01/16/23 to 01/18/23  C5- 02/06/23 to 02/08/23  C6- 02/27/23 and 02/28/23- 33% dose reduced- Day 3 ommitted  C7- 03/20 to 03/24/23- Only Temodar. Vinblastine and Cisplatin omitted completely.       Subjective   Please refer to \"Notes/Cancer History\" above for complete History of present illness.     Mr Gokul Harmon is doing well. He came to the clinic with his son-in-law. He recalls having a very good quality of life and plans to do target practice with his pistol this summer.      Review of Systems:   Review of Systems   Constitutional:  Positive for fatigue. Negative for appetite change, chills and fever.   HENT:   Negative for hearing loss, mouth sores, sore throat, trouble swallowing and voice change.    Eyes:  Negative for eye problems and icterus.   Respiratory:  Negative for chest tightness, hemoptysis and shortness of breath.    Cardiovascular:  Negative for chest pain, leg swelling and palpitations.   Gastrointestinal:  Negative for abdominal pain, blood in stool, constipation, diarrhea, nausea and vomiting.   Endocrine: Negative for hot flashes.   Genitourinary:  Negative for difficulty urinating, dysuria, frequency, hematuria, nocturia and pelvic pain.    Musculoskeletal:  Negative for arthralgias, back pain, flank pain, " gait problem, myalgias and neck pain.   Skin:  Negative for itching and rash.   Neurological:  Negative for dizziness, extremity weakness, gait problem, light-headedness, numbness and seizures.   Hematological:  Negative for adenopathy. Does not bruise/bleed easily.   Psychiatric/Behavioral:  Negative for confusion and depression. The patient is not nervous/anxious.          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      chlorpheniramine (Chlor-Trimeton) 4 mg tablet Take 1 tablet (4 mg) by mouth every 6 hours if needed.      furosemide (Lasix) 20 mg tablet Take 1 tablet (20 mg) by mouth once daily.      lidocaine-prilocaine (Emla) 2.5-2.5 % cream Apply topically.  Apply topically as needed to Mediport site 30-45 minutes before being accessed,      ondansetron ODT (Zofran-ODT) 4 mg disintegrating tablet Take 1 tablet (4 mg) by mouth every 8 hours if needed for nausea or vomiting.      predniSONE (Deltasone) 10 mg tablet Take 1 tablet (10 mg) by mouth 2 times a day.      temozolomide (Temodar) 100 mg capsule Take 1 capsule (100 mg total) by mouth.      temozolomide (Temodar) 140 mg capsule Take 1 capsule (140 mg total) by mouth.       No current facility-administered medications on file prior to visit.         Performance Status:  Symptomatic; fully  ambulatory     Vitals and Measurements:   /64 (BP Location: Left arm, Patient Position: Sitting, BP Cuff Size: Adult)   Pulse 62   Temp 36.4 °C (97.5 °F) (Core)   Resp 18   Wt 74.5 kg (164 lb 3.9 oz)   SpO2 96%   BMI 29.09 kg/m²       Physical Exam:   Physical Exam  Constitutional:       General: He is not in acute distress.     Appearance: Normal appearance. He is not ill-appearing.      Comments: Well appearing nonagenarian. Walks with a cane for support.    HENT:      Head: Normocephalic and atraumatic.   Eyes:      Extraocular Movements: Extraocular movements intact.      Conjunctiva/sclera: Conjunctivae normal.      Pupils: Pupils are equal, round, and reactive to light.   Cardiovascular:      Rate and Rhythm: Normal rate and regular rhythm.      Pulses: Normal pulses.   Pulmonary:      Effort: Pulmonary effort is normal.      Breath sounds: No wheezing, rhonchi or rales.   Abdominal:      General: Abdomen is flat. Bowel sounds are normal.      Palpations: There is no mass.      Tenderness: There is no abdominal tenderness. There is no rebound.   Musculoskeletal:         General: Normal range of motion.      Cervical back: Neck supple.   Skin:     General: Skin is warm.      Comments: The entire right forearm is black from the melanin pigment. However, the melanoma on the right forearm is pretty flat and does not bleed/weep anymore.    Neurological:      General: No focal deficit present.      Mental Status: He is alert and oriented to person, place, and time.   Psychiatric:         Mood and Affect: Mood normal.         Behavior: Behavior normal.         Thought Content: Thought content normal.         Judgment: Judgment normal.       Lab Results:  I have reviewed these laboratory results:     No visits with results within 1 Month(s) from this visit.   Latest known visit with results is:   Lab on 12/13/2023   Component Date Value Ref Range Status    WBC 12/13/2023 4.1 (L)  4.4 - 11.3 x10*3/uL Final     nRBC 12/13/2023    Final    RBC 12/13/2023 3.85 (L)  4.50 - 5.90 x10*6/uL Final    Hemoglobin 12/13/2023 12.5 (L)  13.5 - 17.5 g/dL Final    Hematocrit 12/13/2023 38.6 (L)  41.0 - 52.0 % Final    MCV 12/13/2023 100  80 - 100 fL Final    MCH 12/13/2023 32.5  26.0 - 34.0 pg Final    MCHC 12/13/2023 32.4  32.0 - 36.0 g/dL Final    RDW 12/13/2023 13.9  11.5 - 14.5 % Final    Platelets 12/13/2023 197  150 - 450 x10*3/uL Final    Neutrophils % 12/13/2023 62.1  40.0 - 80.0 % Final    Immature Granulocytes %, Automated 12/13/2023 0.0  0.0 - 0.9 % Final    Lymphocytes % 12/13/2023 22.4  13.0 - 44.0 % Final    Monocytes % 12/13/2023 10.1  2.0 - 10.0 % Final    Eosinophils % 12/13/2023 4.9  0.0 - 6.0 % Final    Basophils % 12/13/2023 0.5  0.0 - 2.0 % Final    Neutrophils Absolute 12/13/2023 2.52  1.60 - 5.50 x10*3/uL Final    Immature Granulocytes Absolute, Au* 12/13/2023 0.00  0.00 - 0.50 x10*3/uL Final    Lymphocytes Absolute 12/13/2023 0.91  0.80 - 3.00 x10*3/uL Final    Monocytes Absolute 12/13/2023 0.41  0.05 - 0.80 x10*3/uL Final    Eosinophils Absolute 12/13/2023 0.20  0.00 - 0.40 x10*3/uL Final    Basophils Absolute 12/13/2023 0.02  0.00 - 0.10 x10*3/uL Final    Glucose 12/13/2023 65 (L)  74 - 99 mg/dL Final    Sodium 12/13/2023 142  136 - 145 mmol/L Final    Potassium 12/13/2023 4.0  3.5 - 5.3 mmol/L Final    Chloride 12/13/2023 106  98 - 107 mmol/L Final    Bicarbonate 12/13/2023 28  21 - 32 mmol/L Final    Anion Gap 12/13/2023 12  10 - 20 mmol/L Final    Urea Nitrogen 12/13/2023 16  6 - 23 mg/dL Final    Creatinine 12/13/2023 1.03  0.50 - 1.30 mg/dL Final    eGFR 12/13/2023 69  >60 mL/min/1.73m*2 Final    Calcium 12/13/2023 9.6  8.6 - 10.6 mg/dL Final    Albumin 12/13/2023 4.0  3.4 - 5.0 g/dL Final    Alkaline Phosphatase 12/13/2023 83  33 - 136 U/L Final    Total Protein 12/13/2023 7.0  6.4 - 8.2 g/dL Final    AST 12/13/2023 16  9 - 39 U/L Final    Bilirubin, Total 12/13/2023 0.7  0.0 - 1.2 mg/dL Final    ALT  12/13/2023 12  10 - 52 U/L Final    LDH 12/13/2023 129  84 - 246 U/L Final    Protime 12/13/2023 10.7  9.8 - 12.8 seconds Final    INR 12/13/2023 1.0  0.9 - 1.1 Final     Radiology Result:  I have reviewed the latest Imaging in PACS and the findings are noted in this note. I discussed the results of the latest imaging with the patient. All previous imaging were reviewed at the time it was completed. Full records are available in the EMR for review as well.     MR brain w and wo IV contrast    Result Date: 11/10/2023  Impression: Possible rim enhancing 6 mm lesion within the right thalamus in region of punctate enhancement seen on previous MRI 07/27/2023. However, this lesion is only visualized on axial postcontrast images. No clear visualization on coronal/sagittal reformats or fat saturated postcontrast images. No associated hemorrhage or edema. Close interval follow-up recommended.   No other lesion visualized. No acute intracranial abnormality. Mild microangiopathic disease noted.   Signed by: Oralia Johnson 11/10/2023 8:42 AM Dictation workstation:   [id number]    NM PET CT melanoma restaging    Result Date: 12/8/2023  Impression: 1. Interval resolution of the hypermetabolic activity of the left forearm with a residual left forearm hypermetabolic focus, residual disease can not be excluded. 2. Interval increase in hypermetabolic activity and size of a metastatic left axillary lymph node. 3. Interval resolution of a right cervical lymph node hypermetabolic activity. Stable to mildly improved hypermetabolic mediastinal lymph nodes consistent with metastasis. 4. No other new sites of hypermetabolic metastatic disease.   I personally reviewed the image(s) / study and agree with the findings and interpretation as stated. This study was interpreted at [facility].   MACRO: None.   Signed by: Jorge Luis Harris 12/8/2023 2:19 PM Dictation workstation:   [id number]          Pathology  Results:  I have reviewed the full pathology report recorded in the EMR. The pertinent portions indicating diagnosis are listed here in the note. for details please refer to the full report recorded in the EMR.    Dermatopathology [May 18 2022 2:05PM] ([id number])     Specimens: SKIN, LEFT SUPERIOR FOREARM /SKIN, LEFT SUPERIOR LATERAL FOREARM /SKIN, LEFT FOREARM INFERIOR /Received in formalin is a tan piece of skin measuring [id number]. The     Name JONAH MELVIN     Accession #: [id number]   Pathologist:  ZEN JULES MD   Date of Procedure: 5/11/2022   Date Received: 5/12/2022   Date Reported 5/18/2022   Submitting Physician: ESTEPHANIA LANDEROS DO   Location: [facility] Other External #       Case is Amended   FINAL DIAGNOSIS    A. SKIN, LEFT SUPERIOR FOREARM, BIOPSY:   MELANOMA IN SITU, LENTIGO MALIGNA TYPE, PRESENT ON THE DEEP AND PERIPHERAL MARGIN, SEE NOTE.     Note: Microscopic examination reveals a specimen that extends into the mid reticular dermis. There is  dense solar elastosis, and there is an asymmetric proliferation of nested and single melanocytes along the dermal-epidermal junction. It appears that these three biopsies may be from the same lesion, and the synoptic report will reflect the pathology  of the three lesions.     B. SKIN, LEFT SUPERIOR LATERAL FOREARM, BIOPSY:   MALIGNANT MELANOMA, BRESLOW THICKNESS AT LEAST 1.8 MM, PRESENT ON THE DEEP AND PERIPHERAL MARGIN, SEE NOTE.     Note: Microscopic examination reveals a specimen  that extends into the subcutaneous fat. There are heavily pigmented epithelioid cells with dense solar elastosis. There also appears to be an area of vascular invasion. These   cells stain strongly with antibodies against SOX10. All control slides  stain appropriately.     The lack of epidermal attachment raises the possibility of metastatic or recurrent melanoma. This case has been amended to change lymphovascular invasion from not identified to present in the synoptic  report.      C. SKIN, LEFT FOREARM INFERIOR:   MALIGNANT MELANOMA, BRESLOW THICKNESS 4.3 MM, PRESENT ON THE PERIPHERAL MARGIN,   SEE NOTE.     Note: Microscopic examination reveals a specimen that extends into the subcutaneous fat. There is a proliferation  of nested and single atypical melanocytes throughout all layers of the epidermis. There is moderate solar elastosis, and there are heavily pigmented epithelioid and spindled cells in the dermis. The melanocytes stain with antibodies against SOX10. All  control slides stain appropriately.     Electronically Signed Out by LIZZ. АНДРЕЙ JULES     DISEASE RELEVANT ALTERATIONS NOT DETECTED:   Negative for BRAF V600.     GENOMIC ALTERATIONS FOUND ON GUARDANT 360  NF1 R416  TP53  TMB- 47.5  MSI-High: Not detected.     Assessment and Plan:   Assessment/Plan      Mr. Gokul Harmon is a 89 y.o. 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 Please see the evolution of the case listed above in the cancer history.     Today,   We discussed that there is a certain evidence of malignancy (physical exam positive for left axillary lymph node which was the size of a golf ball) and no treatment we can offer at this time will cure him. He understand this paradox, and also understands that without treatment the cancer is sure to progress. However, he recalls that when he was getting treatment, the cancer therapy had wiped him out pretty hard. Hence, he wants to forgoe any more treatment or surveillance. He wants to continue focusing on quality of life. He knows the process of enrolling in hospice, but does not want it right now.     # Metastatic recurrent melanoma  - No more treatment offered.   - Patient will follow up with hospice on his own.      DISCLAIMER:   In preparing for this visit and writing this note, I reviewed all the previous electronic medical records (labs, imaging and medical charts) of the patient available in the physician portal. Significant  findings which helped in decision making are recorded  in this chart.     The plan was discussed with the patient. We gave him ample opportunities to ask questions. All questions were answered to his satisfaction and he verbalized understanding.     Magnus Adams MD    Division of Hematology and Oncology  [facility]    Co-Director Sarcoma and Cutaneous Oncology  [facility]    Phone: [phone]  Fax: [phone]

## 2024-10-15 RX ORDER — ATORVASTATIN CALCIUM 10 MG/1
10 TABLET, FILM COATED ORAL EVERY OTHER DAY
Qty: 45 TABLET | Refills: 0 | Status: SHIPPED | OUTPATIENT
Start: 2024-10-15

## 2025-01-30 ENCOUNTER — OFFICE VISIT (OUTPATIENT)
Dept: FAMILY MEDICINE CLINIC | Age: 89
End: 2025-01-30

## 2025-01-30 VITALS
HEART RATE: 86 BPM | TEMPERATURE: 97 F | WEIGHT: 166 LBS | DIASTOLIC BLOOD PRESSURE: 60 MMHG | BODY MASS INDEX: 26 KG/M2 | OXYGEN SATURATION: 97 % | SYSTOLIC BLOOD PRESSURE: 118 MMHG

## 2025-01-30 DIAGNOSIS — H61.23 IMPACTED CERUMEN, BILATERAL: Primary | ICD-10-CM

## 2025-01-30 RX ORDER — ATORVASTATIN CALCIUM 10 MG/1
10 TABLET, FILM COATED ORAL EVERY OTHER DAY
Qty: 45 TABLET | Refills: 0 | Status: SHIPPED | OUTPATIENT
Start: 2025-01-30

## 2025-01-30 RX ORDER — ATORVASTATIN CALCIUM 10 MG/1
10 TABLET, FILM COATED ORAL EVERY OTHER DAY
Qty: 45 TABLET | Refills: 0 | Status: CANCELLED | OUTPATIENT
Start: 2025-01-30

## 2025-01-30 NOTE — PROGRESS NOTES
Chief Complaint   Cerumen Impaction    History of Present Illness   Source of history provided by:  patient.    Shorty Boswell is a 89 y.o. old male presenting to the walk in clinic for ear fullness to the bilateral ears for the past few weeks.  Reports more notable symptoms on the right side, was at his audiologist who told him that there was bilateral impaction and he needed flushing.  Pt does report decreased hearing. There is no pain associated with the complaint. Pt has tried nothing OTC without relief. Pt reports a history of cerumen impaction in the past. Denies any pain, bleeding, discharge from the ear, fever, chills, lethargy, or additional URI symptoms.    ROS    Unless otherwise stated in this report or unable to obtain because of the patient's clinical or mental status as evidenced by the medical record, this patients's positive and negative responses for Review of Systems, constitutional, psych, eyes, ENT, cardiovascular, respiratory, gastrointestinal, neurological, genitourinary, musculoskeletal, integument systems and systems related to the presenting problem are either stated in the preceding or were not pertinent or were negative for the symptoms and/or complaints related to the medical problem.    Physical Exam         VS:  /60   Pulse 86   Temp 97 °F (36.1 °C)   Wt 75.3 kg (166 lb)   SpO2 97%   BMI 26.00 kg/m²    Oxygen Saturation Interpretation: Normal.    Constitutional:  Alert, development consistent with age.  Eyes:  PERRL, EOMI, no discharge or conjunctival injection.  Ears:  External Ears: Normal bilaterally.              TM's & External Canals:  Pre procedure: Bilateral canal with cerumen impaction, TMs not able to be visualized.      Post procedure: Bilateral TMs translucent without erythema or perforation, translucent. No drainage or active bleeding noted.   Nose:  No drainage or active bleeding.  Mouth:  Mucous membranes moist without lesions, tongue and gums

## 2025-03-26 ENCOUNTER — HOSPITAL ENCOUNTER (INPATIENT)
Age: 89
LOS: 3 days | Discharge: NURSING, REHAB OR LONG-TERM CARE | End: 2025-03-29
Attending: EMERGENCY MEDICINE | Admitting: HOSPITALIST
Payer: MEDICARE

## 2025-03-26 ENCOUNTER — APPOINTMENT (OUTPATIENT)
Dept: GENERAL RADIOLOGY | Age: 89
End: 2025-03-26
Payer: MEDICARE

## 2025-03-26 ENCOUNTER — APPOINTMENT (OUTPATIENT)
Dept: CT IMAGING | Age: 89
End: 2025-03-26
Payer: MEDICARE

## 2025-03-26 DIAGNOSIS — I26.99 ACUTE PULMONARY EMBOLISM WITHOUT ACUTE COR PULMONALE, UNSPECIFIED PULMONARY EMBOLISM TYPE (HCC): Primary | ICD-10-CM

## 2025-03-26 DIAGNOSIS — S30.0XXA CONTUSION OF SACRUM, INITIAL ENCOUNTER: ICD-10-CM
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
## 2025-03-26 PROBLEM — I48.92 ATRIAL FIBRILLATION AND FLUTTER: Status: ACTIVE | Noted: 2025-03-26

## 2025-03-26 PROBLEM — E87.20 LACTIC ACIDOSIS: Status: ACTIVE | Noted: 2025-03-26

## 2025-03-26 PROBLEM — I48.91 ATRIAL FIBRILLATION AND FLUTTER: Status: ACTIVE | Noted: 2025-03-26

## 2025-03-26 PROBLEM — C43.9 METASTATIC MELANOMA (HCC): Status: ACTIVE | Noted: 2025-03-26
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
## 2025-03-26 LAB
ALBUMIN SERPL-MCNC: 3.2 G/DL (ref 3.5–5.2)
ALP SERPL-CCNC: 111 U/L (ref 40–129)
ALT SERPL-CCNC: 22 U/L (ref 0–40)
ANION GAP SERPL CALCULATED.3IONS-SCNC: 17 MMOL/L (ref 7–16)
AST SERPL-CCNC: 35 U/L (ref 0–39)
BASOPHILS # BLD: 0.03 K/UL (ref 0–0.2)
BASOPHILS NFR BLD: 1 % (ref 0–2)
BILIRUB SERPL-MCNC: 1.1 MG/DL (ref 0–1.2)
BILIRUB UR QL STRIP: ABNORMAL
BUN SERPL-MCNC: 12 MG/DL (ref 6–23)
CALCIUM SERPL-MCNC: 9 MG/DL (ref 8.6–10.2)
CHLORIDE SERPL-SCNC: 94 MMOL/L (ref 98–107)
CK SERPL-CCNC: 49 U/L (ref 20–200)
CLARITY UR: CLEAR
CO2 SERPL-SCNC: 24 MMOL/L (ref 22–29)
COLOR UR: ABNORMAL
CREAT SERPL-MCNC: 0.8 MG/DL (ref 0.7–1.2)
EKG ATRIAL RATE: 277 BPM
EKG P AXIS: 69 DEGREES
EKG Q-T INTERVAL: 420 MS
EKG QRS DURATION: 112 MS
EKG QTC CALCULATION (BAZETT): 513 MS
EKG R AXIS: -48 DEGREES
EKG T AXIS: 87 DEGREES
EKG VENTRICULAR RATE: 90 BPM
EOSINOPHIL # BLD: 0.09 K/UL (ref 0.05–0.5)
EOSINOPHILS RELATIVE PERCENT: 1 % (ref 0–6)
ERYTHROCYTE [DISTWIDTH] IN BLOOD BY AUTOMATED COUNT: 13.6 % (ref 11.5–15)
ERYTHROCYTE [DISTWIDTH] IN BLOOD BY AUTOMATED COUNT: 13.7 % (ref 11.5–15)
GFR, ESTIMATED: 84 ML/MIN/1.73M2
GLUCOSE SERPL-MCNC: 100 MG/DL (ref 74–99)
GLUCOSE UR STRIP-MCNC: NEGATIVE MG/DL
HCT VFR BLD AUTO: 35.6 % (ref 37–54)
HCT VFR BLD AUTO: 39.7 % (ref 37–54)
HGB BLD-MCNC: 11.4 G/DL (ref 12.5–16.5)
HGB BLD-MCNC: 13 G/DL (ref 12.5–16.5)
HGB UR QL STRIP.AUTO: NEGATIVE
IMM GRANULOCYTES # BLD AUTO: 0.03 K/UL (ref 0–0.58)
IMM GRANULOCYTES NFR BLD: 1 % (ref 0–5)
KETONES UR STRIP-MCNC: ABNORMAL MG/DL
LACTATE BLDV-SCNC: 4.7 MMOL/L (ref 0.5–2.2)
LACTATE BLDV-SCNC: 5 MMOL/L (ref 0.5–1.9)
LEUKOCYTE ESTERASE UR QL STRIP: NEGATIVE
LYMPHOCYTES NFR BLD: 0.67 K/UL (ref 1.5–4)
LYMPHOCYTES RELATIVE PERCENT: 10 % (ref 20–42)
MAGNESIUM SERPL-MCNC: 1.8 MG/DL (ref 1.6–2.6)
MCH RBC QN AUTO: 31.2 PG (ref 26–35)
MCH RBC QN AUTO: 31.6 PG (ref 26–35)
MCHC RBC AUTO-ENTMCNC: 32 G/DL (ref 32–34.5)
MCHC RBC AUTO-ENTMCNC: 32.7 G/DL (ref 32–34.5)
MCV RBC AUTO: 96.6 FL (ref 80–99.9)
MCV RBC AUTO: 97.5 FL (ref 80–99.9)
MONOCYTES NFR BLD: 0.45 K/UL (ref 0.1–0.95)
MONOCYTES NFR BLD: 7 % (ref 2–12)
NEUTROPHILS NFR BLD: 80 % (ref 43–80)
NEUTS SEG NFR BLD: 5.18 K/UL (ref 1.8–7.3)
NITRITE UR QL STRIP: NEGATIVE
PARTIAL THROMBOPLASTIN TIME: 26.8 SEC (ref 24.5–35.1)
PARTIAL THROMBOPLASTIN TIME: 74.4 SEC (ref 24.5–35.1)
PH UR STRIP: 5 [PH] (ref 5–8)
PLATELET # BLD AUTO: 198 K/UL (ref 130–450)
PLATELET # BLD AUTO: 213 K/UL (ref 130–450)
PMV BLD AUTO: 8.9 FL (ref 7–12)
PMV BLD AUTO: 9.3 FL (ref 7–12)
POTASSIUM SERPL-SCNC: 3.7 MMOL/L (ref 3.5–5)
PROT SERPL-MCNC: 6.4 G/DL (ref 6.4–8.3)
PROT UR STRIP-MCNC: ABNORMAL MG/DL
RBC # BLD AUTO: 3.65 M/UL (ref 3.8–5.8)
RBC # BLD AUTO: 4.11 M/UL (ref 3.8–5.8)
RBC #/AREA URNS HPF: NORMAL /HPF
SODIUM SERPL-SCNC: 135 MMOL/L (ref 132–146)
SP GR UR STRIP: 1.01 (ref 1–1.03)
T4 FREE SERPL-MCNC: 1.2 NG/DL (ref 0.9–1.7)
TROPONIN I SERPL HS-MCNC: 37 NG/L (ref 0–11)
TROPONIN I SERPL HS-MCNC: 43 NG/L (ref 0–11)
TSH SERPL DL<=0.05 MIU/L-ACNC: 1.43 UIU/ML (ref 0.27–4.2)
UROBILINOGEN UR STRIP-ACNC: 0.2 EU/DL (ref 0–1)
WBC #/AREA URNS HPF: NORMAL /HPF
WBC OTHER # BLD: 6 K/UL (ref 4.5–11.5)
WBC OTHER # BLD: 6.5 K/UL (ref 4.5–11.5)

## 2025-03-26 PROCEDURE — 70450 CT HEAD/BRAIN W/O DYE: CPT

## 2025-03-26 PROCEDURE — 6360000004 HC RX CONTRAST MEDICATION: Performed by: RADIOLOGY

## 2025-03-26 PROCEDURE — 84484 ASSAY OF TROPONIN QUANT: CPT

## 2025-03-26 PROCEDURE — 96374 THER/PROPH/DIAG INJ IV PUSH: CPT

## 2025-03-26 PROCEDURE — 74177 CT ABD & PELVIS W/CONTRAST: CPT

## 2025-03-26 PROCEDURE — 6360000002 HC RX W HCPCS: Performed by: EMERGENCY MEDICINE

## 2025-03-26 PROCEDURE — 84443 ASSAY THYROID STIM HORMONE: CPT

## 2025-03-26 PROCEDURE — 85025 COMPLETE CBC W/AUTO DIFF WBC: CPT

## 2025-03-26 PROCEDURE — 84439 ASSAY OF FREE THYROXINE: CPT

## 2025-03-26 PROCEDURE — 93005 ELECTROCARDIOGRAM TRACING: CPT | Performed by: EMERGENCY MEDICINE

## 2025-03-26 PROCEDURE — 80053 COMPREHEN METABOLIC PANEL: CPT

## 2025-03-26 PROCEDURE — 71046 X-RAY EXAM CHEST 2 VIEWS: CPT

## 2025-03-26 PROCEDURE — 93010 ELECTROCARDIOGRAM REPORT: CPT | Performed by: INTERNAL MEDICINE

## 2025-03-26 PROCEDURE — 72170 X-RAY EXAM OF PELVIS: CPT

## 2025-03-26 PROCEDURE — 2580000003 HC RX 258: Performed by: EMERGENCY MEDICINE

## 2025-03-26 PROCEDURE — APPSS45 APP SPLIT SHARED TIME 31-45 MINUTES

## 2025-03-26 PROCEDURE — 83735 ASSAY OF MAGNESIUM: CPT

## 2025-03-26 PROCEDURE — 83605 ASSAY OF LACTIC ACID: CPT

## 2025-03-26 PROCEDURE — 72125 CT NECK SPINE W/O DYE: CPT

## 2025-03-26 PROCEDURE — 99223 1ST HOSP IP/OBS HIGH 75: CPT | Performed by: HOSPITALIST

## 2025-03-26 PROCEDURE — 85730 THROMBOPLASTIN TIME PARTIAL: CPT

## 2025-03-26 PROCEDURE — 99285 EMERGENCY DEPT VISIT HI MDM: CPT

## 2025-03-26 PROCEDURE — 81001 URINALYSIS AUTO W/SCOPE: CPT

## 2025-03-26 PROCEDURE — 2500000003 HC RX 250 WO HCPCS

## 2025-03-26 PROCEDURE — 85027 COMPLETE CBC AUTOMATED: CPT

## 2025-03-26 PROCEDURE — 82550 ASSAY OF CK (CPK): CPT

## 2025-03-26 PROCEDURE — 2060000000 HC ICU INTERMEDIATE R&B

## 2025-03-26 RX ORDER — HEPARIN SODIUM 10000 [USP'U]/100ML
5-30 INJECTION, SOLUTION INTRAVENOUS CONTINUOUS
Status: DISPENSED | OUTPATIENT
Start: 2025-03-26 | End: 2025-03-28

## 2025-03-26 RX ORDER — HEPARIN SODIUM 1000 [USP'U]/ML
80 INJECTION, SOLUTION INTRAVENOUS; SUBCUTANEOUS PRN
Status: DISCONTINUED | OUTPATIENT
Start: 2025-03-26 | End: 2025-03-29 | Stop reason: HOSPADM

## 2025-03-26 RX ORDER — HEPARIN SODIUM 1000 [USP'U]/ML
80 INJECTION, SOLUTION INTRAVENOUS; SUBCUTANEOUS ONCE
Status: COMPLETED | OUTPATIENT
Start: 2025-03-26 | End: 2025-03-26

## 2025-03-26 RX ORDER — POTASSIUM CHLORIDE 7.45 MG/ML
10 INJECTION INTRAVENOUS PRN
Status: DISCONTINUED | OUTPATIENT
Start: 2025-03-26 | End: 2025-03-29 | Stop reason: HOSPADM

## 2025-03-26 RX ORDER — SODIUM CHLORIDE 9 MG/ML
INJECTION, SOLUTION INTRAVENOUS PRN
Status: DISCONTINUED | OUTPATIENT
Start: 2025-03-26 | End: 2025-03-29 | Stop reason: HOSPADM

## 2025-03-26 RX ORDER — ACETAMINOPHEN 650 MG/1
650 SUPPOSITORY RECTAL EVERY 6 HOURS PRN
Status: DISCONTINUED | OUTPATIENT
Start: 2025-03-26 | End: 2025-03-29 | Stop reason: HOSPADM

## 2025-03-26 RX ORDER — 0.9 % SODIUM CHLORIDE 0.9 %
1000 INTRAVENOUS SOLUTION INTRAVENOUS ONCE
Status: COMPLETED | OUTPATIENT
Start: 2025-03-26 | End: 2025-03-26

## 2025-03-26 RX ORDER — SODIUM CHLORIDE 0.9 % (FLUSH) 0.9 %
5-40 SYRINGE (ML) INJECTION PRN
Status: DISCONTINUED | OUTPATIENT
Start: 2025-03-26 | End: 2025-03-29 | Stop reason: HOSPADM

## 2025-03-26 RX ORDER — SODIUM CHLORIDE 0.9 % (FLUSH) 0.9 %
5-40 SYRINGE (ML) INJECTION EVERY 12 HOURS SCHEDULED
Status: DISCONTINUED | OUTPATIENT
Start: 2025-03-26 | End: 2025-03-29 | Stop reason: HOSPADM

## 2025-03-26 RX ORDER — POLYETHYLENE GLYCOL 3350 17 G/17G
17 POWDER, FOR SOLUTION ORAL DAILY PRN
Status: DISCONTINUED | OUTPATIENT
Start: 2025-03-26 | End: 2025-03-29 | Stop reason: HOSPADM

## 2025-03-26 RX ORDER — ACETAMINOPHEN 325 MG/1
650 TABLET ORAL EVERY 6 HOURS PRN
Status: DISCONTINUED | OUTPATIENT
Start: 2025-03-26 | End: 2025-03-29 | Stop reason: HOSPADM

## 2025-03-26 RX ORDER — MAGNESIUM SULFATE IN WATER 40 MG/ML
2000 INJECTION, SOLUTION INTRAVENOUS PRN
Status: DISCONTINUED | OUTPATIENT
Start: 2025-03-26 | End: 2025-03-29 | Stop reason: HOSPADM

## 2025-03-26 RX ORDER — POTASSIUM CHLORIDE 1500 MG/1
40 TABLET, EXTENDED RELEASE ORAL PRN
Status: DISCONTINUED | OUTPATIENT
Start: 2025-03-26 | End: 2025-03-29 | Stop reason: HOSPADM

## 2025-03-26 RX ORDER — HEPARIN SODIUM 1000 [USP'U]/ML
40 INJECTION, SOLUTION INTRAVENOUS; SUBCUTANEOUS PRN
Status: DISCONTINUED | OUTPATIENT
Start: 2025-03-26 | End: 2025-03-29 | Stop reason: HOSPADM

## 2025-03-26 RX ORDER — IOPAMIDOL 755 MG/ML
75 INJECTION, SOLUTION INTRAVASCULAR
Status: COMPLETED | OUTPATIENT
Start: 2025-03-26 | End: 2025-03-26

## 2025-03-26 RX ADMIN — SODIUM CHLORIDE 1000 ML: 0.9 INJECTION, SOLUTION INTRAVENOUS at 11:45

## 2025-03-26 RX ADMIN — SODIUM CHLORIDE, PRESERVATIVE FREE 10 ML: 5 INJECTION INTRAVENOUS at 20:57

## 2025-03-26 RX ADMIN — HEPARIN SODIUM 5800 UNITS: 1000 INJECTION INTRAVENOUS; SUBCUTANEOUS at 14:36

## 2025-03-26 RX ADMIN — IOPAMIDOL 75 ML: 755 INJECTION, SOLUTION INTRAVENOUS at 13:22

## 2025-03-26 RX ADMIN — HEPARIN SODIUM 18 UNITS/KG/HR: 10000 INJECTION, SOLUTION INTRAVENOUS at 14:39

## 2025-03-26 ASSESSMENT — PAIN - FUNCTIONAL ASSESSMENT: PAIN_FUNCTIONAL_ASSESSMENT: 0-10

## 2025-03-26 ASSESSMENT — PAIN SCALES - GENERAL: PAINLEVEL_OUTOF10: 0

## 2025-03-26 NOTE — ED NOTES
ED to Inpatient Handoff Report    Notified floor that electronic handoff available and patient ready for transport to room 605.    Safety Risks: Risk of falls    Patient in Restraints: no    Constant Observer or Patient : no    Telemetry Monitoring Ordered: Yes          Order to transfer to unit without monitor: NO    Last MEWS: 3 Time completed: 1504    Deterioration Index: 27.55    Vitals:    03/26/25 1431 03/26/25 1435 03/26/25 1440 03/26/25 1504   BP:  (!) 102/58  (!) 99/53   Pulse: 86 87  88   Resp: 19 23  22   Temp:    97.7 °F (36.5 °C)   TempSrc:       SpO2: 97%  98% 97%   Weight:       Height:           Opportunity for questions and clarification was provided.

## 2025-03-26 NOTE — CONSULTS
Inpatient Cardiology Consultation      Reason for Consult:  New onset a fib/a flutter     Consulting Physician: Dr. Freeamn    Requesting Physician:  Karolina Pacheco, MAVIS - CNP     Date of Consultation: 3/27/2025    HISTORY OF PRESENT ILLNESS: 89 y.o.  male, previously seen by Dr. Arias, last seen 6/14/2021 as outpatient for follow-up for syncope    3/26/2025: Presented to the ED for evaluation after mechanical fall, reportedly was walking up stairs, fell backwards onto his buttocks, without hitting his head or loss of consciousness, reportedly was too weak to walk up the stairs, laid on the floor and slept there throughout the night.    Patient in bed, alert and oriented x 3, no obvious signs of distress.  He tells me that he was walking up the steps, mild 1 AM, fell backwards onto his bottom, and due to weakness, not able to get up, he slipped on the floor until his wife came down around 6:30 AM.  He notes that when he fell, his head hit the padded parts of the armrest on his chair, which is very soft, and did not lose consciousness.  He reports he did have a similar episode approximately 3 weeks ago, while sitting in the kitchen at the counter, all of a sudden he just fell straight to the ground, onto his bottom, without hitting his head, loss of consciousness, and was able to get back up immediately.  Denies any associated symptoms at the time of events, including dizziness, lightheadedness, palpitations, chest pain, shortness of breath, neck pain, jaw pain, arm pain, abdominal pain, nausea, vomiting, fever, or chills.  Denies any recent medication changes, recent dietary changes, recent sickness, sick exposure, or recent travel, foreign or domestic.    Of note, patient reports he does have occasional palpitation, feeling like his heart is beating really hard and fast, lasting several seconds, then subsiding.    Patient reports that he does use a wheeled walker to help with ambulation.   mL, IntraVENous, 2 times per day  sodium chloride flush 0.9 % injection 5-40 mL, 5-40 mL, IntraVENous, PRN  0.9 % sodium chloride infusion, , IntraVENous, PRN  potassium chloride (KLOR-CON M) extended release tablet 40 mEq, 40 mEq, Oral, PRN **OR** potassium bicarb-citric acid (EFFER-K) effervescent tablet 40 mEq, 40 mEq, Oral, PRN **OR** potassium chloride 10 mEq/100 mL IVPB (Peripheral Line), 10 mEq, IntraVENous, PRN  magnesium sulfate 2000 mg in 50 mL IVPB premix, 2,000 mg, IntraVENous, PRN  polyethylene glycol (GLYCOLAX) packet 17 g, 17 g, Oral, Daily PRN  acetaminophen (TYLENOL) tablet 650 mg, 650 mg, Oral, Q6H PRN **OR** acetaminophen (TYLENOL) suppository 650 mg, 650 mg, Rectal, Q6H PRN    Allergies:  Patient reports NKDA    Social History:    Tobacco: Lifelong nonsmoker  ETOH: Denies  Illicit drugs: Denies  Activity: Patient reports that he does use a wheeled walker to help with ambulation.  Lives in a trilevel house, with his wife, and generally has no troubles going up and down stairs.     Code Status: DNR-CCA    Family History: Non contributory DT age  No family history on file.    REVIEW OF SYSTEMS:   See HPI    PHYSICAL EXAM:   BP (!) 102/56   Pulse 72   Temp 97.9 °F (36.6 °C) (Axillary)   Resp 18   Ht 1.702 m (5' 7\")   Wt 76.1 kg (167 lb 12.3 oz)   SpO2 95%   BMI 26.28 kg/m²     CONST:  Well developed, well nourished, elderly,  male, who appears of stated age. Awake, alert and cooperative. No apparent distress.   HEENT:   Head- Normocephalic, atraumatic   Eyes- Conjunctivae pink, anicteric  Throat- Oral mucosa pink and moist  Neck-  No stridor, trachea midline, no jugular venous distention. No carotid bruit.    CHEST: Chest symmetrical and non-tender to palpation. No accessory muscle use or intercostal retractions  RESPIRATORY: Lung sounds - clear throughout fields   CARDIOVASCULAR:     Heart Ausculation- Irregular rhythm with controled rate, no murmur. No s3, s4 or rub   PV: 2-3+

## 2025-03-26 NOTE — H&P
3/26/2025  EXAMINATION: CT OF THE ABDOMEN AND PELVIS WITH CONTRAST 3/26/2025 1:18 pm TECHNIQUE: CT of the abdomen and pelvis was performed with the administration of intravenous contrast. Multiplanar reformatted images are provided for review. Automated exposure control, iterative reconstruction, and/or weight based adjustment of the mA/kV was utilized to reduce the radiation dose to as low as reasonably achievable. COMPARISON: None. HISTORY: ORDERING SYSTEM PROVIDED HISTORY: fall TECHNOLOGIST PROVIDED HISTORY: Additional Contrast?->None Reason for exam:->fall Decision Support Exception - unselect if not a suspected or confirmed emergency medical condition->Emergency Medical Condition (MA) FINDINGS: Lower Chest: Filling defects identified in the lower lobe pulmonary arteries bilaterally to suggest bilateral pulmonary emboli.  There are multiple nodular densities identified throughout the lung fields to suggest pulmonary metastatic disease there is a small left pleural effusion.  Multiple soft tissue nodules identified scattered throughout the chest to suggest diffuse metastatic disease.  Adenopathy identified in the mediastinum.  There are pericardial lymph nodes identified scattered throughout the pericardial fat. Organs: Low-attenuation focus seen within the liver to suggest small cyst. There is low-attenuation along the ligamentum fissure to suggest an air focal fatty infiltration.  No stones in the gallbladder.  The pancreas is homogeneous with diffuse fatty infiltration.  The spleen is unremarkable. There is enlargement and nodularity seen of the adrenal glands bilaterally suggesting bilateral renal gland nodule suggesting metastatic disease.  The left adrenal gland measures 6.1 x 5.0 cm in the right adrenal gland measures 4.5 x 3.9 cm.  There is symmetric enhancement of the renal parenchyma.  No stones or distension seen in the renal collecting system. GI/Bowel: The stomach is decompressed.  Small bowel is  no acute intracranial hemorrhage, mass effect or midline shift.  No abnormal extra-axial fluid collection.  The gray-white differentiation is maintained without evidence of an acute infarct.  There is no evidence of hydrocephalus. ORBITS: The visualized portion of the orbits demonstrate no acute abnormality. SINUSES: The visualized paranasal sinuses and mastoid air cells demonstrate no acute abnormality.  Mucosal thickening involving the right maxillary sinus to suggest a chronic sinusitis. SOFT TISSUES/SKULL:  No acute abnormality of the visualized skull or soft tissues.     Atrophy and chronic changes seen within the brain with no acute intracranial abnormality.       EKG:     ASSESSMENT:      Principal Problem:    Pulmonary embolism, bilateral (HCC)  Active Problems:    Lactic acidosis    Fall at home, initial encounter    Atrial fibrillation and flutter (HCC)    Metastatic melanoma (HCC)  Resolved Problems:    * No resolved hospital problems. *      PLAN:    Bilateral pulmonary emboli-seen on CTAP.  Heparin drip initiated in ED, continue.  Monitor PTT per protocol. Will order echo and US bilateral lower extremities.   Lactic acidosis-5.0.  1L NSS bolus given in ED.  Repeat lactic in process.  Trend until WNL. UA pending.   Fall at home, initial encounter-CK WNL.  PT/OT to eval.  SW consult for discharge planning.  Atrial fibrillation/Atrial flutter- no known history per patient. Chads-vasc score 3. Consult to cardiology, appreciate their input. Currently on heparin drip for bilateral pulmonary embolism. Monitor on telemetry.   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 Originally diagnosed in May 2022. Received checkmate 511 protocol as well as CVT regimen. Last treatment Temodar 3/24/2023.  No further plans for treatment. Patient wishes to establish with Palliative care. Will consult. Appreciate their input.     Code Status: DNR-CCA  DVT prophylaxis:

## 2025-03-26 NOTE — PROGRESS NOTES
4 Eyes Skin Assessment     NAME:  Shorty Boswell  YOB: 1932  MEDICAL RECORD NUMBER:  [id number]    The patient is being assessed for  Admission    I agree that at least one RN has performed a thorough Head to Toe Skin Assessment on the patient. ALL assessment sites listed below have been assessed.      Areas assessed by both nurses:    Head, Face, Ears, Shoulders, Back, Chest, Arms, Elbows, Hands, Sacrum. Buttock, Coccyx, Ischium, and Legs. Feet and Heels        Does the Patient have a Wound? No noted wound(s)       Louis Prevention initiated by RN: No  Wound Care Orders initiated by RN: No    Pressure Injury (Stage 3,4, Unstageable, DTI, NWPT, and Complex wounds) if present, place Wound referral order by RN under : No    New Ostomies, if present place, Ostomy referral order under : No     Nurse 1 eSignature: Electronically signed by Kala Harrington RN on 3/26/25 at 5:22 PM EDT    **SHARE this note so that the co-signing nurse can place an eSignature**    Nurse 2 eSignature: Electronically signed by Claudette Beltran RN on 3/26/25 at 5:33 PM EDT

## 2025-03-26 NOTE — PROGRESS NOTES
New consult sent to Palliative Medicine and [facility] Cardiology via Em[employer] message.

## 2025-03-26 NOTE — ED PROVIDER NOTES
07/10/2012    retrograde pyelograms urethral dilatation       CURRENT MEDICATIONS:      Previous Medications    No medications on file       ALLERGIES:   Patient has no known allergies.    FAMILY HISTORY:    No family history on file.     SOCIAL HISTORY:       Social History     Tobacco Use    Smoking status: Never    Smokeless tobacco: Never   Vaping Use    Vaping status: Never Used   Substance Use Topics    Alcohol use: No    Drug use: No       SCREENINGS:        April Coma Scale  Eye Opening: Spontaneous  Best Verbal Response: Oriented  Best Motor Response: Obeys commands  April Coma Scale Score: 15                CIWA Assessment  BP: 104/60  Pulse: 93           PHYSICAL EXAM:     ED Triage Vitals [03/26/25 0900]   BP Systolic BP Percentile Diastolic BP Percentile Temp Temp Source Pulse Respirations SpO2   -- -- -- 97.5 °F (36.4 °C) Oral 93 20 --      Height Weight             Constitutional/General: Alert and oriented x3  Head: Normocephalic and atraumatic  Eyes: PERRL, EOMI, sclera non icteric  Mouth:  Oropharynx clear, handling secretions  Neck: Supple, full ROM, no stridor, no meningeal signs  Back: No midline cervical, thoracic, lumbar spine tenderness  Respiratory: Lungs clear to auscultation bilaterally, no wheezes, rales, or rhonchi. Not in respiratory distress  Cardiovascular:  Regular rate. Regular rhythm. No murmurs, no gallops, no rubs. Equal extremity pulses.   GI:  Abdomen Soft, Non tender, Non distended.  No rebound, guarding, or rigidity. No pulsatile masses.  Musculoskeletal: Moves all extremities x 4. Warm and well perfused, no clubbing, no cyanosis, no edema. Capillary refill <3 seconds. Compartments are soft and compressible. 2+ distal pulses.  No calf tenderness or palpable cords.  Integument: skin warm and dry.   Neurologic: GCS 15, no focal deficits, symmetric strength 5/5 in the upper and lower extremities bilaterally  Psychiatric: Normal Affect            DIAGNOSTIC RESULTS:     LABS:  degrees         ED Course as of 03/26/25 1430   Wed Mar 26, 2025   1017 EKG Interpretation  Interpreted by emergency department physician, Omar Loera MD    3/26/25  Time: 1010    Rhythm: afib vs aflutter  Rate: normal  Axis: left  Conduction: right bundle branch block (complete)  ST Segments: no acute change  T Waves: no acute change    Clinical Impression: afib v aflutter  Comparison to Old EKG Changes from prior EKG     [CD]      ED Course User Index  [CD] Omar Loera MD         CONSULTS:   Internal Medicine      PROCEDURES:   Unless otherwise noted below, none       CRITICAL CARE TIME:      CRITICAL CARE:  Please note that the withdrawal or failure to initiate urgent interventions for this patient would likely result in a life threatening deterioration or permanent disability.      Accordingly this patient received 30 minutes of critical care time, including coordination of care, and direct bedside care and excluding separately billable procedures.    IOmar MD, am the primary provider of record    FINAL IMPRESSION:      1. Acute pulmonary embolism without acute cor pulmonale, unspecified pulmonary embolism type (HCC)    2. Fall, initial encounter    3. Contusion of sacrum, initial encounter          DISPOSITION/PLAN:     DISPOSITION Decision To Admit 03/26/2025 01:42:57 PM      PATIENT REFERRED TO:  No follow-up provider specified.    DISCHARGE MEDICATIONS:  New Prescriptions    No medications on file       DISCONTINUED MEDICATIONS:  Discontinued Medications    ATORVASTATIN (LIPITOR) 10 MG TABLET    Take 1 tablet by mouth every other day              (Please note that portions of this note were completed with a voice recognition program.  Efforts were made to edit the dictations but occasionally words are mis-transcribed.)    Omar Loera MD (electronically signed)          Omar Loera MD  03/26/25 1431

## 2025-03-27 ENCOUNTER — APPOINTMENT (OUTPATIENT)
Age: 89
End: 2025-03-27
Payer: MEDICARE

## 2025-03-27 ENCOUNTER — APPOINTMENT (OUTPATIENT)
Dept: ULTRASOUND IMAGING | Age: 89
End: 2025-03-27
Payer: MEDICARE

## 2025-03-27 PROBLEM — I48.92 ATRIAL FLUTTER, CHRONIC (HCC): Status: ACTIVE | Noted: 2025-03-27

## 2025-03-27 PROBLEM — I82.411 ACUTE DEEP VEIN THROMBOSIS (DVT) OF FEMORAL VEIN OF RIGHT LOWER EXTREMITY: Status: ACTIVE | Noted: 2025-03-27

## 2025-03-27 PROBLEM — I45.10 RBBB: Status: ACTIVE | Noted: 2025-03-27

## 2025-03-27 PROBLEM — I10 PRIMARY HYPERTENSION: Status: ACTIVE | Noted: 2025-03-27

## 2025-03-27 LAB
ANION GAP SERPL CALCULATED.3IONS-SCNC: 18 MMOL/L (ref 7–16)
BASOPHILS # BLD: 0.05 K/UL (ref 0–0.2)
BASOPHILS NFR BLD: 1 % (ref 0–2)
BUN SERPL-MCNC: 12 MG/DL (ref 6–23)
CALCIUM SERPL-MCNC: 8.5 MG/DL (ref 8.6–10.2)
CHLORIDE SERPL-SCNC: 95 MMOL/L (ref 98–107)
CO2 SERPL-SCNC: 20 MMOL/L (ref 22–29)
CREAT SERPL-MCNC: 0.8 MG/DL (ref 0.7–1.2)
ECHO AO ANNULUS INDEX: 1.86 CM/M2
ECHO AO ASC DIAM: 3.1 CM
ECHO AO ASCENDING AORTA INDEX: 1.65 CM/M2
ECHO AV ANNULUS DIAM: 3.5 CM
ECHO AV AREA PEAK VELOCITY: 3.4 CM2
ECHO AV AREA VTI: 3.4 CM2
ECHO AV AREA/BSA PEAK VELOCITY: 1.8 CM2/M2
ECHO AV AREA/BSA VTI: 1.8 CM2/M2
ECHO AV CUSP MM: 2 CM
ECHO AV MEAN GRADIENT: 3 MMHG
ECHO AV MEAN VELOCITY: 0.8 M/S
ECHO AV PEAK GRADIENT: 5 MMHG
ECHO AV PEAK VELOCITY: 1.1 M/S
ECHO AV VELOCITY RATIO: 1
ECHO AV VTI: 17.5 CM
ECHO BSA: 1.85 M2
ECHO LA DIAMETER INDEX: 1.65 CM/M2
ECHO LA DIAMETER: 3.1 CM
ECHO LA VOL A-L A2C: 33 ML (ref 18–58)
ECHO LA VOL A-L A4C: 37 ML (ref 18–58)
ECHO LA VOL MOD A2C: 32 ML (ref 18–58)
ECHO LA VOL MOD A4C: 34 ML (ref 18–58)
ECHO LA VOLUME AREA LENGTH: 37 ML
ECHO LA VOLUME INDEX A-L A2C: 18 ML/M2 (ref 16–34)
ECHO LA VOLUME INDEX A-L A4C: 20 ML/M2 (ref 16–34)
ECHO LA VOLUME INDEX AREA LENGTH: 20 ML/M2 (ref 16–34)
ECHO LA VOLUME INDEX MOD A2C: 17 ML/M2 (ref 16–34)
ECHO LA VOLUME INDEX MOD A4C: 18 ML/M2 (ref 16–34)
ECHO LV EF PHYSICIAN: 60 %
ECHO LV FRACTIONAL SHORTENING: 31 % (ref 28–44)
ECHO LV INTERNAL DIMENSION DIASTOLE INDEX: 1.86 CM/M2
ECHO LV INTERNAL DIMENSION DIASTOLIC: 3.5 CM (ref 4.2–5.9)
ECHO LV INTERNAL DIMENSION SYSTOLIC INDEX: 1.28 CM/M2
ECHO LV INTERNAL DIMENSION SYSTOLIC: 2.4 CM
ECHO LV IVSD: 1.6 CM (ref 0.6–1)
ECHO LV IVSS: 1.9 CM
ECHO LV MASS 2D: 144.6 G (ref 88–224)
ECHO LV MASS INDEX 2D: 76.9 G/M2 (ref 49–115)
ECHO LV POSTERIOR WALL DIASTOLIC: 0.9 CM (ref 0.6–1)
ECHO LV POSTERIOR WALL SYSTOLIC: 1.5 CM
ECHO LV RELATIVE WALL THICKNESS RATIO: 0.51
ECHO LVOT AREA: 3.5 CM2
ECHO LVOT AV VTI INDEX: 1.01
ECHO LVOT DIAM: 2.1 CM
ECHO LVOT MEAN GRADIENT: 3 MMHG
ECHO LVOT PEAK GRADIENT: 5 MMHG
ECHO LVOT PEAK VELOCITY: 1.1 M/S
ECHO LVOT STROKE VOLUME INDEX: 32.4 ML/M2
ECHO LVOT SV: 60.9 ML
ECHO LVOT VTI: 17.6 CM
ECHO MV AREA PHT: 9.5 CM2
ECHO MV AREA VTI: 4.8 CM2
ECHO MV LVOT VTI INDEX: 0.72
ECHO MV MAX VELOCITY: 1 M/S
ECHO MV MEAN GRADIENT: 2 MMHG
ECHO MV MEAN VELOCITY: 0.6 M/S
ECHO MV PEAK GRADIENT: 4 MMHG
ECHO MV PRESSURE HALF TIME (PHT): 23.2 MS
ECHO MV VTI: 12.6 CM
ECHO PV MAX VELOCITY: 1 M/S
ECHO PV MEAN GRADIENT: 2 MMHG
ECHO PV MEAN VELOCITY: 0.6 M/S
ECHO PV PEAK GRADIENT: 4 MMHG
ECHO PV VTI: 12 CM
ECHO RV BASAL DIMENSION: 4.6 CM
ECHO RV FREE WALL PEAK S': 13 CM/S
ECHO RV INTERNAL DIMENSION: 2.9 CM
ECHO RV LONGITUDINAL DIMENSION: 8.1 CM
ECHO RV MID DIMENSION: 3.5 CM
ECHO RV TAPSE: 2.1 CM (ref 1.7–?)
EOSINOPHIL # BLD: 0.15 K/UL (ref 0.05–0.5)
EOSINOPHILS RELATIVE PERCENT: 3 % (ref 0–6)
ERYTHROCYTE [DISTWIDTH] IN BLOOD BY AUTOMATED COUNT: 13.5 % (ref 11.5–15)
GFR, ESTIMATED: 82 ML/MIN/1.73M2
GLUCOSE SERPL-MCNC: 98 MG/DL (ref 74–99)
HCT VFR BLD AUTO: 33.7 % (ref 37–54)
HGB BLD-MCNC: 11 G/DL (ref 12.5–16.5)
IMM GRANULOCYTES # BLD AUTO: 0.07 K/UL (ref 0–0.58)
IMM GRANULOCYTES NFR BLD: 1 % (ref 0–5)
LACTATE BLDV-SCNC: 5.6 MMOL/L (ref 0.5–2.2)
LYMPHOCYTES NFR BLD: 0.82 K/UL (ref 1.5–4)
LYMPHOCYTES RELATIVE PERCENT: 14 % (ref 20–42)
MCH RBC QN AUTO: 30.8 PG (ref 26–35)
MCHC RBC AUTO-ENTMCNC: 32.6 G/DL (ref 32–34.5)
MCV RBC AUTO: 94.4 FL (ref 80–99.9)
MONOCYTES NFR BLD: 0.36 K/UL (ref 0.1–0.95)
MONOCYTES NFR BLD: 6 % (ref 2–12)
NEUTROPHILS NFR BLD: 76 % (ref 43–80)
NEUTS SEG NFR BLD: 4.53 K/UL (ref 1.8–7.3)
PARTIAL THROMBOPLASTIN TIME: 109.5 SEC (ref 24.5–35.1)
PARTIAL THROMBOPLASTIN TIME: 49 SEC (ref 24.5–35.1)
PARTIAL THROMBOPLASTIN TIME: 49.5 SEC (ref 24.5–35.1)
PLATELET # BLD AUTO: 227 K/UL (ref 130–450)
PMV BLD AUTO: 8.8 FL (ref 7–12)
POTASSIUM SERPL-SCNC: 3.8 MMOL/L (ref 3.5–5)
RBC # BLD AUTO: 3.57 M/UL (ref 3.8–5.8)
SODIUM SERPL-SCNC: 133 MMOL/L (ref 132–146)
WBC OTHER # BLD: 6 K/UL (ref 4.5–11.5)

## 2025-03-27 PROCEDURE — C8929 TTE W OR WO FOL WCON,DOPPLER: HCPCS

## 2025-03-27 PROCEDURE — 85730 THROMBOPLASTIN TIME PARTIAL: CPT

## 2025-03-27 PROCEDURE — 93306 TTE W/DOPPLER COMPLETE: CPT | Performed by: INTERNAL MEDICINE

## 2025-03-27 PROCEDURE — 80048 BASIC METABOLIC PNL TOTAL CA: CPT

## 2025-03-27 PROCEDURE — 2580000003 HC RX 258: Performed by: STUDENT IN AN ORGANIZED HEALTH CARE EDUCATION/TRAINING PROGRAM

## 2025-03-27 PROCEDURE — 83605 ASSAY OF LACTIC ACID: CPT

## 2025-03-27 PROCEDURE — 6360000002 HC RX W HCPCS: Performed by: EMERGENCY MEDICINE

## 2025-03-27 PROCEDURE — 99222 1ST HOSP IP/OBS MODERATE 55: CPT

## 2025-03-27 PROCEDURE — 2500000003 HC RX 250 WO HCPCS

## 2025-03-27 PROCEDURE — 97165 OT EVAL LOW COMPLEX 30 MIN: CPT

## 2025-03-27 PROCEDURE — 93005 ELECTROCARDIOGRAM TRACING: CPT | Performed by: CLINICAL NURSE SPECIALIST

## 2025-03-27 PROCEDURE — 2060000000 HC ICU INTERMEDIATE R&B

## 2025-03-27 PROCEDURE — 6360000004 HC RX CONTRAST MEDICATION

## 2025-03-27 PROCEDURE — 93970 EXTREMITY STUDY: CPT

## 2025-03-27 PROCEDURE — APPSS180 APP SPLIT SHARED TIME > 60 MINUTES: Performed by: NURSE PRACTITIONER

## 2025-03-27 PROCEDURE — 99232 SBSQ HOSP IP/OBS MODERATE 35: CPT | Performed by: STUDENT IN AN ORGANIZED HEALTH CARE EDUCATION/TRAINING PROGRAM

## 2025-03-27 PROCEDURE — 99223 1ST HOSP IP/OBS HIGH 75: CPT | Performed by: INTERNAL MEDICINE

## 2025-03-27 PROCEDURE — 97535 SELF CARE MNGMENT TRAINING: CPT

## 2025-03-27 PROCEDURE — 85025 COMPLETE CBC W/AUTO DIFF WBC: CPT

## 2025-03-27 RX ORDER — SODIUM CHLORIDE 9 MG/ML
INJECTION, SOLUTION INTRAVENOUS CONTINUOUS
Status: DISCONTINUED | OUTPATIENT
Start: 2025-03-27 | End: 2025-03-29

## 2025-03-27 RX ORDER — METOPROLOL TARTRATE 25 MG/1
25 TABLET, FILM COATED ORAL DAILY
Status: DISCONTINUED | OUTPATIENT
Start: 2025-03-27 | End: 2025-03-28

## 2025-03-27 RX ADMIN — SODIUM CHLORIDE: 0.9 INJECTION, SOLUTION INTRAVENOUS at 13:45

## 2025-03-27 RX ADMIN — HEPARIN SODIUM 20 UNITS/KG/HR: 10000 INJECTION, SOLUTION INTRAVENOUS at 04:07

## 2025-03-27 RX ADMIN — SODIUM CHLORIDE, PRESERVATIVE FREE 10 ML: 5 INJECTION INTRAVENOUS at 09:44

## 2025-03-27 RX ADMIN — HEPARIN SODIUM 2900 UNITS: 1000 INJECTION INTRAVENOUS; SUBCUTANEOUS at 23:01

## 2025-03-27 RX ADMIN — HEPARIN SODIUM 2900 UNITS: 1000 INJECTION INTRAVENOUS; SUBCUTANEOUS at 04:04

## 2025-03-27 RX ADMIN — SULFUR HEXAFLUORIDE 2 ML: 60.7; .19; .19 INJECTION, POWDER, LYOPHILIZED, FOR SUSPENSION INTRAVENOUS; INTRAVESICAL at 09:44

## 2025-03-27 ASSESSMENT — PAIN SCALES - GENERAL: PAINLEVEL_OUTOF10: 0

## 2025-03-27 NOTE — CARE COORDINATION
Introduced my self and provided explanation of CM role to patient. Admitted for acute PE's, lactic acidosis, fall at home. Hx of metastatic melanoma. Cardiology and Palliative consults pending, await plan. He voices he resides in a tri-level home with his spouse and completes his adl's with independence. Patient ambulates with a ww. Patient is established with Dr. Appiah.  Patient is requesting to go to [facility], pending therapy evals. Referral made to dany Agudelo, she is following. Will continue to follow.  Susana BOWENS, RN  Case Management     Addendum: Cm notified by Palliative patient would like to have Palliative Care in the home setting after discharge from SNF, CM notified dany Agudelo to have facility CM arrange.   Susana BOWENS, RN  Case Management

## 2025-03-27 NOTE — PROGRESS NOTES
Spiritual Health History and Assessment/Progress Note  [facility]     Encounter, Rituals, Rites and Sacraments,  ,  ,      Name: Shorty Boswell MRN: [id number]    Age: 89 y.o.     Sex: male   Language: English   Taoist: Amish   Acute pulmonary embolism without acute cor pulmonale (HCC)     Date: 3/27/2025                           Spiritual Assessment began in [address] MED SURG        Referral/Consult From: Rounding   Encounter Overview/Reason:  Encounter, Rituals, Rites and Sacraments  Service Provided For: Patient    Chasity, Belief, Meaning:   Patient is connected with a chasity tradition or spiritual practice  Family/Friends No family/friends present      Importance and Influence:  Patient has no beliefs influential to healthcare decision-making identified during this visit  Family/Friends No family/friends present    Community:  Patient feels well-supported. Support system includes: Extended family  Family/Friends No family/friends present    Assessment and Plan of Care:     Patient Interventions include: Facilitated expression of thoughts and feelings, Affirmed coping skills/support systems, and Provided sacramental/Jewish ritual  Family/Friends Interventions include: No family/friends present    Patient Plan of Care: Spiritual Care available upon further referral  Family/Friends Plan of Care: Spiritual Care available upon further referral    Electronically signed by Chaplain Kathrine on 3/27/2025 at 5:06 PM

## 2025-03-27 NOTE — ACP (ADVANCE CARE PLANNING)
Advance Care Planning   Healthcare Decision Maker:    Primary Decision Maker: Ольга Benitez - Spouse - [phone]    Secondary Decision Maker: Abiel Aleman \"Patrick\" - Other - [phone]    Click here to complete Healthcare Decision Makers including selection of the Healthcare Decision Maker Relationship (ie \"Primary\").  Today we documented Decision Maker(s) consistent with Legal Next of Kin hierarchy.

## 2025-03-27 NOTE — PROGRESS NOTES
Occupational Therapy  OCCUPATIONAL THERAPY INITIAL EVALUATION    [facility]   [address] [place], [place]         Date:3/27/2025                                                  Patient Name: Shorty Boswell    MRN: [id number]    : 1932    Room: [address] [place], [place] [address]      Evaluating OT: Kira Robles OTR/LANDON [id number]       Referring Provider:Karolina Pacheco APRN - CNP     Specific Provider Orders/Date: OT eval and treat 3/26/25      Diagnosis: Acute Pulmonary Embolism and S/P Fall     Surgery: none      Pertinent Medical History: Prostate Cancer         Precautions:  Fall Risk,   Assessment of current deficits    [x] Functional mobility  [x]ADLs  [x] Strength               []Cognition    [x] Functional transfers   [x] IADLs         [x] Safety Awareness   [x]Endurance    [] Fine Coordination              [x] Balance      [] Vision/perception   []Sensation     []Gross Motor Coordination  [] ROM  [] Delirium                   [] Motor Control     OT PLAN OF CARE   OT POC based on physician orders, patient diagnosis and results of clinical assessment    Frequency/Duration 2-5  days/wk for 2 -4 weeks PRN   Specific OT Treatment Interventions to include:   * Instruction/training on adapted ADL techniques and AE recommendations to increase functional independence within precautions       * Training on energy conservation strategies, correct breathing pattern and techniques to improve independence/tolerance for self-care routine  * Functional transfer/mobility training/DME recommendations for increased independence, safety, and fall prevention  * Patient/Family education to increase follow through with safety techniques and functional independence  * Recommendation of environmental modifications for increased safety with functional transfers/mobility and ADLs  * Therapeutic exercise to improve motor endurance, ROM, and functional strength for ADLs/functional

## 2025-03-27 NOTE — PROGRESS NOTES
[facility] Hospitalist Progress Note    Admitting Date and Time: 3/26/2025  8:46 AM  Admit Dx: Pulmonary embolism, bilateral (HCC) [I26.99]  Contusion of sacrum, initial encounter [S30.0XXA]  Fall, initial encounter [W19.XXXA]  Acute pulmonary embolism without acute cor pulmonale, unspecified pulmonary embolism type (HCC) [I26.99]    Subjective:  Patient is being followed for Pulmonary embolism, bilateral (HCC) [I26.99]  Contusion of sacrum, initial encounter [S30.0XXA]  Fall, initial encounter [W19.XXXA]  Acute pulmonary embolism without acute cor pulmonale, unspecified pulmonary embolism type (HCC) [I26.99]   Pt was seen and examined today. Denies any new issues    ROS: denies fever, chills, cp, sob, n/v, HA unless stated above.     sodium chloride flush  5-40 mL IntraVENous 2 times per day     heparin (porcine), 80 Units/kg, PRN  heparin (porcine), 40 Units/kg, PRN  sodium chloride flush, 5-40 mL, PRN  sodium chloride, , PRN  potassium chloride, 40 mEq, PRN   Or  potassium alternative oral replacement, 40 mEq, PRN   Or  potassium chloride, 10 mEq, PRN  magnesium sulfate, 2,000 mg, PRN  polyethylene glycol, 17 g, Daily PRN  acetaminophen, 650 mg, Q6H PRN   Or  acetaminophen, 650 mg, Q6H PRN         Objective:    BP (!) 102/56   Pulse 72   Temp 97.9 °F (36.6 °C) (Axillary)   Resp 18   Ht 1.702 m (5' 7\")   Wt 76.1 kg (167 lb 12.3 oz)   SpO2 95%   BMI 26.28 kg/m²     General Appearance: alert and oriented to person, place and time and in no acute distress  Skin: warm and dry. LUE mass with associated black rash  Head: normocephalic and atraumatic  Pulmonary/Chest: clear to auscultation bilaterally- no wheezes, rales or rhonchi, normal air movement, no respiratory distress  Cardiovascular: normal rate, normal S1 and S2  Abdomen: soft, non-tender, non-distended, normal bowel sounds  Extremities: no cyanosis, no clubbing and 2+ edema    Recent Labs     03/26/25  0953 03/27/25  0306    133   K 3.7

## 2025-03-27 NOTE — DISCHARGE INSTR - COC
Continuity of Care Form    Patient Name: Shorty Boswell   :  1932  MRN:  [id number]    Admit date:  3/26/2025  Discharge date:  3/29/25    Code Status Order: Limited   Advance Directives:     Admitting Physician:  Chanelle Patel MD  PCP: Daniel Appiah DO    Discharging Nurse: Renata ARCHER  Discharging Hospital Unit/Room#: 0605/0605-A  Discharging Unit Phone Number: [phone]    Emergency Contact:   Extended Emergency Contact Information  Primary Emergency Contact: Ольга Benitez  Address: [address] [place], [place] [address] [address]  Home Phone: [phone]  Mobile Phone: [phone]  Relation: Spouse  Hard of hearing? Yes  Preferred language: English   needed? No  Secondary Emergency Contact: Rebecca English  Mobile Phone: [phone]  Relation: Child    Past Surgical History:  Past Surgical History:   Procedure Laterality Date    CYSTOSCOPY      SEED IMPLANTATION    CYSTOSCOPY  07/10/2012    retrograde pyelograms urethral dilatation       Immunization History:   Immunization History   Administered Date(s) Administered    COVID-19, PFIZER PURPLE top, DILUTE for use, (age 12 y+), 30mcg/0.3mL 12/10/2021, 2022    Influenza Whole 11/15/2007    Influenza, FLUAD, (age 65 y+), IM, Quadv, 0.5mL 10/26/2020, 2021, 10/14/2022    Influenza, FLUAD, (age 65 y+), IM, Trivalent PF, 0.5mL 10/22/2018, 2019    Pneumococcal Conjugate 7-valent (Prevnar7) 1932    Pneumococcal, PPSV23, PNEUMOVAX 23, (age 2y+), SC/IM, 0.5mL 2021    TDaP, ADACEL (age 10y-64y), BOOSTRIX (age 10y+), IM, 0.5mL 2021       Active Problems:  Patient Active Problem List   Diagnosis Code    Syncope and collapse R55    AV block, Mobitz II I44.1    Other hyperlipidemia E78.49    Head injury S09.90XA    Acute pulmonary embolism without acute cor pulmonale (HCC) I26.99    Lactic acidosis E87.20    Fall at home, initial encounter W19.XXXA, Y92.009    Atrial

## 2025-03-27 NOTE — CONSULTS
Palliative Care Department  [phone]  Palliative Care Initial Consult  Provider Violet Sparrow, MAVIS Blevins CNP     Shorty Boswell  [id number]  Hospital Day: 2  Date of Initial Consult: 3/26/25  Referring Provider: Karolina Pacheco APRN - CNP   Palliative Medicine was consulted for assistance with: Goals of Care    HPI:   Shorty Boswell is a 89 y.o. with a medical history of HLD, heart block, metastatic melanoma refractory to immunotherapy with mets to brain, lymph, left arm (last treatment 2023 @ ),  who was admitted on 3/26/2025 from home with a CHIEF COMPLAINT of fatigue and general weakness, fall with several hour downtime.  Patient reports he fell going up the steps at 1 in the morning but felt too weak to get up.  CT head and cervical spine and Xray pelvis all negative for acute findings.  CT A/P shows bilateral pulmonary emboli, diffuse widespread metastatic disease to chest, abdomen, pelvis, subcu tissues, adrenal gland nodules bilaterally, small left pleural effusion.  Patient was following with  for melanoma, but opted to stop further treatments and May 2024 and focus on quality of life, he did not feel ready for hospice at that time but planned on involving them when he felt the time was ready. Lactic acid 5.0.  He was started on heparin drip for PE.  Palliative medicine consulted for further assistance with goals of care.    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  Capacity is time limited and situation/question specific  During encounter [place] was surrogate medical decision-maker  Outcome of goals of care meeting:   Change code status to limited (DNR-CCA/DNI)  Continue  regarding goals of care, diagnosis and prognosis, and see above.    Thank you for allowing Palliative Medicine to participate in the care of Shorty Boswell.

## 2025-03-27 NOTE — PROGRESS NOTES
Spiritual Health History and Assessment/Progress Note  [facility]    Initial Encounter, Attempted Encounter,  ,  ,      Name: Shorty Boswell MRN: [id number]    Age: 89 y.o.     Sex: male   Language: English   Sabianist: Sikhism   Acute pulmonary embolism without acute cor pulmonale (HCC)     Date: 3/27/2025                           [facility]            Encounter Overview/Reason: Initial Encounter, Attempted Encounter  Service Provided For: Patient, Patient not available    Chasity, Belief, Meaning:   Patient is connected with a chasity tradition or spiritual practice  Family/Friends No family/friends present      Importance and Influence:  Patient unable to assess at this time  Family/Friends No family/friends present    Community:  Patient is connected with a spiritual community and feels well-supported. Support system includes: Spouse/Partner, Children, and Friends  Family/Friends No family/friends present    Assessment and Plan of Care:     Patient Interventions include: Other: Staff addressing the patient, prayer silently offered for the patient at the time.  Family/Friends Interventions include: No family/friends present    Patient Plan of Care: Spiritual Care available upon further referral  Family/Friends Plan of Care: No family/friends present    Electronically signed by Chaplain Mitesh on 3/27/2025 at 3:19 PM

## 2025-03-27 NOTE — PLAN OF CARE
Maintains optimal cardiac output and hemodynamic stability  Outcome: Progressing  Goal: Absence of cardiac dysrhythmias or at baseline  Outcome: Progressing     Problem: Musculoskeletal - Adult  Goal: Return mobility to safest level of function  Outcome: Progressing     Problem: Metabolic/Fluid and Electrolytes - Adult  Goal: Electrolytes maintained within normal limits  Outcome: Progressing     Problem: Hematologic - Adult  Goal: Maintains hematologic stability  Outcome: Progressing     Problem: Discharge Planning  Goal: Discharge to home or other facility with appropriate resources  Outcome: Progressing  Flowsheets  Taken 3/26/2025 2055 by Anusha Hernandez RN  Discharge to home or other facility with appropriate resources:   Identify barriers to discharge with patient and caregiver   Arrange for needed discharge resources and transportation as appropriate  Taken 3/26/2025 1630 by Kala Harrington RN  Discharge to home or other facility with appropriate resources:   Identify barriers to discharge with patient and caregiver   Arrange for needed discharge resources and transportation as appropriate   Identify discharge learning needs (meds, wound care, etc)   Arrange for interpreters to assist at discharge as needed   Refer to discharge planning if patient needs post-hospital services based on physician order or complex needs related to functional status, cognitive ability or social support system     Problem: Pain  Goal: Verbalizes/displays adequate comfort level or baseline comfort level  Outcome: Progressing     Problem: Safety - Adult  Goal: Free from fall injury  Outcome: Progressing  Flowsheets (Taken 3/26/2025 1753 by Kala Harrington, RN)  Free From Fall Injury: Instruct family/caregiver on patient safety     Problem: Skin/Tissue Integrity  Goal: Skin integrity remains intact  Description: 1.  Monitor for areas of redness and/or skin breakdown  2.  Assess vascular access sites hourly  3.  Every 4-6  hours minimum:  Change oxygen saturation probe site  4.  Every 4-6 hours:  If on nasal continuous positive airway pressure, respiratory therapy assess nares and determine need for appliance change or resting period  Outcome: Progressing  Flowsheets (Taken 3/26/2025 2055)  Skin Integrity Remains Intact: Monitor for areas of redness and/or skin breakdown     Problem: ABCDS Injury Assessment  Goal: Absence of physical injury  Outcome: Progressing     Problem: Cardiovascular - Adult  Goal: Maintains optimal cardiac output and hemodynamic stability  Outcome: Progressing     Problem: Cardiovascular - Adult  Goal: Absence of cardiac dysrhythmias or at baseline  Outcome: Progressing     Problem: Musculoskeletal - Adult  Goal: Return mobility to safest level of function  Outcome: Progressing     Problem: Metabolic/Fluid and Electrolytes - Adult  Goal: Electrolytes maintained within normal limits  Outcome: Progressing     Problem: Hematologic - Adult  Goal: Maintains hematologic stability  Outcome: Progressing

## 2025-03-28 LAB
ANION GAP SERPL CALCULATED.3IONS-SCNC: 16 MMOL/L (ref 7–16)
BASOPHILS # BLD: 0.04 K/UL (ref 0–0.2)
BASOPHILS NFR BLD: 1 % (ref 0–2)
BUN SERPL-MCNC: 11 MG/DL (ref 6–23)
CALCIUM SERPL-MCNC: 8.4 MG/DL (ref 8.6–10.2)
CHLORIDE SERPL-SCNC: 97 MMOL/L (ref 98–107)
CO2 SERPL-SCNC: 19 MMOL/L (ref 22–29)
CREAT SERPL-MCNC: 0.7 MG/DL (ref 0.7–1.2)
EKG ATRIAL RATE: 108 BPM
EKG P AXIS: 13 DEGREES
EKG Q-T INTERVAL: 424 MS
EKG QRS DURATION: 118 MS
EKG QTC CALCULATION (BAZETT): 527 MS
EKG R AXIS: -46 DEGREES
EKG T AXIS: 0 DEGREES
EKG VENTRICULAR RATE: 93 BPM
EOSINOPHIL # BLD: 0.18 K/UL (ref 0.05–0.5)
EOSINOPHILS RELATIVE PERCENT: 3 % (ref 0–6)
ERYTHROCYTE [DISTWIDTH] IN BLOOD BY AUTOMATED COUNT: 13.8 % (ref 11.5–15)
GFR, ESTIMATED: 85 ML/MIN/1.73M2
GLUCOSE SERPL-MCNC: 97 MG/DL (ref 74–99)
HCT VFR BLD AUTO: 33.4 % (ref 37–54)
HGB BLD-MCNC: 10.9 G/DL (ref 12.5–16.5)
IMM GRANULOCYTES # BLD AUTO: 0.04 K/UL (ref 0–0.58)
IMM GRANULOCYTES NFR BLD: 1 % (ref 0–5)
LACTATE BLDV-SCNC: 5 MMOL/L (ref 0.5–2.2)
LYMPHOCYTES NFR BLD: 0.71 K/UL (ref 1.5–4)
LYMPHOCYTES RELATIVE PERCENT: 12 % (ref 20–42)
MCH RBC QN AUTO: 31.2 PG (ref 26–35)
MCHC RBC AUTO-ENTMCNC: 32.6 G/DL (ref 32–34.5)
MCV RBC AUTO: 95.7 FL (ref 80–99.9)
MONOCYTES NFR BLD: 0.35 K/UL (ref 0.1–0.95)
MONOCYTES NFR BLD: 6 % (ref 2–12)
NEUTROPHILS NFR BLD: 77 % (ref 43–80)
NEUTS SEG NFR BLD: 4.45 K/UL (ref 1.8–7.3)
PARTIAL THROMBOPLASTIN TIME: 67.6 SEC (ref 24.5–35.1)
PLATELET # BLD AUTO: 248 K/UL (ref 130–450)
PMV BLD AUTO: 8.8 FL (ref 7–12)
POTASSIUM SERPL-SCNC: 3.8 MMOL/L (ref 3.5–5)
RBC # BLD AUTO: 3.49 M/UL (ref 3.8–5.8)
SODIUM SERPL-SCNC: 132 MMOL/L (ref 132–146)
WBC OTHER # BLD: 5.8 K/UL (ref 4.5–11.5)

## 2025-03-28 PROCEDURE — 85730 THROMBOPLASTIN TIME PARTIAL: CPT

## 2025-03-28 PROCEDURE — 36415 COLL VENOUS BLD VENIPUNCTURE: CPT

## 2025-03-28 PROCEDURE — 93010 ELECTROCARDIOGRAM REPORT: CPT | Performed by: INTERNAL MEDICINE

## 2025-03-28 PROCEDURE — 83605 ASSAY OF LACTIC ACID: CPT

## 2025-03-28 PROCEDURE — 2060000000 HC ICU INTERMEDIATE R&B

## 2025-03-28 PROCEDURE — 2580000003 HC RX 258: Performed by: STUDENT IN AN ORGANIZED HEALTH CARE EDUCATION/TRAINING PROGRAM

## 2025-03-28 PROCEDURE — 85025 COMPLETE CBC W/AUTO DIFF WBC: CPT

## 2025-03-28 PROCEDURE — 99232 SBSQ HOSP IP/OBS MODERATE 35: CPT | Performed by: STUDENT IN AN ORGANIZED HEALTH CARE EDUCATION/TRAINING PROGRAM

## 2025-03-28 PROCEDURE — 6370000000 HC RX 637 (ALT 250 FOR IP): Performed by: NURSE PRACTITIONER

## 2025-03-28 PROCEDURE — 6370000000 HC RX 637 (ALT 250 FOR IP): Performed by: STUDENT IN AN ORGANIZED HEALTH CARE EDUCATION/TRAINING PROGRAM

## 2025-03-28 PROCEDURE — 80048 BASIC METABOLIC PNL TOTAL CA: CPT

## 2025-03-28 RX ORDER — METOPROLOL TARTRATE 25 MG/1
25 TABLET, FILM COATED ORAL 2 TIMES DAILY
Status: DISCONTINUED | OUTPATIENT
Start: 2025-03-28 | End: 2025-03-29

## 2025-03-28 RX ADMIN — SODIUM CHLORIDE: 0.9 INJECTION, SOLUTION INTRAVENOUS at 11:11

## 2025-03-28 RX ADMIN — METOPROLOL TARTRATE 25 MG: 25 TABLET, FILM COATED ORAL at 08:37

## 2025-03-28 RX ADMIN — METOPROLOL TARTRATE 25 MG: 25 TABLET, FILM COATED ORAL at 21:25

## 2025-03-28 RX ADMIN — APIXABAN 10 MG: 5 TABLET, FILM COATED ORAL at 21:25

## 2025-03-28 RX ADMIN — Medication 3 MG: at 23:33

## 2025-03-28 NOTE — PROGRESS NOTES
[facility] Hospitalist Progress Note    Admitting Date and Time: 3/26/2025  8:46 AM  Admit Dx: Pulmonary embolism, bilateral (HCC) [I26.99]  Contusion of sacrum, initial encounter [S30.0XXA]  Fall, initial encounter [W19.XXXA]  Acute pulmonary embolism without acute cor pulmonale, unspecified pulmonary embolism type (HCC) [I26.99]    Subjective:  Patient is being followed for Pulmonary embolism, bilateral (HCC) [I26.99]  Contusion of sacrum, initial encounter [S30.0XXA]  Fall, initial encounter [W19.XXXA]  Acute pulmonary embolism without acute cor pulmonale, unspecified pulmonary embolism type (HCC) [I26.99]   Pt was seen and examined today. Denies any new issues    ROS: denies fever, chills, cp, sob, n/v, HA unless stated above.     apixaban  10 mg Oral BID    Followed by    [START ON 4/4/2025] apixaban  5 mg Oral BID    metoprolol tartrate  25 mg Oral BID    sodium chloride flush  5-40 mL IntraVENous 2 times per day     heparin (porcine), 80 Units/kg, PRN  heparin (porcine), 40 Units/kg, PRN  sodium chloride flush, 5-40 mL, PRN  sodium chloride, , PRN  potassium chloride, 40 mEq, PRN   Or  potassium alternative oral replacement, 40 mEq, PRN   Or  potassium chloride, 10 mEq, PRN  magnesium sulfate, 2,000 mg, PRN  polyethylene glycol, 17 g, Daily PRN  acetaminophen, 650 mg, Q6H PRN   Or  acetaminophen, 650 mg, Q6H PRN         Objective:    BP (!) 111/58   Pulse 88   Temp 97.7 °F (36.5 °C) (Axillary)   Resp 17   Ht 1.702 m (5' 7\")   Wt 76.1 kg (167 lb 12.3 oz)   SpO2 94%   BMI 26.28 kg/m²     General Appearance: alert and oriented to person, place and time and in no acute distress  Skin: warm and dry. LUE mass with associated black rash  Head: normocephalic and atraumatic  Pulmonary/Chest: clear to auscultation bilaterally- no wheezes, rales or rhonchi, normal air movement, no respiratory distress  Cardiovascular: normal rate, normal S1 and S2  Abdomen: soft, non-tender, non-distended, normal bowel

## 2025-03-28 NOTE — PLAN OF CARE
Problem: Discharge Planning  Goal: Discharge to home or other facility with appropriate resources  3/28/2025 1936 by Anusha Hernandez RN  Outcome: Progressing  3/28/2025 0945 by Tisha Urena RN  Outcome: Progressing     Problem: Pain  Goal: Verbalizes/displays adequate comfort level or baseline comfort level  3/28/2025 1936 by Anusha Hernandez RN  Outcome: Progressing  3/28/2025 0945 by Tisha Urena RN  Outcome: Progressing     Problem: Safety - Adult  Goal: Free from fall injury  3/28/2025 1936 by Anusha Hernandez RN  Outcome: Progressing  3/28/2025 0945 by Tisha Urena RN  Outcome: Progressing     Problem: Skin/Tissue Integrity  Goal: Skin integrity remains intact  Description: 1.  Monitor for areas of redness and/or skin breakdown  2.  Assess vascular access sites hourly  3.  Every 4-6 hours minimum:  Change oxygen saturation probe site  4.  Every 4-6 hours:  If on nasal continuous positive airway pressure, respiratory therapy assess nares and determine need for appliance change or resting period  Outcome: Progressing     Problem: ABCDS Injury Assessment  Goal: Absence of physical injury  Outcome: Progressing     Problem: Cardiovascular - Adult  Goal: Maintains optimal cardiac output and hemodynamic stability  Outcome: Progressing  Goal: Absence of cardiac dysrhythmias or at baseline  Outcome: Progressing     Problem: Musculoskeletal - Adult  Goal: Return mobility to safest level of function  Outcome: Progressing     Problem: Metabolic/Fluid and Electrolytes - Adult  Goal: Electrolytes maintained within normal limits  Outcome: Progressing     Problem: Hematologic - Adult  Goal: Maintains hematologic stability  Outcome: Progressing     Problem: Respiratory - Adult  Goal: Achieves optimal ventilation and oxygenation  Outcome: Progressing     Problem: Discharge Planning  Goal: Discharge to home or other facility with appropriate resources  3/28/2025 1936 by Anusha Hernandez

## 2025-03-28 NOTE — CARE COORDINATION
Plan to transition heparin gtt to PO Eliquis. Anticipated to discharge tomorrow, William at [facility] unable to accept d/t no beds. Referral made to admissions at [facility], await to hear back.  Susana BOWENS, RN  Case Management     Addendum: [facility] is able to accept.  Patient needs 3 day stay, facility able to accept 3/29/25 if medically stable. 7000, NAI initiated. Envelope with demos and transport form in chart.   Susana BOWENS, RN  Case Management

## 2025-03-28 NOTE — PROGRESS NOTES
Spiritual Health History and Assessment/Progress Note  [facility]    Palliative [facility],  ,  ,  Patient was asleep when  rounded.  prayed for Patient and left prayer card. No Family Members were present.    Name: Shorty Boswell MRN: [id number]    Age: 89 y.o.     Sex: male   Language: English   Religious: Confucianist   Acute pulmonary embolism without acute cor pulmonale (HCC)     Date: 3/28/2025                           Spiritual Assessment began in [address] MED SURG        Referral/Consult From: Palliative Care   Encounter Overview/Reason: Palliative Care  Service Provided For: Patient (Patient was sleeping.)    Chasity, Belief, Meaning:   Patient unable to assess at this time  Family/Friends No family/friends present      Importance and Influence:  Patient unable to assess at this time  Family/Friends No family/friends present    Community:  Patient Other: N/A  Family/Friends No family/friends present    Assessment and Plan of Care:     Patient Interventions include: Provided sacramental/Sabianism ritual  Family/Friends Interventions include: No family/friends present    Patient Plan of Care: Spiritual Care available upon further referral  Family/Friends Plan of Care: Spiritual Care available upon further referral    Electronically signed by CM Arango on 3/28/2025 at 12:15 PM

## 2025-03-29 VITALS
WEIGHT: 167.77 LBS | TEMPERATURE: 97.9 F | OXYGEN SATURATION: 94 % | HEART RATE: 72 BPM | SYSTOLIC BLOOD PRESSURE: 110 MMHG | DIASTOLIC BLOOD PRESSURE: 64 MMHG | BODY MASS INDEX: 26.33 KG/M2 | RESPIRATION RATE: 18 BRPM | HEIGHT: 67 IN

## 2025-03-29 LAB
ANION GAP SERPL CALCULATED.3IONS-SCNC: 17 MMOL/L (ref 7–16)
BASOPHILS # BLD: 0.03 K/UL (ref 0–0.2)
BASOPHILS NFR BLD: 1 % (ref 0–2)
BUN SERPL-MCNC: 12 MG/DL (ref 6–23)
CALCIUM SERPL-MCNC: 8.4 MG/DL (ref 8.6–10.2)
CHLORIDE SERPL-SCNC: 97 MMOL/L (ref 98–107)
CO2 SERPL-SCNC: 18 MMOL/L (ref 22–29)
CREAT SERPL-MCNC: 0.7 MG/DL (ref 0.7–1.2)
EOSINOPHIL # BLD: 0.16 K/UL (ref 0.05–0.5)
EOSINOPHILS RELATIVE PERCENT: 3 % (ref 0–6)
ERYTHROCYTE [DISTWIDTH] IN BLOOD BY AUTOMATED COUNT: 13.6 % (ref 11.5–15)
GFR, ESTIMATED: 85 ML/MIN/1.73M2
GLUCOSE SERPL-MCNC: 90 MG/DL (ref 74–99)
HCT VFR BLD AUTO: 33.7 % (ref 37–54)
HGB BLD-MCNC: 10.7 G/DL (ref 12.5–16.5)
IMM GRANULOCYTES # BLD AUTO: 0.06 K/UL (ref 0–0.58)
IMM GRANULOCYTES NFR BLD: 1 % (ref 0–5)
LACTATE BLDV-SCNC: 5 MMOL/L (ref 0.5–2.2)
LYMPHOCYTES NFR BLD: 0.77 K/UL (ref 1.5–4)
LYMPHOCYTES RELATIVE PERCENT: 15 % (ref 20–42)
MCH RBC QN AUTO: 30.2 PG (ref 26–35)
MCHC RBC AUTO-ENTMCNC: 31.8 G/DL (ref 32–34.5)
MCV RBC AUTO: 95.2 FL (ref 80–99.9)
MONOCYTES NFR BLD: 0.34 K/UL (ref 0.1–0.95)
MONOCYTES NFR BLD: 7 % (ref 2–12)
NEUTROPHILS NFR BLD: 74 % (ref 43–80)
NEUTS SEG NFR BLD: 3.86 K/UL (ref 1.8–7.3)
PLATELET # BLD AUTO: 278 K/UL (ref 130–450)
PMV BLD AUTO: 9.1 FL (ref 7–12)
POTASSIUM SERPL-SCNC: 4 MMOL/L (ref 3.5–5)
RBC # BLD AUTO: 3.54 M/UL (ref 3.8–5.8)
SODIUM SERPL-SCNC: 132 MMOL/L (ref 132–146)
WBC OTHER # BLD: 5.2 K/UL (ref 4.5–11.5)

## 2025-03-29 PROCEDURE — 85025 COMPLETE CBC W/AUTO DIFF WBC: CPT

## 2025-03-29 PROCEDURE — 36415 COLL VENOUS BLD VENIPUNCTURE: CPT

## 2025-03-29 PROCEDURE — 2500000003 HC RX 250 WO HCPCS

## 2025-03-29 PROCEDURE — 80048 BASIC METABOLIC PNL TOTAL CA: CPT

## 2025-03-29 PROCEDURE — 83605 ASSAY OF LACTIC ACID: CPT

## 2025-03-29 PROCEDURE — 6370000000 HC RX 637 (ALT 250 FOR IP): Performed by: STUDENT IN AN ORGANIZED HEALTH CARE EDUCATION/TRAINING PROGRAM

## 2025-03-29 PROCEDURE — 99239 HOSP IP/OBS DSCHRG MGMT >30: CPT | Performed by: STUDENT IN AN ORGANIZED HEALTH CARE EDUCATION/TRAINING PROGRAM

## 2025-03-29 RX ORDER — LIDOCAINE 50 MG/G
1 PATCH TOPICAL DAILY
Refills: 0 | DISCHARGE
Start: 2025-03-29 | End: 2025-04-12

## 2025-03-29 RX ORDER — METOPROLOL TARTRATE 25 MG/1
12.5 TABLET, FILM COATED ORAL 2 TIMES DAILY
Status: DISCONTINUED | OUTPATIENT
Start: 2025-03-29 | End: 2025-03-29 | Stop reason: HOSPADM

## 2025-03-29 RX ORDER — METOPROLOL TARTRATE 25 MG/1
12.5 TABLET, FILM COATED ORAL 2 TIMES DAILY
DISCHARGE
Start: 2025-03-29

## 2025-03-29 RX ADMIN — APIXABAN 10 MG: 5 TABLET, FILM COATED ORAL at 08:10

## 2025-03-29 RX ADMIN — METOPROLOL TARTRATE 25 MG: 25 TABLET, FILM COATED ORAL at 08:10

## 2025-03-29 RX ADMIN — METOPROLOL TARTRATE 12.5 MG: 25 TABLET, FILM COATED ORAL at 20:35

## 2025-03-29 RX ADMIN — SODIUM CHLORIDE, PRESERVATIVE FREE 10 ML: 5 INJECTION INTRAVENOUS at 20:35

## 2025-03-29 RX ADMIN — APIXABAN 10 MG: 5 TABLET, FILM COATED ORAL at 20:35

## 2025-03-29 NOTE — DISCHARGE SUMMARY
[facility] Hospitalist Physician Discharge Summary       No follow-up provider specified.    Activity level: As tolerated     Dispo: SNF      Condition on discharge: Stable     Patient ID:  Shorty Boswell  [id number]  89 y.o.  9/16/1932    Admit date: 3/26/2025    Discharge date and time:  3/29/2025  10:34 AM    Admission Diagnoses: Principal Problem:    Acute pulmonary embolism without acute cor pulmonale (HCC)  Active Problems:    Lactic acidosis    Fall at home, initial encounter    Atrial fibrillation and flutter (HCC)    Metastatic melanoma (HCC)    Acute deep vein thrombosis (DVT) of femoral vein of right lower extremity (HCC)    Atrial flutter, chronic (HCC)    Primary hypertension    RBBB  Resolved Problems:    * No resolved hospital problems. *      Discharge Diagnoses: Principal Problem:    Acute pulmonary embolism without acute cor pulmonale (HCC)  Active Problems:    Lactic acidosis    Fall at home, initial encounter    Atrial fibrillation and flutter (HCC)    Metastatic melanoma (HCC)    Acute deep vein thrombosis (DVT) of femoral vein of right lower extremity (HCC)    Atrial flutter, chronic (HCC)    Primary hypertension    RBBB  Resolved Problems:    * No resolved hospital problems. *      Consults:  IP CONSULT TO SOCIAL WORK  IP CONSULT TO PALLIATIVE CARE  IP CONSULT TO CARDIOLOGY  IP CONSULT TO VASCULAR ACCESS TEAM    Procedures:     Hospital Course:   Patient Shorty Boswell is a 89 y.o. 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

## 2025-03-29 NOTE — PLAN OF CARE
Problem: Discharge Planning  Goal: Discharge to home or other facility with appropriate resources  3/29/2025 0918 by Lela Amado RN  Outcome: Completed  3/28/2025 1936 by Anusha Hernandez RN  Outcome: Progressing     Problem: Pain  Goal: Verbalizes/displays adequate comfort level or baseline comfort level  3/29/2025 0918 by Lela Amado RN  Outcome: Completed  3/28/2025 1936 by Anusha Hernandez RN  Outcome: Progressing     Problem: Safety - Adult  Goal: Free from fall injury  3/29/2025 0918 by Lela Amado RN  Outcome: Completed  3/28/2025 1936 by Anusha Hernandez RN  Outcome: Progressing     Problem: Skin/Tissue Integrity  Goal: Skin integrity remains intact  Description: 1.  Monitor for areas of redness and/or skin breakdown  2.  Assess vascular access sites hourly  3.  Every 4-6 hours minimum:  Change oxygen saturation probe site  4.  Every 4-6 hours:  If on nasal continuous positive airway pressure, respiratory therapy assess nares and determine need for appliance change or resting period  3/29/2025 0918 by Lela Amado RN  Outcome: Completed  Flowsheets  Taken 3/29/2025 0700 by Lela Amado RN  Skin Integrity Remains Intact: Monitor for areas of redness and/or skin breakdown  Taken 3/28/2025 2127 by Anusha Hernandez RN  Skin Integrity Remains Intact: Monitor for areas of redness and/or skin breakdown  3/28/2025 1936 by Anusha Hernandez RN  Outcome: Progressing     Problem: ABCDS Injury Assessment  Goal: Absence of physical injury  3/29/2025 0918 by Lela Amado RN  Outcome: Completed  3/28/2025 1936 by Anusha Hernandez RN  Outcome: Progressing     Problem: Cardiovascular - Adult  Goal: Maintains optimal cardiac output and hemodynamic stability  3/29/2025 0918 by Lela Amado RN  Outcome: Completed  3/28/2025 1936 by Anusha Hernandez RN  Outcome: Progressing  Goal: Absence of cardiac dysrhythmias or at baseline  3/29/2025 0918 by Lela Amado

## 2025-03-29 NOTE — PROGRESS NOTES
Nurse to nurse called to [facility] at this time.  Med list report faxed over at [phone].  No concerns voiced.  Physician ambulance to pick patient up at 4pm.

## 2025-04-07 ENCOUNTER — TELEPHONE (OUTPATIENT)
Dept: CARDIOLOGY CLINIC | Age: 89
End: 2025-04-07

## 2025-04-07 NOTE — TELEPHONE ENCOUNTER
Nurse from [facility] left a message stating patient and family are declining Lopressor 12.5mg bid. NP at the facility is asking if they can d/c, vitals have been stable, bp 106/66 this am which is average for him and heart rate average is , please advise

## 2025-04-15 ENCOUNTER — TELEPHONE (OUTPATIENT)
Dept: CARDIOLOGY CLINIC | Age: 89
End: 2025-04-15

## 2025-04-15 NOTE — TELEPHONE ENCOUNTER
Left a message for Sosa at [facility] to call back and schedule a HFU with MANE in May.